# Patient Record
Sex: MALE | Race: WHITE | URBAN - METROPOLITAN AREA
[De-identification: names, ages, dates, MRNs, and addresses within clinical notes are randomized per-mention and may not be internally consistent; named-entity substitution may affect disease eponyms.]

---

## 2017-02-20 PROCEDURE — 36415 COLL VENOUS BLD VENIPUNCTURE: CPT | Performed by: INTERNAL MEDICINE

## 2017-02-20 PROCEDURE — 80061 LIPID PANEL: CPT | Performed by: INTERNAL MEDICINE

## 2017-02-20 PROCEDURE — 80076 HEPATIC FUNCTION PANEL: CPT | Performed by: INTERNAL MEDICINE

## 2018-04-22 ENCOUNTER — APPOINTMENT (OUTPATIENT)
Dept: GENERAL RADIOLOGY | Facility: HOSPITAL | Age: 68
End: 2018-04-22
Attending: EMERGENCY MEDICINE
Payer: MEDICARE

## 2018-04-22 ENCOUNTER — HOSPITAL ENCOUNTER (OUTPATIENT)
Age: 68
Discharge: ANOTHER HOSPITAL | End: 2018-04-22
Attending: EMERGENCY MEDICINE
Payer: MEDICARE

## 2018-04-22 ENCOUNTER — HOSPITAL ENCOUNTER (OUTPATIENT)
Facility: HOSPITAL | Age: 68
Setting detail: OBSERVATION
Discharge: HOME | End: 2018-04-24
Attending: EMERGENCY MEDICINE | Admitting: HOSPITALIST
Payer: MEDICARE

## 2018-04-22 ENCOUNTER — APPOINTMENT (OUTPATIENT)
Dept: CT IMAGING | Facility: HOSPITAL | Age: 68
End: 2018-04-22
Attending: EMERGENCY MEDICINE
Payer: MEDICARE

## 2018-04-22 VITALS
OXYGEN SATURATION: 98 % | DIASTOLIC BLOOD PRESSURE: 88 MMHG | HEART RATE: 96 BPM | SYSTOLIC BLOOD PRESSURE: 145 MMHG | RESPIRATION RATE: 20 BRPM

## 2018-04-22 DIAGNOSIS — R07.89 CHEST PAIN, ATYPICAL: Primary | ICD-10-CM

## 2018-04-22 DIAGNOSIS — R55 SYNCOPE, NEAR: Primary | ICD-10-CM

## 2018-04-22 DIAGNOSIS — I48.0 PAROXYSMAL ATRIAL FIBRILLATION (HCC): ICD-10-CM

## 2018-04-22 DIAGNOSIS — R07.9 ACUTE CHEST PAIN: ICD-10-CM

## 2018-04-22 PROCEDURE — 93010 ELECTROCARDIOGRAM REPORT: CPT | Performed by: EMERGENCY MEDICINE

## 2018-04-22 PROCEDURE — 99214 OFFICE O/P EST MOD 30 MIN: CPT

## 2018-04-22 PROCEDURE — 71260 CT THORAX DX C+: CPT | Performed by: EMERGENCY MEDICINE

## 2018-04-22 PROCEDURE — 71046 X-RAY EXAM CHEST 2 VIEWS: CPT | Performed by: EMERGENCY MEDICINE

## 2018-04-22 PROCEDURE — 99220 INITIAL OBSERVATION CARE,LEVL III: CPT | Performed by: HOSPITALIST

## 2018-04-22 PROCEDURE — 93005 ELECTROCARDIOGRAM TRACING: CPT

## 2018-04-22 RX ORDER — ASPIRIN 81 MG/1
324 TABLET, CHEWABLE ORAL ONCE
Status: COMPLETED | OUTPATIENT
Start: 2018-04-22 | End: 2018-04-22

## 2018-04-22 RX ORDER — WARFARIN SODIUM 5 MG/1
5 TABLET ORAL DAILY
Status: DISCONTINUED | OUTPATIENT
Start: 2018-04-23 | End: 2018-04-23

## 2018-04-22 RX ORDER — ACETAMINOPHEN 325 MG/1
650 TABLET ORAL EVERY 6 HOURS PRN
Status: DISCONTINUED | OUTPATIENT
Start: 2018-04-22 | End: 2018-04-24

## 2018-04-22 RX ORDER — ONDANSETRON 2 MG/ML
4 INJECTION INTRAMUSCULAR; INTRAVENOUS EVERY 6 HOURS PRN
Status: DISCONTINUED | OUTPATIENT
Start: 2018-04-22 | End: 2018-04-24

## 2018-04-22 RX ORDER — ATORVASTATIN CALCIUM 20 MG/1
20 TABLET, FILM COATED ORAL DAILY
Status: DISCONTINUED | OUTPATIENT
Start: 2018-04-23 | End: 2018-04-24

## 2018-04-22 RX ORDER — PANTOPRAZOLE SODIUM 40 MG/1
40 TABLET, DELAYED RELEASE ORAL
Status: DISCONTINUED | OUTPATIENT
Start: 2018-04-23 | End: 2018-04-24

## 2018-04-22 RX ORDER — ENOXAPARIN SODIUM 100 MG/ML
1 INJECTION SUBCUTANEOUS EVERY 12 HOURS SCHEDULED
Status: DISCONTINUED | OUTPATIENT
Start: 2018-04-23 | End: 2018-04-24

## 2018-04-22 RX ORDER — ASPIRIN 81 MG/1
81 TABLET ORAL DAILY
Status: DISCONTINUED | OUTPATIENT
Start: 2018-04-23 | End: 2018-04-24

## 2018-04-22 NOTE — ED NOTES
Patient states he has not been taking his medications for several months, but today he decided to take his BP medications.

## 2018-04-22 NOTE — ED INITIAL ASSESSMENT (HPI)
Patient presents to triage with complaints sudden onset of dizziness associated with mild nausea approx 5 hours pta. Patient denies visual changes/blurred vision of chest pain. States initially had a mild HA as well which has now resolved.

## 2018-04-22 NOTE — ED PROVIDER NOTES
Patient Seen in: [facility] CLIN[facility] Immediate Care In [place]    History   Patient presents with:  Dizziness (neurologic)    Stated Complaint: light headead    HPI    70 yo male was in his car earlier when he became lightheaded and felt like he might pas air)    Current:/88   Pulse 96   Resp 20   SpO2 98%         Physical Exam   Constitutional: He is oriented to person, place, and time. He appears well-developed and well-nourished. No distress. HENT:   Head: Normocephalic and atraumatic.    Mouth/Th

## 2018-04-22 NOTE — ED INITIAL ASSESSMENT (HPI)
Felt lightheaded earlier today. Not eating and drinking as usual r/t being too busy. Denies c/p.   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

## 2018-04-23 ENCOUNTER — APPOINTMENT (OUTPATIENT)
Dept: ULTRASOUND IMAGING | Facility: HOSPITAL | Age: 68
End: 2018-04-23
Attending: HOSPITALIST
Payer: MEDICARE

## 2018-04-23 ENCOUNTER — APPOINTMENT (OUTPATIENT)
Dept: CV DIAGNOSTICS | Facility: HOSPITAL | Age: 68
End: 2018-04-23
Attending: HOSPITALIST
Payer: MEDICARE

## 2018-04-23 PROCEDURE — 93880 EXTRACRANIAL BILAT STUDY: CPT | Performed by: HOSPITALIST

## 2018-04-23 PROCEDURE — 99226 SUBSEQUENT OBSERVATION CARE: CPT | Performed by: HOSPITALIST

## 2018-04-23 PROCEDURE — 93017 CV STRESS TEST TRACING ONLY: CPT | Performed by: HOSPITALIST

## 2018-04-23 PROCEDURE — 96372 THER/PROPH/DIAG INJ SC/IM: CPT

## 2018-04-23 PROCEDURE — 93350 STRESS TTE ONLY: CPT | Performed by: HOSPITALIST

## 2018-04-23 RX ORDER — ADENOSINE 3 MG/ML
INJECTION, SOLUTION INTRAVENOUS
Status: DISPENSED
Start: 2018-04-23 | End: 2018-04-24

## 2018-04-23 RX ORDER — WARFARIN SODIUM 5 MG/1
5 TABLET ORAL NIGHTLY
Status: DISCONTINUED | OUTPATIENT
Start: 2018-04-23 | End: 2018-04-24

## 2018-04-23 NOTE — ED PROVIDER NOTES
Patient Seen in: [facility] Emergency Department    History   Patient presents with:  Dizziness (neurologic): sudden onset associated mild nausea    Stated Complaint: lightheadedness left chest pain    HPI    68-year-old male with history of atria Years: 15.00        Types: Cigarettes     Quit date: 4/14/1980  Alcohol use: No               Comment: None since 1980      Review of Systems   Constitutional: Negative for appetite change, fatigue and fever.    HENT: Negative for congestion, ear pain and s Abdominal: Soft. He exhibits no distension. There is no tenderness. There is no guarding. Musculoskeletal: Normal range of motion. He exhibits no tenderness. Neurological: He is alert and oriented to person, place, and time.    5/5 strength in b/l UEs - 99 mg/dL   Sodium 133 (L) 136 - 144 mmol/L   Potassium 4.2 3.3 - 5.1 mmol/L   Chloride 103 95 - 110 mmol/L   CO2 21 (L) 22 - 32 mmol/L   BUN 14 8 - 20 mg/dL   Creatinine 0.80 0.50 - 1.50 mg/dL   Calcium, Total 9.1 8.5 - 10.5 mg/dL   BUN/CREA Ratio 17.5 1 (PULMONARY ANGIOGRAM)      IMPRESSION:    Adequate technical quality study. No evidence for pulmonary embolism. No thoracic aortic dissection. Status post mitral valve replacement. Left atrial enlargement. No pericardial effusion.     Trace right ple MEDICAL DECISION MAKING:  After obtaining the patient's history, performing the physical exam and reviewing the diagnostics, multiple initial diagnoses were considered based on the presenting problem including ACS, anxiety, chest pain, chest wall pain .

## 2018-04-23 NOTE — CONSULTS
[facility]    PATIENT'S NAME: Trey Dobbs   ATTENDING PHYSICIAN: Daja Gipson MD   CONSULTING PHYSICIAN: Nilo Lopez MD   PATIENT ACCOUNT#:   [id number]    LOCATION:  5W [address] [facility] #:   [id number]       DATE OF hematuria. There is no known history of stroke. There are no new allergies. No history of diabetes. There is no easy bruising. There is no limiting arthritis. PHYSICAL EXAMINATION:    GENERAL:  He is a male, currently appears comfortable.   VITAL aspirin 81 mg daily. 6. EKGs were reviewed. 7.   We will discuss with Dr. Rocío Osuna.    Dictated By [id number] [place] [address].  Corrie Weaver MD  d: 04/23/2018 13:49:56  t: 04/23/2018 14:12:20  Job [id number]  JLW/

## 2018-04-23 NOTE — IMAGING NOTE
16:10pm - Patient here for inpatient stress echo. Per echo tech, resting images demonstrate wall motion abnormality and decreased EF. Asymptomatic. I called Dr. Siomara Medrano called and notified of above. Orders received to proceed with test as ordered.   Laura Major

## 2018-04-23 NOTE — H&P
[facility] Patient Status:  Emergency    1950 MRN [id number]   Location [address] Attending Jericho Perez MD   [facility] Day # 0 PCP Jimy Villatoro MD     Date:   [Other] Paxton Peraza Mother 64      age 64   • Dementia Father    • Prostate Cancer Father    • Diabetes Father    • Breast Cancer Mother    • Hypertension Brother       reports that he quit smoking about 38 years ago. His smoking use included Cigarettes.  H problem:  None. Eye:  Pupils are equal, round and reactive to light, extraocular movements are intact, Normal conjunctiva. HENT:  Normocephalic, oral mucosa is moist.  Head:  Normocephalic, atraumatic.   Neck:  Supple, non-tender, no carotid bruit, no jug PM

## 2018-04-24 VITALS
HEIGHT: 71 IN | HEART RATE: 80 BPM | OXYGEN SATURATION: 97 % | RESPIRATION RATE: 20 BRPM | BODY MASS INDEX: 23.2 KG/M2 | SYSTOLIC BLOOD PRESSURE: 128 MMHG | DIASTOLIC BLOOD PRESSURE: 96 MMHG | TEMPERATURE: 98 F | WEIGHT: 165.69 LBS

## 2018-04-24 PROCEDURE — 96372 THER/PROPH/DIAG INJ SC/IM: CPT

## 2018-04-24 PROCEDURE — 99217 OBSERVATION CARE DISCHARGE: CPT | Performed by: HOSPITALIST

## 2018-04-24 RX ORDER — WARFARIN SODIUM 5 MG/1
5 TABLET ORAL NIGHTLY
Qty: 30 TABLET | Refills: 0 | Status: SHIPPED | OUTPATIENT
Start: 2018-04-24 | End: 2018-05-04

## 2018-04-24 NOTE — PROGRESS NOTES
Assessment and Plan:     1. CAD  - previous CABG  - normal stress Echo  2. SVT  - at end of stress Echo  3.  PAF  - warfarin      PLAN:  - home  - follow-up with Dr. Thompson Adjutant and will arrange for Event monitor      Subjective:     No c/o    Objective: No significant ECA stenosis. Antegrade vertebral flow bilaterally.  3. Measurements based on NASCET criteria     Dictated by (CST): Jerrica Cary MD on 4/23/2018 at 17:32     Approved by (CST): Jerrica Cary MD on 4/23/2018 at 17:37          Ct Chest

## 2018-04-25 ENCOUNTER — PATIENT OUTREACH (OUTPATIENT)
Dept: CASE MANAGEMENT | Age: 68
End: 2018-04-25

## 2018-04-25 ENCOUNTER — TELEPHONE (OUTPATIENT)
Dept: INTERNAL MEDICINE UNIT | Facility: HOSPITAL | Age: 68
End: 2018-04-25

## 2018-04-25 DIAGNOSIS — I48.20 CHRONIC ATRIAL FIBRILLATION (HCC): Primary | ICD-10-CM

## 2018-04-25 NOTE — TELEPHONE ENCOUNTER
HOSPITALIST NURSE      TELEPHONE NOTE    Pt called to review Coumadin instructions and assist w scheduling f/u apt w the [address] [place]. Per dc instructions pt to f/u on Friday 4/27/18.      No answer VM: requesting call back to RN or Coumadin c

## 2018-04-25 NOTE — PROGRESS NOTES
TCM OUTREACH    Call made to attempt to reach patient for TCM follow up. No answer, Voicemail left requesting call back at [phone].     Book by date: 5/9/18    (Triage Team if pt returns call please transfer to ext [phone])

## 2018-04-26 NOTE — PROGRESS NOTES
Initial Post Discharge Follow Up   Discharge Date: 4/24/18  Contact Date: 4/25/2018    Consent Verification:  Assessment Completed With: Patient  HIPAA Verified?   Yes    Discharge Dx:   Atypical chest pain    General:   • How have you been since your dis normal daily activities of living as you have prior to your hospital stay? yes  • Were you given a specific diet to follow at discharge?   no, but I am eating more fruits and vegetables.       Medications:     Current Outpatient Prescriptions:  Warfarin Sod see Dr Lurdes Gregory first then call to schedule    Have you made all of your follow up appointments? no - Transferred to cardiology office to schedule monitor placement   Is there any reason as to why you cannot make your appointments?    No  NCM Reviewed upco

## 2018-04-27 ENCOUNTER — ANTI-COAG VISIT (OUTPATIENT)
Dept: INTERNAL MEDICINE CLINIC | Facility: CLINIC | Age: 68
End: 2018-04-27

## 2018-04-27 DIAGNOSIS — I25.119 CORONARY ARTERY DISEASE INVOLVING NATIVE CORONARY ARTERY OF NATIVE HEART WITH ANGINA PECTORIS (HCC): ICD-10-CM

## 2018-04-27 DIAGNOSIS — I34.0 NON-RHEUMATIC MITRAL REGURGITATION: ICD-10-CM

## 2018-04-27 DIAGNOSIS — I48.91 ATRIAL FIBRILLATION, UNSPECIFIED TYPE (HCC): ICD-10-CM

## 2018-04-27 DIAGNOSIS — I48.20 CHRONIC ATRIAL FIBRILLATION (HCC): ICD-10-CM

## 2018-04-27 PROCEDURE — G0463 HOSPITAL OUTPT CLINIC VISIT: HCPCS

## 2018-04-27 PROCEDURE — 36416 COLLJ CAPILLARY BLOOD SPEC: CPT

## 2018-04-27 PROCEDURE — 85610 PROTHROMBIN TIME: CPT

## 2018-05-04 ENCOUNTER — ANTI-COAG VISIT (OUTPATIENT)
Dept: INTERNAL MEDICINE CLINIC | Facility: CLINIC | Age: 68
End: 2018-05-04

## 2018-05-04 DIAGNOSIS — I25.119 CORONARY ARTERY DISEASE INVOLVING NATIVE CORONARY ARTERY OF NATIVE HEART WITH ANGINA PECTORIS (HCC): ICD-10-CM

## 2018-05-04 DIAGNOSIS — I34.0 NON-RHEUMATIC MITRAL REGURGITATION: ICD-10-CM

## 2018-05-04 DIAGNOSIS — I48.20 CHRONIC ATRIAL FIBRILLATION (HCC): ICD-10-CM

## 2018-05-04 PROBLEM — Z79.01 LONG TERM (CURRENT) USE OF ANTICOAGULANTS: Status: ACTIVE | Noted: 2018-05-04

## 2018-05-04 PROBLEM — I47.10 SVT (SUPRAVENTRICULAR TACHYCARDIA): Status: ACTIVE | Noted: 2018-05-04

## 2018-05-04 PROBLEM — I47.1 SVT (SUPRAVENTRICULAR TACHYCARDIA) (HCC): Status: ACTIVE | Noted: 2018-05-04

## 2018-05-04 PROBLEM — I47.1 SVT (SUPRAVENTRICULAR TACHYCARDIA): Status: ACTIVE | Noted: 2018-05-04

## 2018-05-04 PROBLEM — I47.10 SVT (SUPRAVENTRICULAR TACHYCARDIA) (HCC): Status: ACTIVE | Noted: 2018-05-04

## 2018-05-04 PROCEDURE — 36416 COLLJ CAPILLARY BLOOD SPEC: CPT

## 2018-05-04 PROCEDURE — 85610 PROTHROMBIN TIME: CPT

## 2018-05-21 ENCOUNTER — ANTI-COAG VISIT (OUTPATIENT)
Dept: INTERNAL MEDICINE CLINIC | Facility: CLINIC | Age: 68
End: 2018-05-21

## 2018-05-21 DIAGNOSIS — I25.119 CORONARY ARTERY DISEASE INVOLVING NATIVE CORONARY ARTERY OF NATIVE HEART WITH ANGINA PECTORIS (HCC): ICD-10-CM

## 2018-05-21 DIAGNOSIS — I34.0 NON-RHEUMATIC MITRAL REGURGITATION: ICD-10-CM

## 2018-05-21 DIAGNOSIS — I48.20 CHRONIC ATRIAL FIBRILLATION (HCC): ICD-10-CM

## 2018-05-21 PROCEDURE — 85610 PROTHROMBIN TIME: CPT

## 2018-05-21 PROCEDURE — 36416 COLLJ CAPILLARY BLOOD SPEC: CPT

## 2018-05-24 ENCOUNTER — NURSE TRIAGE (OUTPATIENT)
Dept: INTERNAL MEDICINE CLINIC | Facility: CLINIC | Age: 68
End: 2018-05-24

## 2018-05-24 NOTE — TELEPHONE ENCOUNTER
Action Requested: Summary for Provider     []  Critical Lab, Recommendations Needed  [] Need Additional Advice  []   FYI    []   Need Orders  [] Need Medications Sent to Pharmacy  []  Other     SUMMARY: APPT CREATED, foot pain    Pt states for approx 2 wee

## 2018-05-25 ENCOUNTER — OFFICE VISIT (OUTPATIENT)
Dept: INTERNAL MEDICINE CLINIC | Facility: CLINIC | Age: 68
End: 2018-05-25

## 2018-05-25 VITALS
DIASTOLIC BLOOD PRESSURE: 64 MMHG | HEIGHT: 71 IN | SYSTOLIC BLOOD PRESSURE: 136 MMHG | BODY MASS INDEX: 23.1 KG/M2 | HEART RATE: 79 BPM | WEIGHT: 165 LBS

## 2018-05-25 DIAGNOSIS — M79.671 BILATERAL FOOT PAIN: Primary | ICD-10-CM

## 2018-05-25 DIAGNOSIS — M79.672 BILATERAL FOOT PAIN: Primary | ICD-10-CM

## 2018-05-25 PROCEDURE — G0463 HOSPITAL OUTPT CLINIC VISIT: HCPCS | Performed by: INTERNAL MEDICINE

## 2018-05-25 PROCEDURE — 99213 OFFICE O/P EST LOW 20 MIN: CPT | Performed by: INTERNAL MEDICINE

## 2018-05-25 NOTE — PATIENT INSTRUCTIONS
Please rest and apply ice to both feet 2-3 times daily, and take Tylenol when needed for pain relief. Avoid all anti-inflammatory medications because of warfarin. Schedule an appointment with Dr. Courtney Gay.

## 2018-05-25 NOTE — PROGRESS NOTES
Major Tameka is a 69year old male. Patient presents with: Foot Pain  Wrist Pain    HPI:   About 2 weeks ago, he developed pain first in his right shoulder and then his right elbow. Shoulder and elbow pain have since resolved.   More recently he has calcified nodules right                shoulder  2000: OTHER      Comment: Nasal fracture   Social History:  Smoking status: Former Smoker                                                              Packs/day: 1.50      Years: 15.00        Types: Cigarett

## 2018-05-31 ENCOUNTER — ANTI-COAG VISIT (OUTPATIENT)
Dept: INTERNAL MEDICINE CLINIC | Facility: CLINIC | Age: 68
End: 2018-05-31

## 2018-05-31 DIAGNOSIS — I48.20 CHRONIC ATRIAL FIBRILLATION (HCC): ICD-10-CM

## 2018-05-31 DIAGNOSIS — I25.119 CORONARY ARTERY DISEASE INVOLVING NATIVE CORONARY ARTERY OF NATIVE HEART WITH ANGINA PECTORIS (HCC): ICD-10-CM

## 2018-05-31 DIAGNOSIS — I34.0 NON-RHEUMATIC MITRAL REGURGITATION: ICD-10-CM

## 2018-05-31 PROCEDURE — 36416 COLLJ CAPILLARY BLOOD SPEC: CPT

## 2018-05-31 PROCEDURE — 85610 PROTHROMBIN TIME: CPT

## 2018-05-31 PROCEDURE — G0463 HOSPITAL OUTPT CLINIC VISIT: HCPCS

## 2018-06-12 NOTE — DISCHARGE SUMMARY
[facility] HOSPITALIST  DISCHARGE SUMMARY     Caitlyn Angel Patient Status:  Observation    1950 MRN [id number]   Location [facility] Attending No att. providers found   Hosp Day # 0 PCP Gregory Torres MD     Date of Admission: 2018 end of stress ECHO  -tele  -Subtherapeutic INR, might need to adjust Coumadin     Prophylaxis  Subcutaneous Lovenox     CODE STATUS  Full     Primary care physician  Yakov Cope MD     Disposition: home, follow-up with Dr. Kiara Romeo and will arrange for

## 2018-12-14 ENCOUNTER — LAB ENCOUNTER (OUTPATIENT)
Dept: LAB | Age: 68
End: 2018-12-14
Attending: INTERNAL MEDICINE
Payer: MEDICARE

## 2018-12-14 ENCOUNTER — TELEPHONE (OUTPATIENT)
Dept: INTERNAL MEDICINE CLINIC | Facility: CLINIC | Age: 68
End: 2018-12-14

## 2018-12-14 ENCOUNTER — NURSE TRIAGE (OUTPATIENT)
Dept: OTHER | Age: 68
End: 2018-12-14

## 2018-12-14 ENCOUNTER — OFFICE VISIT (OUTPATIENT)
Dept: INTERNAL MEDICINE CLINIC | Facility: CLINIC | Age: 68
End: 2018-12-14
Payer: MEDICARE

## 2018-12-14 VITALS
HEIGHT: 71 IN | TEMPERATURE: 98 F | BODY MASS INDEX: 23.8 KG/M2 | WEIGHT: 170 LBS | DIASTOLIC BLOOD PRESSURE: 75 MMHG | SYSTOLIC BLOOD PRESSURE: 136 MMHG

## 2018-12-14 DIAGNOSIS — R79.1 SUPRATHERAPEUTIC INR: ICD-10-CM

## 2018-12-14 DIAGNOSIS — I34.0 NON-RHEUMATIC MITRAL REGURGITATION: ICD-10-CM

## 2018-12-14 DIAGNOSIS — R31.9 HEMATURIA, UNDIAGNOSED CAUSE: ICD-10-CM

## 2018-12-14 DIAGNOSIS — I25.119 CORONARY ARTERY DISEASE INVOLVING NATIVE CORONARY ARTERY OF NATIVE HEART WITH ANGINA PECTORIS (HCC): ICD-10-CM

## 2018-12-14 DIAGNOSIS — I48.20 CHRONIC ATRIAL FIBRILLATION (HCC): ICD-10-CM

## 2018-12-14 DIAGNOSIS — R31.9 HEMATURIA, UNDIAGNOSED CAUSE: Primary | ICD-10-CM

## 2018-12-14 PROCEDURE — 85610 PROTHROMBIN TIME: CPT

## 2018-12-14 PROCEDURE — 36415 COLL VENOUS BLD VENIPUNCTURE: CPT

## 2018-12-14 PROCEDURE — 99212 OFFICE O/P EST SF 10 MIN: CPT | Performed by: INTERNAL MEDICINE

## 2018-12-14 PROCEDURE — G0463 HOSPITAL OUTPT CLINIC VISIT: HCPCS | Performed by: INTERNAL MEDICINE

## 2018-12-14 PROCEDURE — 81003 URINALYSIS AUTO W/O SCOPE: CPT | Performed by: INTERNAL MEDICINE

## 2018-12-14 NOTE — PATIENT INSTRUCTIONS
Blood in the Urine    Blood in the urine (hematuria) has many possible causes. If it occurs after an injury (such as a car accident or fall), it is most often a sign of bruising to the kidney or bladder.  Common causes of blood in the urine include urinar · Fever of 100.4ºF (38ºC) or higher, or as directed by your healthcare provider  · Repeated vomiting  · Bleeding from the nose or gums or easy bruising  Date Last Reviewed: 9/1/2016  © 4967-5814 [employer].  [address] [place], [place]

## 2018-12-14 NOTE — TELEPHONE ENCOUNTER
Action Requested: Summary for Provider     []  Critical Lab, Recommendations Needed  [] Need Additional Advice  []   FYI    []   Need Orders  [] Need Medications Sent to Pharmacy  []  Other     SUMMARY: Appointment made with Dr Rebecca Lundborg today 12/14/18 at 3:00

## 2018-12-14 NOTE — PROGRESS NOTES
HPI:    Patient ID: Víctor Ospina is a 77year old male.   Hematuria (Pt just recovered from flu, noticed some blood in urine few days ago, today noticed alot of blood in urine, little back pain right side, frequency)      HPI   70y old gentleman with A reports that he quit smoking about 38 years ago. His smoking use included cigarettes. He has a 22.50 pack-year smoking history. he has never used smokeless tobacco. He reports that he does not drink alcohol or use drugs.       Current Outpatient Medication Gastrointestinal: Positive for constipation and blood in stool (minor, 1x). Genitourinary: Positive for urgency, frequency and hematuria. Negative for dysuria and flank pain. Neurological: Negative for dizziness and light-headedness.    Hematological: D Return in about 2 weeks (around 12/28/2018), or if symptoms worsen or fail to improve, for Re-evaluation; see PCP if possible. After visit summary and education provided to patient. All questions answered.  Patient understands and agrees to follow dire · If your urine does not appear bloody (pink, brown or red) then you do not need to restrict your activity in any way.   · If you can see blood in your urine, rest and avoid heavy exertion until your next exam. Do not use aspirin, blood thinners, or anti-pl

## 2018-12-15 NOTE — PROGRESS NOTES
Lab noted. INR 6.0 with non-life threatening bleeding on clinical exam (and INR not >10), no need for oral vitamin K antagonist at this time. Spoke with patient directly via phone. Told him to not take coumadin Friday(today), Saturday, Sunday.  He currently

## 2018-12-15 NOTE — TELEPHONE ENCOUNTER
Please call pt Saturday morning. INR 6.0. Pt held Coumadin Friday. Will need to hold Coumadin again tomorrow and Sunday. Recheck INR Monday and contact Coumadin clinic for instructions.

## 2018-12-15 NOTE — PROGRESS NOTES
Lab noted. INR 6.0 with non-life threatening bleeding on clinical exam, no need for oral vitamin K antagonist as INR not >10.   Will directly call and ask him not to take coumadin Friday(today), Saturday, Sunday and will have him recheck on Monday AM. Pt ne

## 2018-12-17 ENCOUNTER — ANTI-COAG VISIT (OUTPATIENT)
Dept: INTERNAL MEDICINE CLINIC | Facility: CLINIC | Age: 68
End: 2018-12-17
Payer: MEDICARE

## 2018-12-17 DIAGNOSIS — Z51.81 ENCOUNTER FOR THERAPEUTIC DRUG MONITORING: ICD-10-CM

## 2018-12-17 DIAGNOSIS — I48.20 CHRONIC ATRIAL FIBRILLATION (HCC): ICD-10-CM

## 2018-12-17 DIAGNOSIS — I25.119 CORONARY ARTERY DISEASE INVOLVING NATIVE CORONARY ARTERY OF NATIVE HEART WITH ANGINA PECTORIS (HCC): ICD-10-CM

## 2018-12-17 DIAGNOSIS — I34.0 NON-RHEUMATIC MITRAL REGURGITATION: ICD-10-CM

## 2018-12-17 DIAGNOSIS — Z79.01 LONG TERM (CURRENT) USE OF ANTICOAGULANTS: Primary | ICD-10-CM

## 2018-12-17 PROCEDURE — 36416 COLLJ CAPILLARY BLOOD SPEC: CPT

## 2018-12-17 PROCEDURE — 85610 PROTHROMBIN TIME: CPT

## 2018-12-28 ENCOUNTER — ANTI-COAG VISIT (OUTPATIENT)
Dept: INTERNAL MEDICINE CLINIC | Facility: CLINIC | Age: 68
End: 2018-12-28
Payer: MEDICARE

## 2018-12-28 DIAGNOSIS — I25.119 CORONARY ARTERY DISEASE INVOLVING NATIVE CORONARY ARTERY OF NATIVE HEART WITH ANGINA PECTORIS (HCC): ICD-10-CM

## 2018-12-28 DIAGNOSIS — I34.0 NON-RHEUMATIC MITRAL REGURGITATION: ICD-10-CM

## 2018-12-28 DIAGNOSIS — Z51.81 ENCOUNTER FOR THERAPEUTIC DRUG MONITORING: ICD-10-CM

## 2018-12-28 DIAGNOSIS — Z79.01 LONG TERM (CURRENT) USE OF ANTICOAGULANTS: ICD-10-CM

## 2018-12-28 DIAGNOSIS — I48.20 CHRONIC ATRIAL FIBRILLATION (HCC): ICD-10-CM

## 2018-12-28 PROCEDURE — 36416 COLLJ CAPILLARY BLOOD SPEC: CPT

## 2018-12-28 PROCEDURE — 85610 PROTHROMBIN TIME: CPT

## 2019-05-22 ENCOUNTER — TELEPHONE (OUTPATIENT)
Dept: INTERNAL MEDICINE CLINIC | Facility: CLINIC | Age: 69
End: 2019-05-22

## 2019-05-24 ENCOUNTER — ANTI-COAG VISIT (OUTPATIENT)
Dept: INTERNAL MEDICINE CLINIC | Facility: CLINIC | Age: 69
End: 2019-05-24
Payer: MEDICARE

## 2019-05-24 DIAGNOSIS — Z51.81 ENCOUNTER FOR THERAPEUTIC DRUG MONITORING: ICD-10-CM

## 2019-05-24 DIAGNOSIS — I25.119 CORONARY ARTERY DISEASE INVOLVING NATIVE CORONARY ARTERY OF NATIVE HEART WITH ANGINA PECTORIS (HCC): ICD-10-CM

## 2019-05-24 DIAGNOSIS — I48.20 CHRONIC ATRIAL FIBRILLATION (HCC): ICD-10-CM

## 2019-05-24 DIAGNOSIS — Z79.01 LONG TERM (CURRENT) USE OF ANTICOAGULANTS: ICD-10-CM

## 2019-05-24 DIAGNOSIS — I34.0 NON-RHEUMATIC MITRAL REGURGITATION: ICD-10-CM

## 2019-05-24 PROBLEM — Z95.1 S/P CABG (CORONARY ARTERY BYPASS GRAFT): Status: ACTIVE | Noted: 2019-05-24

## 2019-05-24 PROCEDURE — 36416 COLLJ CAPILLARY BLOOD SPEC: CPT

## 2019-05-24 PROCEDURE — 85610 PROTHROMBIN TIME: CPT

## 2019-06-19 ENCOUNTER — HOSPITAL ENCOUNTER (OUTPATIENT)
Age: 69
Discharge: HOME | End: 2019-06-19
Attending: EMERGENCY MEDICINE
Payer: MEDICARE

## 2019-06-19 VITALS
DIASTOLIC BLOOD PRESSURE: 77 MMHG | OXYGEN SATURATION: 96 % | HEART RATE: 99 BPM | RESPIRATION RATE: 18 BRPM | SYSTOLIC BLOOD PRESSURE: 151 MMHG | TEMPERATURE: 98 F | WEIGHT: 175 LBS | BODY MASS INDEX: 24.5 KG/M2 | HEIGHT: 71 IN

## 2019-06-19 DIAGNOSIS — Z79.01 WARFARIN ANTICOAGULATION: ICD-10-CM

## 2019-06-19 DIAGNOSIS — R31.9 HEMATURIA, UNSPECIFIED TYPE: Primary | ICD-10-CM

## 2019-06-19 PROCEDURE — 36415 COLL VENOUS BLD VENIPUNCTURE: CPT

## 2019-06-19 PROCEDURE — 85610 PROTHROMBIN TIME: CPT | Performed by: EMERGENCY MEDICINE

## 2019-06-19 PROCEDURE — 81002 URINALYSIS NONAUTO W/O SCOPE: CPT

## 2019-06-19 PROCEDURE — 99213 OFFICE O/P EST LOW 20 MIN: CPT

## 2019-06-19 NOTE — ED PROVIDER NOTES
Patient Seen in: [facility] Immediate Care In [place] [facility]    History   Patient presents with:  Urinary Symptoms (urologic)    Stated Complaint: BLOOD IN URINE    HPI    78 yo male on coumadin after valve replacement.  Today he noticed blood in his uri Resp 18   Temp 98 °F (36.7 °C)   Temp src Oral   SpO2 96 %   O2 Device None (Room air)       Current:/77   Pulse 99   Temp 98 °F (36.7 °C) (Oral)   Resp 18   Ht 180.3 cm (5' 11\")   Wt 79.4 kg   SpO2 96%   BMI 24.41 kg/m²         Physical Exam   Co List as of 6/19/2019  4:46 PM

## 2019-10-29 ENCOUNTER — TELEPHONE (OUTPATIENT)
Dept: INTERNAL MEDICINE CLINIC | Facility: CLINIC | Age: 69
End: 2019-10-29

## 2019-10-29 DIAGNOSIS — Z79.01 LONG TERM (CURRENT) USE OF ANTICOAGULANTS: ICD-10-CM

## 2019-10-29 DIAGNOSIS — I48.21 PERMANENT ATRIAL FIBRILLATION (HCC): Primary | ICD-10-CM

## 2019-10-29 DIAGNOSIS — Z51.81 ENCOUNTER FOR THERAPEUTIC DRUG MONITORING: ICD-10-CM

## 2019-10-31 NOTE — TELEPHONE ENCOUNTER
Patient is returning the call. Pt would like to schedule an appointment for tomorrow around 1:00 pm or Monday at 1:00 pm. Pt can be reached at [phone].

## 2019-11-01 PROBLEM — I48.21 PERMANENT ATRIAL FIBRILLATION (HCC): Status: ACTIVE | Noted: 2019-11-01

## 2019-11-01 PROBLEM — Z51.81 ENCOUNTER FOR THERAPEUTIC DRUG MONITORING: Status: ACTIVE | Noted: 2019-11-01

## 2019-11-02 ENCOUNTER — TELEPHONE (OUTPATIENT)
Dept: INTERNAL MEDICINE CLINIC | Facility: CLINIC | Age: 69
End: 2019-11-02

## 2019-11-02 NOTE — TELEPHONE ENCOUNTER
PATIENT WOULD LIKE A NURSE TO GIVE HIM A CALL BACK ABOUT A APPOINTMENT HE HAD SCHEDULE ON Friday THAT HE DIDN'T APPROVE OF

## 2019-11-04 NOTE — TELEPHONE ENCOUNTER
LMTCB if he needs to reschedule today's appointment at 1pm. Noted in message earlier conversation from 10/31 about scheduling for either Friday or Monday. Confirmed today's 1pm appointment.

## 2019-11-21 ENCOUNTER — TELEPHONE (OUTPATIENT)
Dept: INTERNAL MEDICINE CLINIC | Facility: CLINIC | Age: 69
End: 2019-11-21

## 2019-11-21 NOTE — TELEPHONE ENCOUNTER
Per Carlyle Wen she is returning Tammy's call. There is no communication. Please advise. She states the answer is \" No\".

## 2019-11-26 ENCOUNTER — TELEPHONE (OUTPATIENT)
Dept: INTERNAL MEDICINE CLINIC | Facility: CLINIC | Age: 69
End: 2019-11-26

## 2019-11-26 NOTE — TELEPHONE ENCOUNTER
S/w  Karla Ramirez, states he had a hard time getting thru to us and is traveling for Thanksgiving. Scheduled to come in 12/4.

## 2019-12-04 ENCOUNTER — ANTI-COAG VISIT (OUTPATIENT)
Dept: INTERNAL MEDICINE CLINIC | Facility: CLINIC | Age: 69
End: 2019-12-04
Payer: MEDICARE

## 2019-12-04 DIAGNOSIS — I25.10 CAD IN NATIVE ARTERY: ICD-10-CM

## 2019-12-04 DIAGNOSIS — I48.21 PERMANENT ATRIAL FIBRILLATION (HCC): ICD-10-CM

## 2019-12-04 DIAGNOSIS — I48.20 CHRONIC ATRIAL FIBRILLATION (HCC): ICD-10-CM

## 2019-12-04 DIAGNOSIS — Z51.81 ENCOUNTER FOR THERAPEUTIC DRUG MONITORING: ICD-10-CM

## 2019-12-04 DIAGNOSIS — I34.0 NON-RHEUMATIC MITRAL REGURGITATION: ICD-10-CM

## 2019-12-04 DIAGNOSIS — Z79.01 LONG TERM (CURRENT) USE OF ANTICOAGULANTS: ICD-10-CM

## 2019-12-04 PROCEDURE — 85610 PROTHROMBIN TIME: CPT

## 2019-12-04 PROCEDURE — 36416 COLLJ CAPILLARY BLOOD SPEC: CPT

## 2019-12-10 ENCOUNTER — ANTI-COAG VISIT (OUTPATIENT)
Dept: INTERNAL MEDICINE CLINIC | Facility: CLINIC | Age: 69
End: 2019-12-10
Payer: MEDICARE

## 2019-12-10 DIAGNOSIS — I25.10 CAD IN NATIVE ARTERY: ICD-10-CM

## 2019-12-10 DIAGNOSIS — Z51.81 ENCOUNTER FOR THERAPEUTIC DRUG MONITORING: ICD-10-CM

## 2019-12-10 DIAGNOSIS — I48.20 CHRONIC ATRIAL FIBRILLATION (HCC): ICD-10-CM

## 2019-12-10 DIAGNOSIS — I34.0 NON-RHEUMATIC MITRAL REGURGITATION: ICD-10-CM

## 2019-12-10 DIAGNOSIS — Z79.01 LONG TERM (CURRENT) USE OF ANTICOAGULANTS: ICD-10-CM

## 2019-12-10 DIAGNOSIS — I48.21 PERMANENT ATRIAL FIBRILLATION (HCC): ICD-10-CM

## 2019-12-10 PROCEDURE — 85610 PROTHROMBIN TIME: CPT

## 2019-12-10 PROCEDURE — 36416 COLLJ CAPILLARY BLOOD SPEC: CPT

## 2019-12-24 ENCOUNTER — APPOINTMENT (OUTPATIENT)
Dept: INTERVENTIONAL RADIOLOGY/VASCULAR | Facility: HOSPITAL | Age: 69
DRG: 271 | End: 2019-12-24
Attending: RADIOLOGY
Payer: MEDICARE

## 2019-12-24 ENCOUNTER — APPOINTMENT (OUTPATIENT)
Dept: CT IMAGING | Facility: HOSPITAL | Age: 69
DRG: 271 | End: 2019-12-24
Attending: EMERGENCY MEDICINE
Payer: MEDICARE

## 2019-12-24 ENCOUNTER — HOSPITAL ENCOUNTER (INPATIENT)
Facility: HOSPITAL | Age: 69
LOS: 7 days | Discharge: NURSING, REHAB OR LONG-TERM CARE | DRG: 271 | End: 2019-12-31
Attending: EMERGENCY MEDICINE | Admitting: HOSPITALIST
Payer: MEDICARE

## 2019-12-24 DIAGNOSIS — D64.9 ANEMIA, UNSPECIFIED TYPE: ICD-10-CM

## 2019-12-24 DIAGNOSIS — S30.1XXA ABDOMINAL WALL HEMATOMA, INITIAL ENCOUNTER: Primary | ICD-10-CM

## 2019-12-24 LAB
ANION GAP SERPL CALC-SCNC: 9 MMOL/L (ref 0–18)
ANTIBODY SCREEN: NEGATIVE
BASOPHILS # BLD AUTO: 0.02 X10(3) UL (ref 0–0.2)
BASOPHILS NFR BLD AUTO: 0.2 %
BUN BLD-MCNC: 20 MG/DL (ref 7–18)
BUN/CREAT SERPL: 19.2 (ref 10–20)
CALCIUM BLD-MCNC: 8.5 MG/DL (ref 8.5–10.1)
CHLORIDE SERPL-SCNC: 96 MMOL/L (ref 98–112)
CO2 SERPL-SCNC: 23 MMOL/L (ref 21–32)
CREAT BLD-MCNC: 1.04 MG/DL (ref 0.7–1.3)
DEPRECATED RDW RBC AUTO: 39.8 FL (ref 35.1–46.3)
EOSINOPHIL # BLD AUTO: 0.01 X10(3) UL (ref 0–0.7)
EOSINOPHIL NFR BLD AUTO: 0.1 %
ERYTHROCYTE [DISTWIDTH] IN BLOOD BY AUTOMATED COUNT: 12.2 % (ref 11–15)
GLUCOSE BLD-MCNC: 179 MG/DL (ref 70–99)
HCT VFR BLD AUTO: 34.4 % (ref 39–53)
HGB BLD-MCNC: 11.7 G/DL (ref 13–17.5)
IMM GRANULOCYTES # BLD AUTO: 0.04 X10(3) UL (ref 0–1)
IMM GRANULOCYTES NFR BLD: 0.4 %
INR BLD: 2.33 (ref 0.9–1.2)
INR BLD: 3.72 (ref 0.9–1.2)
LYMPHOCYTES # BLD AUTO: 1.04 X10(3) UL (ref 1–4)
LYMPHOCYTES NFR BLD AUTO: 11.7 %
MCH RBC QN AUTO: 30.5 PG (ref 26–34)
MCHC RBC AUTO-ENTMCNC: 34 G/DL (ref 31–37)
MCV RBC AUTO: 89.6 FL (ref 80–100)
MONOCYTES # BLD AUTO: 0.67 X10(3) UL (ref 0.1–1)
MONOCYTES NFR BLD AUTO: 7.5 %
NEUTROPHILS # BLD AUTO: 7.11 X10 (3) UL (ref 1.5–7.7)
NEUTROPHILS # BLD AUTO: 7.11 X10(3) UL (ref 1.5–7.7)
NEUTROPHILS NFR BLD AUTO: 80.1 %
OSMOLALITY SERPL CALC.SUM OF ELEC: 273 MOSM/KG (ref 275–295)
PLATELET # BLD AUTO: 223 10(3)UL (ref 150–450)
POTASSIUM SERPL-SCNC: 4.9 MMOL/L (ref 3.5–5.1)
PROTHROMBIN TIME: 25.9 SECONDS (ref 11.8–14.5)
PROTHROMBIN TIME: 37.8 SECONDS (ref 11.8–14.5)
RBC # BLD AUTO: 3.84 X10(6)UL (ref 3.8–5.8)
RH BLOOD TYPE: POSITIVE
SODIUM SERPL-SCNC: 128 MMOL/L (ref 136–145)
WBC # BLD AUTO: 8.9 X10(3) UL (ref 4–11)

## 2019-12-24 PROCEDURE — B41C1ZZ FLUOROSCOPY OF PELVIC ARTERIES USING LOW OSMOLAR CONTRAST: ICD-10-PCS | Performed by: RADIOLOGY

## 2019-12-24 PROCEDURE — 74177 CT ABD & PELVIS W/CONTRAST: CPT | Performed by: EMERGENCY MEDICINE

## 2019-12-24 PROCEDURE — 04L23DZ OCCLUSION OF GASTRIC ARTERY WITH INTRALUMINAL DEVICE, PERCUTANEOUS APPROACH: ICD-10-PCS | Performed by: RADIOLOGY

## 2019-12-24 PROCEDURE — 99223 1ST HOSP IP/OBS HIGH 75: CPT | Performed by: HOSPITALIST

## 2019-12-24 PROCEDURE — 30233K1 TRANSFUSION OF NONAUTOLOGOUS FROZEN PLASMA INTO PERIPHERAL VEIN, PERCUTANEOUS APPROACH: ICD-10-PCS | Performed by: EMERGENCY MEDICINE

## 2019-12-24 RX ORDER — MORPHINE SULFATE 2 MG/ML
2 INJECTION, SOLUTION INTRAMUSCULAR; INTRAVENOUS EVERY 2 HOUR PRN
Status: DISCONTINUED | OUTPATIENT
Start: 2019-12-24 | End: 2019-12-31

## 2019-12-24 RX ORDER — DOCUSATE SODIUM 100 MG/1
100 CAPSULE, LIQUID FILLED ORAL 2 TIMES DAILY
Status: DISCONTINUED | OUTPATIENT
Start: 2019-12-24 | End: 2019-12-31

## 2019-12-24 RX ORDER — METOCLOPRAMIDE HYDROCHLORIDE 5 MG/ML
10 INJECTION INTRAMUSCULAR; INTRAVENOUS EVERY 8 HOURS PRN
Status: DISCONTINUED | OUTPATIENT
Start: 2019-12-24 | End: 2019-12-31

## 2019-12-24 RX ORDER — SODIUM PHOSPHATE, DIBASIC AND SODIUM PHOSPHATE, MONOBASIC 7; 19 G/133ML; G/133ML
1 ENEMA RECTAL ONCE AS NEEDED
Status: DISCONTINUED | OUTPATIENT
Start: 2019-12-24 | End: 2019-12-31

## 2019-12-24 RX ORDER — HYDROCODONE BITARTRATE AND ACETAMINOPHEN 5; 325 MG/1; MG/1
2 TABLET ORAL EVERY 4 HOURS PRN
Status: DISCONTINUED | OUTPATIENT
Start: 2019-12-24 | End: 2019-12-30

## 2019-12-24 RX ORDER — ACETAMINOPHEN 325 MG/1
650 TABLET ORAL EVERY 4 HOURS PRN
Status: DISCONTINUED | OUTPATIENT
Start: 2019-12-24 | End: 2019-12-31

## 2019-12-24 RX ORDER — MORPHINE SULFATE 4 MG/ML
4 INJECTION, SOLUTION INTRAMUSCULAR; INTRAVENOUS ONCE
Status: COMPLETED | OUTPATIENT
Start: 2019-12-24 | End: 2019-12-24

## 2019-12-24 RX ORDER — MORPHINE SULFATE 4 MG/ML
4 INJECTION, SOLUTION INTRAMUSCULAR; INTRAVENOUS EVERY 2 HOUR PRN
Status: DISCONTINUED | OUTPATIENT
Start: 2019-12-24 | End: 2019-12-31

## 2019-12-24 RX ORDER — ONDANSETRON 2 MG/ML
4 INJECTION INTRAMUSCULAR; INTRAVENOUS EVERY 6 HOURS PRN
Status: DISCONTINUED | OUTPATIENT
Start: 2019-12-24 | End: 2019-12-31

## 2019-12-24 RX ORDER — ACETAMINOPHEN 325 MG/1
650 TABLET ORAL EVERY 6 HOURS PRN
Status: DISCONTINUED | OUTPATIENT
Start: 2019-12-24 | End: 2019-12-31

## 2019-12-24 RX ORDER — SODIUM CHLORIDE 0.9 % (FLUSH) 0.9 %
3 SYRINGE (ML) INJECTION AS NEEDED
Status: DISCONTINUED | OUTPATIENT
Start: 2019-12-24 | End: 2019-12-31

## 2019-12-24 RX ORDER — MORPHINE SULFATE 4 MG/ML
INJECTION, SOLUTION INTRAMUSCULAR; INTRAVENOUS
Status: COMPLETED
Start: 2019-12-24 | End: 2019-12-24

## 2019-12-24 RX ORDER — BISACODYL 10 MG
10 SUPPOSITORY, RECTAL RECTAL
Status: DISCONTINUED | OUTPATIENT
Start: 2019-12-24 | End: 2019-12-31

## 2019-12-24 RX ORDER — MORPHINE SULFATE 4 MG/ML
6 INJECTION, SOLUTION INTRAMUSCULAR; INTRAVENOUS EVERY 2 HOUR PRN
Status: DISCONTINUED | OUTPATIENT
Start: 2019-12-24 | End: 2019-12-31

## 2019-12-24 RX ORDER — HYDROCODONE BITARTRATE AND ACETAMINOPHEN 5; 325 MG/1; MG/1
1 TABLET ORAL EVERY 4 HOURS PRN
Status: DISCONTINUED | OUTPATIENT
Start: 2019-12-24 | End: 2019-12-30

## 2019-12-24 RX ORDER — MIDAZOLAM HYDROCHLORIDE 1 MG/ML
INJECTION INTRAMUSCULAR; INTRAVENOUS
Status: COMPLETED
Start: 2019-12-24 | End: 2019-12-24

## 2019-12-24 RX ORDER — POLYETHYLENE GLYCOL 3350 17 G/17G
17 POWDER, FOR SOLUTION ORAL DAILY PRN
Status: DISCONTINUED | OUTPATIENT
Start: 2019-12-24 | End: 2019-12-31

## 2019-12-24 RX ORDER — ATORVASTATIN CALCIUM 20 MG/1
20 TABLET, FILM COATED ORAL NIGHTLY
Status: DISCONTINUED | OUTPATIENT
Start: 2019-12-24 | End: 2019-12-31

## 2019-12-24 NOTE — H&P
[facility] Patient Status:  Inpatient    1950 MRN [id number]   Location [facility] 2W/SW Attending Isaac Olivas MD   Hosp Day # 0 PCP Aguila Mishra MD     Date:  2019  Date graft to LAD and mitral valve repair with ligation left atrial appendage   • HEMORRHOIDECTOMY  1989   • OTHER  1979    Excision benign calcified nodules right shoulder   • OTHER  2000    Nasal fracture     Family History   Problem Relation Age of Onset   • Soft + tenderness,+ extensive bruising over ab. wall. Bowel sounds were present. MUSCULOSKELETAL:  There was no deformity. There was full range of motion in all the extremities.    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   He was taken to IR and had embolization and occlusion of this bleeding vessel  Plan monitoring in PCU  Hold Coumadin repeat INR in the morning  INR initially was 3.7 2 repeat INR 2.3  Discussed with

## 2019-12-24 NOTE — PROGRESS NOTES
ASSESSMENT/PLAN:     Impression: 1. Hematoma of his abdominal wall in a 78-year-old male on warfarin who is been coughing. 2.  History of mitral valve repair. 3.  History of car disease post bypass. 4.  History of atrial fibrillation.     Recommendat Atrial fibrillation [facility]) November 2015   • Dyslipidemia    • Long term (current) use of anticoagulants 5/4/2018   • Mitral regurgitation     Severe, s/p mitral valve repair 11-15; Echo 3-16 with normal LV function, EF 50%, mild LAE, mild AI, trace MR, mil oriented. Normal affect. CV: PALP:PMI not displaced; no lifts, thrills or rubs. AUSC: Irregular rhythm, normal S1, S2 without S3; soft systolic murmur. CAROTIDS:carotid pulses normal. ABD AORTA:not enlarged. FEM:femoral pulses intact.  PEDAL:pedal pulses

## 2019-12-24 NOTE — ED PROVIDER NOTES
Patient Seen in: [facility] AND [facility] Emergency Department      History   Patient presents with:  Testicular Swelling    Stated Complaint: testicular problem    HPI    78-year-old male on Coumadin with history of atrial fibrillation as well as valve repair above.    Physical Exam     ED Triage Vitals [12/24/19 0817]   BP (!) 159/94   Pulse 100   Resp 20   Temp 98 °F (36.7 °C)   Temp src Oral   SpO2 96 %   O2 Device None (Room air)       Current:BP (!) 140/97 (BP Location: Right arm)   Pulse 95   Temp 97.6 °F (*)     All other components within normal limits   CBC W/ DIFFERENTIAL - Abnormal; Notable for the following components:    HGB 11.7 (*)     HCT 34.4 (*)     All other components within normal limits   CBC WITH DIFFERENTIAL WITH PLATELET    Narrative: [employer]) NRDR ([address]), which includes the Dose Index Registry. No oral contrast was ingested. FINDINGS: LUNG BASES: The heart is not enlarged. A prosthetic mitral valve and sternotomy changes are partially imaged. (series 2, image 108) that are most consistent with areas of active hemorrhage.   A similar focus of contrast enhancement is noted in the smaller, more superior component of the right rectus muscle cranial to the level of the  umbilicus (series 2, image 58) who is comfortable with FFP for reversal of his Coumadin which she does for atrial fibrillation. I spoke to Dr. George Wolf as a consultant.   The patient will be admitted to the [facility] hospitalist.  I spoke to Dr. Love Castillo who agreed with the FFP anticoagulant r

## 2019-12-24 NOTE — ED NOTES
Pt to IR via cart per IR team. All belongings with patient  Pt stable upon leaving ed    Blood bank called and notified to send FFP to IR tube 220

## 2019-12-24 NOTE — PROGRESS NOTES
Hematoma noted in groin area bilateral.   Ecchymotic area of right lower abdomen extending to the scrotum area. There is also a hematoma noted in left groin. This was noted prior to arterial access.

## 2019-12-24 NOTE — PROGRESS NOTES
Procedure hand off report called to ICU R.N. Vitals signs stable procedure site Left groin remains intact without signs of hematoma or bleeding. Dr. Flores Just spoke with patient post procedure.    Transferred to room 205

## 2019-12-24 NOTE — ED INITIAL ASSESSMENT (HPI)
Pt c/o lower abd pain, dark purple/black skin discoloration to lower abd and testicles. Swelling and pain to testicles x 1 week, pt is on coumadin.

## 2019-12-24 NOTE — BRIEF PROCEDURE NOTE
[facility] HOSP - [facility]  Procedure Note    Katharina Ellison Patient Status:  Inpatient    1950 MRN [id number]   Location [facility] 2W/SW Attending Sae Alegria MD   Hosp Day # 0 PCP Jayden Chacon MD     Procedure: Pelvic arterio

## 2019-12-24 NOTE — ED NOTES
Received pt a/ox3, clear speech, nad, no resp distress, ambulatory with steady gait  Here with c/o lower abd/pelvic pain x 1 week. +black/blue ecchymosis to lower abdomen.    Black ecchymosis to testes and around penis  Pt reports he had flu last week with

## 2019-12-25 LAB
ANION GAP SERPL CALC-SCNC: 5 MMOL/L (ref 0–18)
BASOPHILS # BLD AUTO: 0.01 X10(3) UL (ref 0–0.2)
BASOPHILS NFR BLD AUTO: 0.1 %
BLOOD TYPE BARCODE: 5100
BUN BLD-MCNC: 19 MG/DL (ref 7–18)
BUN/CREAT SERPL: 26.8 (ref 10–20)
CALCIUM BLD-MCNC: 8 MG/DL (ref 8.5–10.1)
CHLORIDE SERPL-SCNC: 97 MMOL/L (ref 98–112)
CO2 SERPL-SCNC: 29 MMOL/L (ref 21–32)
CREAT BLD-MCNC: 0.71 MG/DL (ref 0.7–1.3)
DEPRECATED RDW RBC AUTO: 40.7 FL (ref 35.1–46.3)
EOSINOPHIL # BLD AUTO: 0.06 X10(3) UL (ref 0–0.7)
EOSINOPHIL NFR BLD AUTO: 0.6 %
ERYTHROCYTE [DISTWIDTH] IN BLOOD BY AUTOMATED COUNT: 12.2 % (ref 11–15)
GLUCOSE BLD-MCNC: 125 MG/DL (ref 70–99)
HAV IGM SER QL: 2.1 MG/DL (ref 1.6–2.6)
HCT VFR BLD AUTO: 27.6 % (ref 39–53)
HGB BLD-MCNC: 9.2 G/DL (ref 13–17.5)
IMM GRANULOCYTES # BLD AUTO: 0.04 X10(3) UL (ref 0–1)
IMM GRANULOCYTES NFR BLD: 0.4 %
INR BLD: 3.05 (ref 0.9–1.2)
LYMPHOCYTES # BLD AUTO: 1.33 X10(3) UL (ref 1–4)
LYMPHOCYTES NFR BLD AUTO: 14.1 %
MCH RBC QN AUTO: 30.5 PG (ref 26–34)
MCHC RBC AUTO-ENTMCNC: 33.3 G/DL (ref 31–37)
MCV RBC AUTO: 91.4 FL (ref 80–100)
MONOCYTES # BLD AUTO: 1.04 X10(3) UL (ref 0.1–1)
MONOCYTES NFR BLD AUTO: 11.1 %
NEUTROPHILS # BLD AUTO: 6.93 X10 (3) UL (ref 1.5–7.7)
NEUTROPHILS # BLD AUTO: 6.93 X10(3) UL (ref 1.5–7.7)
NEUTROPHILS NFR BLD AUTO: 73.7 %
OSMOLALITY SERPL CALC.SUM OF ELEC: 276 MOSM/KG (ref 275–295)
PLATELET # BLD AUTO: 221 10(3)UL (ref 150–450)
POTASSIUM SERPL-SCNC: 4.9 MMOL/L (ref 3.5–5.1)
PROTHROMBIN TIME: 32.2 SECONDS (ref 11.8–14.5)
RBC # BLD AUTO: 3.02 X10(6)UL (ref 3.8–5.8)
SODIUM SERPL-SCNC: 131 MMOL/L (ref 136–145)
WBC # BLD AUTO: 9.4 X10(3) UL (ref 4–11)

## 2019-12-25 PROCEDURE — 99233 SBSQ HOSP IP/OBS HIGH 50: CPT | Performed by: HOSPITALIST

## 2019-12-25 PROCEDURE — 99223 1ST HOSP IP/OBS HIGH 75: CPT | Performed by: SURGERY

## 2019-12-25 RX ORDER — HYDROCODONE POLISTIREX AND CHLORPHENIRAMINE POLISTIREX 10; 8 MG/5ML; MG/5ML
5 SUSPENSION, EXTENDED RELEASE ORAL 2 TIMES DAILY
Status: DISCONTINUED | OUTPATIENT
Start: 2019-12-25 | End: 2019-12-31

## 2019-12-25 RX ORDER — METOPROLOL SUCCINATE 25 MG/1
25 TABLET, EXTENDED RELEASE ORAL
Status: DISCONTINUED | OUTPATIENT
Start: 2019-12-26 | End: 2019-12-31

## 2019-12-25 NOTE — PROGRESS NOTES
[address] Cardiology  Progress Note    Katharina Ellison Patient Status:  Inpatient    1950 MRN [id number]   Location [facility] 2W/SW Attending Sae Alegria MD   Hosp Day # 1 PCP Jayden Chacon MD     Impression:   Impression: Or  HYDROcodone-acetaminophen (NORCO) 5-325 MG per tab 2 tablet, 2 tablet, Oral, Q4H PRN  morphINE sulfate (PF) 2 MG/ML injection 2 mg, 2 mg, Intravenous, Q2H PRN    Or  morphINE sulfate (PF) 4 MG/ML injection 4 mg, 4 mg, Intravenous, Q2H PRN    Or  morphI 12/25/2019     No results for input(s): DEBBIE VIDES in the last 168 hours.     Jose Faustin MD  12/25/2019  5:55 AM

## 2019-12-25 NOTE — CONSULTS
Stable s/p IR coil embolization of R inferior epigastric bleeding, coagulopathy and probably triggered by coughing.   Close obs, diet as able, may need bowel regimen for ileus, mobilize, expect gradual resorption of hematoma, resuming anticoagulation timing

## 2019-12-25 NOTE — PLAN OF CARE
Problem: Patient/Family Goals  Goal: Patient/Family Long Term Goal  Description  Patient's Long Term Goal: Go home    Interventions:  - Adjust cardiac medications as needed  - Up as tolerated, increasing activity  - See additional Care Plan goals for spe response  - Consider cultural and social influences on pain and pain management  - Manage/alleviate anxiety  - Utilize distraction and/or relaxation techniques  - Monitor for opioid side effects  - Notify MD/LIP if interventions unsuccessful or patient rep

## 2019-12-26 LAB
ANION GAP SERPL CALC-SCNC: 9 MMOL/L (ref 0–18)
BASOPHILS # BLD AUTO: 0.03 X10(3) UL (ref 0–0.2)
BASOPHILS NFR BLD AUTO: 0.3 %
BUN BLD-MCNC: 24 MG/DL (ref 7–18)
BUN/CREAT SERPL: 33.8 (ref 10–20)
CALCIUM BLD-MCNC: 7.7 MG/DL (ref 8.5–10.1)
CHLORIDE SERPL-SCNC: 95 MMOL/L (ref 98–112)
CO2 SERPL-SCNC: 26 MMOL/L (ref 21–32)
CREAT BLD-MCNC: 0.71 MG/DL (ref 0.7–1.3)
DEPRECATED RDW RBC AUTO: 41.3 FL (ref 35.1–46.3)
EOSINOPHIL # BLD AUTO: 0.05 X10(3) UL (ref 0–0.7)
EOSINOPHIL NFR BLD AUTO: 0.4 %
ERYTHROCYTE [DISTWIDTH] IN BLOOD BY AUTOMATED COUNT: 12.5 % (ref 11–15)
GLUCOSE BLD-MCNC: 117 MG/DL (ref 70–99)
HAV IGM SER QL: 2.2 MG/DL (ref 1.6–2.6)
HCT VFR BLD AUTO: 24.7 % (ref 39–53)
HGB BLD-MCNC: 8.4 G/DL (ref 13–17.5)
IMM GRANULOCYTES # BLD AUTO: 0.09 X10(3) UL (ref 0–1)
IMM GRANULOCYTES NFR BLD: 0.8 %
INR BLD: 3.37 (ref 0.9–1.2)
LYMPHOCYTES # BLD AUTO: 1.54 X10(3) UL (ref 1–4)
LYMPHOCYTES NFR BLD AUTO: 13.5 %
MCH RBC QN AUTO: 31.3 PG (ref 26–34)
MCHC RBC AUTO-ENTMCNC: 34 G/DL (ref 31–37)
MCV RBC AUTO: 92.2 FL (ref 80–100)
MONOCYTES # BLD AUTO: 1.74 X10(3) UL (ref 0.1–1)
MONOCYTES NFR BLD AUTO: 15.2 %
NEUTROPHILS # BLD AUTO: 7.96 X10 (3) UL (ref 1.5–7.7)
NEUTROPHILS # BLD AUTO: 7.96 X10(3) UL (ref 1.5–7.7)
NEUTROPHILS NFR BLD AUTO: 69.8 %
OSMOLALITY SERPL CALC.SUM OF ELEC: 275 MOSM/KG (ref 275–295)
PLATELET # BLD AUTO: 276 10(3)UL (ref 150–450)
POTASSIUM SERPL-SCNC: 4.4 MMOL/L (ref 3.5–5.1)
PROTHROMBIN TIME: 34.9 SECONDS (ref 11.8–14.5)
RBC # BLD AUTO: 2.68 X10(6)UL (ref 3.8–5.8)
SODIUM SERPL-SCNC: 130 MMOL/L (ref 136–145)
WBC # BLD AUTO: 11.4 X10(3) UL (ref 4–11)

## 2019-12-26 PROCEDURE — 99233 SBSQ HOSP IP/OBS HIGH 50: CPT | Performed by: HOSPITALIST

## 2019-12-26 PROCEDURE — 99231 SBSQ HOSP IP/OBS SF/LOW 25: CPT | Performed by: SURGERY

## 2019-12-26 NOTE — PROGRESS NOTES
[facility] H[facility]P - [facility]    Progress Note    Reji Ruby Patient Status:  Inpatient    1950 MRN [id number]   Location [facility] 2W/SW Attending Naina Lake MD   Hosp Day # 2 PCP Jane Cancino MD       Subjective:   Ether Sessions (H) 12/26/2019    TSH 1.54 04/24/2018    DDIMER 0.99 (H) 04/22/2018    MG 2.2 12/26/2019    TROP 0.00 04/22/2018    B12 338 04/24/2018       Ct Abdomen Pelvis Iv Contrast, No Oral (er)    Result Date: 12/24/2019  CONCLUSION:  1.  Large right rectus sheath h counseling re: treatment plan and work up.       Shasha Mireles MD

## 2019-12-26 NOTE — CONSULTS
[facility]    PATIENT'S NAME: Anali Arboleda   ATTENDING PHYSICIAN: Michael Fuller MD   CONSULTING PHYSICIAN: Kat Medel MD   PATIENT ACCOUNT#:   [id number]    LOCATION:  [address] [place] [address] Street #:   [id number]       DATE OF BIRTH around 1982. FAMILY HISTORY:  No known bleeding or clotting disorders. REVIEW OF SYSTEMS:  Otherwise negative. PHYSICAL EXAMINATION:    GENERAL:  Pleasant middle-aged male in mild distress.   He is normotensive or slightly hypertensive and mildly therapy. Would mobilize as soon as able. Timing for resuming anticoagulation therapy to be determined.      Dictated By Carmen Lazo MD  d: 12/26/2019 16:35:16  t: 12/26/2019 16:49:29  [facility] [id number]  [facility]/

## 2019-12-26 NOTE — PLAN OF CARE
Problem: Patient/Family Goals  Goal: Patient/Family Long Term Goal  Description  Patient's Long Term Goal: to go home     Interventions:  - increase activity   - improve appetite  - See additional Care Plan goals for specific interventions  Outcome: Prog pain and pain management  - Manage/alleviate anxiety  - Utilize distraction and/or relaxation techniques  - Monitor for opioid side effects  - Notify MD/LIP if interventions unsuccessful or patient reports new pain  - Anticipate increased pain with activit

## 2019-12-26 NOTE — PLAN OF CARE
Pt alert x4 Abdomen remains distended with bowel sounds, eccymotic lower abdomen being monitored. Pt taking Norco for pain in abdomen and surgical site as needed. Encouraged pt  to walk in hallway to aid in bowel function.  AM care done in BR by bernice , HR el integrity  - Monitor for areas of redness and/or skin breakdown  - Initiate interventions, skin care algorithm/standards of care as needed  Outcome: Progressing     Problem: PAIN - ADULT  Goal: Verbalizes/displays adequate comfort level or patient's stated

## 2019-12-26 NOTE — PROGRESS NOTES
[facility]    Progress Note    Eddy Adjutant Patient Status:  Inpatient    1950 MRN [id number]   Location [facility] 2W/SW Attending Elise Ramos MD   Hosp Day # 2 PCP Zonia Chowdhury MD     Assessment and Plan:     Leila Chan Net I/O     730 360 --          Exam: Abd distention from hematoma mostly RLQ, mildly tender, maybe sl enlarged    Results:     Lab Results   Component Value Date    WBC 11.4 (H) 12/26/2019    HGB 8.4 (L) 12/26/2019    HCT 24.7 (L) 12/26/2019    .0

## 2019-12-26 NOTE — PROGRESS NOTES
[facility] Cardiology  Progress Note    Caitlyn Angel Patient Status:  Inpatient    1950 MRN [id number]   Location [facility] 2W/SW Attending Elizabeth Villanueva MD   Hosp Day # 2 PCP Gregory Torres MD     Impression:   Impression: (TYLENOL) tab 650 mg, 650 mg, Oral, Q4H PRN    Or  HYDROcodone-acetaminophen (NORCO) 5-325 MG per tab 1 tablet, 1 tablet, Oral, Q4H PRN    Or  HYDROcodone-acetaminophen (NORCO) 5-325 MG per tab 2 tablet, 2 tablet, Oral, Q4H PRN  morphINE sulfate (PF) 2 MG/ 12/26/2019    CO2 26.0 12/26/2019    BUN 24 12/26/2019    CREATSERUM 0.71 12/26/2019     12/26/2019    CA 7.7 12/26/2019    MG 2.2 12/26/2019     No results for input(s): TROP, CK in the last 168 hours.     Contreras Fields MD  12/25/2019  5:55 AM

## 2019-12-26 NOTE — PLAN OF CARE
Problem: Patient/Family Goals  Goal: Patient/Family Long Term Goal  Description  Patient's Long Term Goal: to go home    Interventions:  - to be compliant with all his medication  -assist in ambulation and increase distances of walking as tolerated  - Se Administer analgesics based on type and severity of pain and evaluate response  - Implement non-pharmacological measures as appropriate and evaluate response  - Consider cultural and social influences on pain and pain management  - Manage/alleviate anxiety

## 2019-12-27 LAB
ANION GAP SERPL CALC-SCNC: 7 MMOL/L (ref 0–18)
BASOPHILS # BLD: 0 X10(3) UL (ref 0–0.2)
BASOPHILS NFR BLD: 0 %
BUN BLD-MCNC: 32 MG/DL (ref 7–18)
BUN/CREAT SERPL: 34.4 (ref 10–20)
CALCIUM BLD-MCNC: 7.6 MG/DL (ref 8.5–10.1)
CHLORIDE SERPL-SCNC: 94 MMOL/L (ref 98–112)
CO2 SERPL-SCNC: 26 MMOL/L (ref 21–32)
CREAT BLD-MCNC: 0.93 MG/DL (ref 0.7–1.3)
DEPRECATED RDW RBC AUTO: 41.9 FL (ref 35.1–46.3)
EOSINOPHIL # BLD: 0 X10(3) UL (ref 0–0.7)
EOSINOPHIL NFR BLD: 0 %
ERYTHROCYTE [DISTWIDTH] IN BLOOD BY AUTOMATED COUNT: 12.8 % (ref 11–15)
GLUCOSE BLD-MCNC: 124 MG/DL (ref 70–99)
HAV IGM SER QL: 2.2 MG/DL (ref 1.6–2.6)
HCT VFR BLD AUTO: 23.7 % (ref 39–53)
HGB BLD-MCNC: 8 G/DL (ref 13–17.5)
HGB BLD-MCNC: 8 G/DL (ref 13–17.5)
INR BLD: 3.72 (ref 0.9–1.2)
LYMPHOCYTES NFR BLD: 0.14 X10(3) UL (ref 1–4)
LYMPHOCYTES NFR BLD: 1 %
MCH RBC QN AUTO: 31.3 PG (ref 26–34)
MCHC RBC AUTO-ENTMCNC: 33.8 G/DL (ref 31–37)
MCV RBC AUTO: 92.6 FL (ref 80–100)
MONOCYTES # BLD: 0.99 X10(3) UL (ref 0.1–1)
MONOCYTES NFR BLD: 7 %
MORPHOLOGY: NORMAL
NEUTROPHILS # BLD AUTO: 10.56 X10 (3) UL (ref 1.5–7.7)
NEUTROPHILS NFR BLD: 89 %
NEUTS BAND NFR BLD: 3 %
NEUTS HYPERSEG # BLD: 12.97 X10(3) UL (ref 1.5–7.7)
NRBC BLD MANUAL-RTO: 2 %
OSMOLALITY SERPL CALC.SUM OF ELEC: 272 MOSM/KG (ref 275–295)
PLATELET # BLD AUTO: 348 10(3)UL (ref 150–450)
PLATELET MORPHOLOGY: NORMAL
POTASSIUM SERPL-SCNC: 4.2 MMOL/L (ref 3.5–5.1)
PROTHROMBIN TIME: 37.8 SECONDS (ref 11.8–14.5)
RBC # BLD AUTO: 2.56 X10(6)UL (ref 3.8–5.8)
SODIUM SERPL-SCNC: 127 MMOL/L (ref 136–145)
TOTAL CELLS COUNTED: 100
WBC # BLD AUTO: 14.1 X10(3) UL (ref 4–11)

## 2019-12-27 PROCEDURE — 99231 SBSQ HOSP IP/OBS SF/LOW 25: CPT | Performed by: SURGERY

## 2019-12-27 PROCEDURE — 99233 SBSQ HOSP IP/OBS HIGH 50: CPT | Performed by: HOSPITALIST

## 2019-12-27 NOTE — PROGRESS NOTES
[facility]    Progress Note    Sirena Setter Patient Status:  Inpatient    1950 MRN [id number]   Location [facility] 2W/SW Attending Piero Hinds MD   [facility] Day # 3 PCP Lewis Lund MD       Subjective:   Mansi Swartz 12/27/2019    CO2 26.0 12/27/2019     (H) 12/27/2019    CA 7.6 (L) 12/27/2019    ALB 3.5 11/20/2019    ALKPHO 112 11/20/2019    BILT 0.74 11/20/2019    TP 7.5 11/20/2019    AST 19 11/20/2019    ALT 23 11/20/2019    INR 3.72 (H) 12/27/2019    TSH 1.5

## 2019-12-27 NOTE — PLAN OF CARE
Problem: Patient/Family Goals  Goal: Patient/Family Long Term Goal  Description  Patient's Long Term Goal: to go home    Interventions:  - monitor INR   - monitor for bleeding  - bowel movement  - See additional Care Plan goals for specific interventions pain management  - Manage/alleviate anxiety  - Utilize distraction and/or relaxation techniques  - Monitor for opioid side effects  - Notify MD/LIP if interventions unsuccessful or patient reports new pain  - Anticipate increased pain with activity and pre

## 2019-12-27 NOTE — PROGRESS NOTES
[facility]  Progress Note    Abhi Franz Patient Status:  Inpatient    1950 MRN [id number]   Location [facility] 2W/ Attending Isis Campos MD   Hosp Day # 3 PCP Ellery Aschoff, MD     Impression:   Impression: mL, Intravenous, PRN  acetaminophen (TYLENOL) tab 650 mg, 650 mg, Oral, Q6H PRN  acetaminophen (TYLENOL) tab 650 mg, 650 mg, Oral, Q4H PRN    Or  HYDROcodone-acetaminophen (NORCO) 5-325 MG per tab 1 tablet, 1 tablet, Oral, Q4H PRN    Or  HYDROcodone-acetam 12/25/2019     Lab Results   Component Value Date     12/27/2019    K 4.2 12/27/2019    CL 94 12/27/2019    CO2 26.0 12/27/2019    BUN 32 12/27/2019    CREATSERUM 0.93 12/27/2019     12/27/2019    CA 7.6 12/27/2019    MG 2.2 12/27/2019     No

## 2019-12-27 NOTE — PHYSICAL THERAPY NOTE
PHYSICAL THERAPY EVALUATION - INPATIENT     Room Number: 315/315-A  Evaluation Date: 12/27/2019  Type of Evaluation: Initial   Physician Order: PT Eval and Treat    Presenting Problem: abdominal wall hematoma  Reason for Therapy: Mobility Dysfunction and Problem List  Principal Problem:    Abdominal wall hematoma, initial encounter  Active Problems:    Abdominal wall hematoma    Anemia, unspecified type      Past Medical History  Past Medical History:   Diagnosis Date   • ASHD (arteriosclerotic heart dis extremity strength is within functional limits BLE WNL    BALANCE  Static Sitting: Good  Dynamic Sitting: Good  Static Standing: Fair +  Dynamic Standing: Fair    AM-PAC '6-Clicks' INPATIENT SHORT FORM - BASIC MOBILITY  How much difficulty does the patient Patient to demonstrate independence with home activity/exercise instructions provided to patient in preparation for discharge.    Goal #5   Current Status    Goal #6    Goal #6  Current Status

## 2019-12-27 NOTE — PROGRESS NOTES
[facility]    Progress Note    Desma Fraction Patient Status:  Inpatient    1950 MRN [id number]   Location [facility] 3W/SW Attending Isaac Olivas MD   Hosp Day # 3 PCP Aguila Mishra MD     Assessment and Plan:     Grey Shah TP 7.5 11/20/2019    AST 19 11/20/2019    ALT 23 11/20/2019    INR 3.72 (H) 12/27/2019    TSH 1.54 04/24/2018    DDIMER 0.99 (H) 04/22/2018    MG 2.2 12/27/2019    TROP 0.00 04/22/2018    B12 338 04/24/2018                     Dean Norris MD  12/27/20

## 2019-12-28 ENCOUNTER — TELEPHONE (OUTPATIENT)
Dept: OTHER | Age: 69
End: 2019-12-28

## 2019-12-28 LAB
ALBUMIN SERPL-MCNC: 2.4 G/DL (ref 3.4–5)
ANION GAP SERPL CALC-SCNC: 4 MMOL/L (ref 0–18)
ANTIBODY SCREEN: NEGATIVE
BASOPHILS # BLD AUTO: 0.03 X10(3) UL (ref 0–0.2)
BASOPHILS NFR BLD AUTO: 0.3 %
BUN BLD-MCNC: 26 MG/DL (ref 7–18)
BUN/CREAT SERPL: 33.8 (ref 10–20)
CALCIUM BLD-MCNC: 7.9 MG/DL (ref 8.5–10.1)
CHLORIDE SERPL-SCNC: 94 MMOL/L (ref 98–112)
CO2 SERPL-SCNC: 30 MMOL/L (ref 21–32)
CREAT BLD-MCNC: 0.77 MG/DL (ref 0.7–1.3)
DEPRECATED RDW RBC AUTO: 43.3 FL (ref 35.1–46.3)
EOSINOPHIL # BLD AUTO: 0.17 X10(3) UL (ref 0–0.7)
EOSINOPHIL NFR BLD AUTO: 1.4 %
ERYTHROCYTE [DISTWIDTH] IN BLOOD BY AUTOMATED COUNT: 13.2 % (ref 11–15)
GLUCOSE BLD-MCNC: 106 MG/DL (ref 70–99)
HCT VFR BLD AUTO: 22.8 % (ref 39–53)
HGB BLD-MCNC: 7.5 G/DL (ref 13–17.5)
IMM GRANULOCYTES # BLD AUTO: 0.24 X10(3) UL (ref 0–1)
IMM GRANULOCYTES NFR BLD: 2 %
INR BLD: 3.69 (ref 0.9–1.2)
LYMPHOCYTES # BLD AUTO: 1.51 X10(3) UL (ref 1–4)
LYMPHOCYTES NFR BLD AUTO: 12.9 %
MCH RBC QN AUTO: 31.1 PG (ref 26–34)
MCHC RBC AUTO-ENTMCNC: 32.9 G/DL (ref 31–37)
MCV RBC AUTO: 94.6 FL (ref 80–100)
MONOCYTES # BLD AUTO: 1.77 X10(3) UL (ref 0.1–1)
MONOCYTES NFR BLD AUTO: 15.1 %
NEUTROPHILS # BLD AUTO: 8.03 X10 (3) UL (ref 1.5–7.7)
NEUTROPHILS # BLD AUTO: 8.03 X10(3) UL (ref 1.5–7.7)
NEUTROPHILS NFR BLD AUTO: 68.3 %
OSMOLALITY SERPL CALC.SUM OF ELEC: 271 MOSM/KG (ref 275–295)
PHOSPHATE SERPL-MCNC: 2.6 MG/DL (ref 2.5–4.9)
PLATELET # BLD AUTO: 379 10(3)UL (ref 150–450)
POTASSIUM SERPL-SCNC: 4.3 MMOL/L (ref 3.5–5.1)
PROTHROMBIN TIME: 37.6 SECONDS (ref 11.8–14.5)
RBC # BLD AUTO: 2.41 X10(6)UL (ref 3.8–5.8)
RH BLOOD TYPE: POSITIVE
SODIUM SERPL-SCNC: 128 MMOL/L (ref 136–145)
WBC # BLD AUTO: 11.8 X10(3) UL (ref 4–11)

## 2019-12-28 PROCEDURE — 99233 SBSQ HOSP IP/OBS HIGH 50: CPT | Performed by: HOSPITALIST

## 2019-12-28 RX ORDER — SODIUM CHLORIDE 9 MG/ML
INJECTION, SOLUTION INTRAVENOUS ONCE
Status: DISCONTINUED | OUTPATIENT
Start: 2019-12-28 | End: 2019-12-31

## 2019-12-28 NOTE — TELEPHONE ENCOUNTER
I am unable to order labs while patient is admitted to the hospital.  I will be happy to see him in the office after he is discharged and we can order a PSA then

## 2019-12-28 NOTE — TELEPHONE ENCOUNTER
Advised patient's sister (on BRIG[facility]) of Dr. Venice Heard note. She verbalized understanding and had no further questions.

## 2019-12-28 NOTE — PROGRESS NOTES
[facility]  Progress Note    Ane Coop Patient Status:  Inpatient    1950 MRN [id number]   Location [facility] 2W/SW Attending Karolina Garcia MD   Hosp Day # 4 PCP Priscila Montoya MD     Impression:   Impression: ER (TUSSIONEX) 10-8 MG/5ML liquid 5 mL, 5 mL, Oral, BID  Metoprolol Succinate ER (Toprol XL) 24 hr tab 25 mg, 25 mg, Oral, Daily Beta Blocker  atorvastatin (LIPITOR) tab 20 mg, 20 mg, Oral, Nightly  Normal Saline Flush 0.9 % injection 3 mL, 3 mL, Intraveno above.                   Lab Results   Component Value Date    WBC 11.8 12/28/2019    HGB 7.5 12/28/2019    HCT 22.8 12/28/2019    .0 12/28/2019     Lab Results   Component Value Date    INR 3.69 (H) 12/28/2019    INR 3.72 (H) 12/27/2019    INR 3.37

## 2019-12-28 NOTE — TELEPHONE ENCOUNTER
Patient's sister on [facility], called to ask if Dr. Teodora Pabon will order a PSA test as patient is currently in [facility]. They were going to ask before he was in the hospital but didn't have time.  They asked the hospitalist's but due to it having no relation as t

## 2019-12-28 NOTE — PLAN OF CARE
Problem: Patient/Family Goals  Goal: Patient/Family Long Term Goal  Descriptio  Patient's Long Term Goal:     Interventions:    - See additional Care Plan goals for specific interventions  Outcome: Progressing  Goal: Patient/Family Short Term Goal  Descr side effects  - Notify MD/LIP if interventions unsuccessful or patient reports new pain  - Anticipate increased pain with activity and pre-medicate as appropriate  Outcome: Progressing     Problem: Patient Centered Care  Goal: Patient preferences are ident

## 2019-12-28 NOTE — PLAN OF CARE
Juliann Lawrence resting in bed with no complaints of pain or trouble breathing at this time. Vital signs stable, medications given and reviewed. Call light in hand.     Problem: Patient/Family Goals  Goal: Patient/Family Long Term Goal  Description  Patient's Long Term development  - Assess and document skin integrity  - Monitor for areas of redness and/or skin breakdown  - Initiate interventions, skin care algorithm/standards of care as needed  12/28/2019 0919 by Maribell Paredes RN  Outcome: Progressing  12/28/201 Progressing  12/28/2019 0918 by Cristin Moran RN  Outcome: Progressing  12/28/2019 0916 by Cristin Moran, RN  Outcome: Progressing

## 2019-12-29 LAB
DEPRECATED RDW RBC AUTO: 44.2 FL (ref 35.1–46.3)
ERYTHROCYTE [DISTWIDTH] IN BLOOD BY AUTOMATED COUNT: 13.7 % (ref 11–15)
HCT VFR BLD AUTO: 23.2 % (ref 39–53)
HGB BLD-MCNC: 7.6 G/DL (ref 13–17.5)
INR BLD: 2.39 (ref 0.9–1.2)
MCH RBC QN AUTO: 31.4 PG (ref 26–34)
MCHC RBC AUTO-ENTMCNC: 32.8 G/DL (ref 31–37)
MCV RBC AUTO: 95.9 FL (ref 80–100)
PLATELET # BLD AUTO: 447 10(3)UL (ref 150–450)
PROTHROMBIN TIME: 26.4 SECONDS (ref 11.8–14.5)
RBC # BLD AUTO: 2.42 X10(6)UL (ref 3.8–5.8)
WBC # BLD AUTO: 12.4 X10(3) UL (ref 4–11)

## 2019-12-29 PROCEDURE — 99233 SBSQ HOSP IP/OBS HIGH 50: CPT | Performed by: HOSPITALIST

## 2019-12-29 RX ORDER — WARFARIN SODIUM 2.5 MG/1
2.5 TABLET ORAL NIGHTLY
Status: DISCONTINUED | OUTPATIENT
Start: 2019-12-29 | End: 2019-12-30

## 2019-12-29 NOTE — PROGRESS NOTES
[facility]    Progress Note    Juliann Ribera Patient Status:  Inpatient    1950 MRN [id number]   Location [facility] 2W/SW Attending Kamala Joaquin MD   [facility] Day # 4 PCP Radha Abbott MD       Subjective:   Fredy Ripple 12/28/2019    HCT 22.8 (L) 12/28/2019    .0 12/28/2019    CREATSERUM 0.77 12/28/2019    BUN 26 (H) 12/28/2019     (L) 12/28/2019    K 4.3 12/28/2019    CL 94 (L) 12/28/2019    CO2 30.0 12/28/2019     (H) 12/28/2019    CA 7.9 (L) 12/28/2

## 2019-12-29 NOTE — PLAN OF CARE
Problem: CARDIOVASCULAR - ADULT  Goal: Maintains optimal cardiac output and hemodynamic stability  Description  INTERVENTIONS:  - Monitor vital signs, rhythm, and trends  - Monitor for bleeding, hypotension and signs of decreased cardiac output  - Evalua usually independent   - Provide timely, complete, and accurate information to patient/family  - Incorporate patient and family knowledge, values, beliefs, and cultural backgrounds into the planning and delivery of care  - Encourage patient/family to partic

## 2019-12-29 NOTE — PROGRESS NOTES
[facility] Cardiology  Progress Note    Jeff Malin Patient Status:  Inpatient    1950 MRN [id number]   Location [facility] 2W/SW Attending Leopold Mcalpine, MD   Hosp Day # 5 PCP Kenyon Virgen MD     Impression:   Impression: Polst ER (TUSSIONEX) 10-8 MG/5ML liquid 5 mL, 5 mL, Oral, BID  Metoprolol Succinate ER (Toprol XL) 24 hr tab 25 mg, 25 mg, Oral, Daily Beta Blocker  atorvastatin (LIPITOR) tab 20 mg, 20 mg, Oral, Nightly  Normal Saline Flush 0.9 % injection 3 mL, 3 mL, Int above.            Recent Labs   Lab 12/28/19  0554 12/29/19  0557   RBC 2.41* 2.42*   HGB 7.5* 7.6*   HCT 22.8* 23.2*   MCV 94.6 95.9   MCH 31.1 31.4   MCHC 32.9 32.8   RDW 13.2 13.7   NEPRELIM 8.03*  --    WBC 11.8* 12.4*   .0 447.0         Lab Res

## 2019-12-30 LAB
DEPRECATED RDW RBC AUTO: 44.6 FL (ref 35.1–46.3)
ERYTHROCYTE [DISTWIDTH] IN BLOOD BY AUTOMATED COUNT: 14.9 % (ref 11–15)
HCT VFR BLD AUTO: 25.4 % (ref 39–53)
HGB BLD-MCNC: 8.2 G/DL (ref 13–17.5)
INR BLD: 1.94 (ref 0.9–1.2)
MCH RBC QN AUTO: 30.6 PG (ref 26–34)
MCHC RBC AUTO-ENTMCNC: 32.3 G/DL (ref 31–37)
MCV RBC AUTO: 94.8 FL (ref 80–100)
PLATELET # BLD AUTO: 485 10(3)UL (ref 150–450)
PROTHROMBIN TIME: 22.3 SECONDS (ref 11.8–14.5)
RBC # BLD AUTO: 2.68 X10(6)UL (ref 3.8–5.8)
WBC # BLD AUTO: 13.8 X10(3) UL (ref 4–11)

## 2019-12-30 PROCEDURE — 99233 SBSQ HOSP IP/OBS HIGH 50: CPT | Performed by: HOSPITALIST

## 2019-12-30 RX ORDER — HYDROCODONE POLISTIREX AND CHLORPHENIRAMINE POLISTIREX 10; 8 MG/5ML; MG/5ML
5 SUSPENSION, EXTENDED RELEASE ORAL 2 TIMES DAILY PRN
Qty: 115 ML | Refills: 0 | Status: SHIPPED | OUTPATIENT
Start: 2019-12-30 | End: 2020-01-21 | Stop reason: ALTCHOICE

## 2019-12-30 RX ORDER — TRAMADOL HYDROCHLORIDE 50 MG/1
50 TABLET ORAL EVERY 6 HOURS PRN
Qty: 20 TABLET | Refills: 0 | Status: SHIPPED | OUTPATIENT
Start: 2019-12-30 | End: 2020-01-21

## 2019-12-30 RX ORDER — TRAMADOL HYDROCHLORIDE 50 MG/1
50 TABLET ORAL EVERY 6 HOURS PRN
Status: DISCONTINUED | OUTPATIENT
Start: 2019-12-30 | End: 2019-12-31

## 2019-12-30 RX ORDER — WARFARIN SODIUM 5 MG/1
5 TABLET ORAL NIGHTLY
Status: DISCONTINUED | OUTPATIENT
Start: 2019-12-30 | End: 2019-12-31

## 2019-12-30 NOTE — CM/SW NOTE
09: 15AM  Received MDO for d/c planning - \"rehab\" specific. SW spoke w/ pt in his room. Pt has hx w/ [facility] - he requested a new referral be made for placement. ROMERO contacted [facility] and requested referral be sent via [address]. DON screen requested.

## 2019-12-30 NOTE — PLAN OF CARE
[facility] is resting in bed complaining of strange thoughts that aren't making any sense to him. Patient states he is feeling very weak and tired and thinks he may need a blood transfusion. Educated patient at this time.  Will make MD aware of patients complaints and request assistance  - Assess pain using appropriate pain scale  - Administer analgesics based on type and severity of pain and evaluate response  - Implement non-pharmacological measures as appropriate and evaluate response  - Consider cultural and soc

## 2019-12-30 NOTE — PLAN OF CARE
INCREASE ACTIVITY BUT DO NOT EXERT SELF. RESTART COUMADIN TONIGHT. CONTINUE TO MONITOR INR AND HGB.   Problem: Patient/Family Goals  Goal: Patient/Family Long Term Goal  Description  Patient's Long Term Goal: to stay healthy     Interventions:  - follow up Consider cultural and social influences on pain and pain management  - Manage/alleviate anxiety  - Utilize distraction and/or relaxation techniques  - Monitor for opioid side effects  - Notify MD/LIP if interventions unsuccessful or patient reports new jeni

## 2019-12-30 NOTE — PROGRESS NOTES
[facility]    Progress Note    Desma Fraction Patient Status:  Inpatient    1950 MRN [id number]   Location [facility] 2W/SW Attending Isaac Olivas MD   [address] Day # 6 PCP Aguila Mishra MD       Subjective:   Christiano Nunez BUN 26 (H) 12/28/2019     (L) 12/28/2019    K 4.3 12/28/2019    CL 94 (L) 12/28/2019    CO2 30.0 12/28/2019     (H) 12/28/2019    CA 7.9 (L) 12/28/2019    ALB 2.4 (L) 12/28/2019    ALKPHO 112 11/20/2019    BILT 0.74 11/20/2019    TP 7.5 11/20/

## 2019-12-30 NOTE — PROGRESS NOTES
[facility]    Progress Note    Rah Richey Patient Status:  Inpatient    1950 MRN [id number]   Location [facility] 2W/SW Attending Kierra Brunson MD   [facility] Day [facility] PCP Esther Wynn MD       Subjective:   Prudence Bo Value Date    WBC 12.4 (H) 12/29/2019    HGB 7.6 (L) 12/29/2019    HCT 23.2 (L) 12/29/2019    .0 12/29/2019    CREATSERUM 0.77 12/28/2019    BUN 26 (H) 12/28/2019     (L) 12/28/2019    K 4.3 12/28/2019    CL 94 (L) 12/28/2019    CO2 30.0 12/28 rehab      Patient was seen with his friend at bedside    Greater than 35 minutes spent, >50% spent counseling re: treatment plan and work up.

## 2019-12-30 NOTE — CM/SW NOTE
Case Management/ Progression of [facility] has accepted when medically cleared for discharge. SW/CM to remain available for support and/or discharge planning.          Kat Jimenez RN Case Manager   Ext. [phone]

## 2019-12-30 NOTE — PROGRESS NOTES
[facility] Cardiology  Progress Note    Ruddy Watson Patient Status:  Inpatient    1950 MRN [id number]   Location [facility] 2W/SW Attending Leida Murphy MD   [facility] Day # 6 PCP Pasty Cooks, MD     Impression:   Impression: Daily  Hydrocod Polst-CPM Polst ER (TUSSIONEX) 10-8 MG/5ML liquid 5 mL, 5 mL, Oral, BID  Metoprolol Succinate ER (Toprol XL) 24 hr tab 25 mg, 25 mg, Oral, Daily Beta Blocker  atorvastatin (LIPITOR) tab 20 mg, 20 mg, Oral, Nightly  Normal Saline Flush 0.9 % findings as above.             Recent Labs   Lab 12/28/19  0554  12/30/19  0709   RBC 2.41*   < > 2.68*   HGB 7.5*   < > 8.2*   HCT 22.8*   < > 25.4*   MCV 94.6   < > 94.8   MCH 31.1   < > 30.6   MCHC 32.9   < > 32.3   RDW 13.2   < > 14.9   NEPRELIM 8.03*

## 2019-12-31 VITALS
HEIGHT: 70 IN | HEART RATE: 109 BPM | DIASTOLIC BLOOD PRESSURE: 76 MMHG | OXYGEN SATURATION: 96 % | RESPIRATION RATE: 20 BRPM | SYSTOLIC BLOOD PRESSURE: 132 MMHG | BODY MASS INDEX: 25.98 KG/M2 | TEMPERATURE: 98 F | WEIGHT: 181.5 LBS

## 2019-12-31 LAB
ALBUMIN SERPL-MCNC: 2.3 G/DL (ref 3.4–5)
ANION GAP SERPL CALC-SCNC: 7 MMOL/L (ref 0–18)
BASOPHILS # BLD AUTO: 0.06 X10(3) UL (ref 0–0.2)
BASOPHILS NFR BLD AUTO: 0.5 %
BUN BLD-MCNC: 22 MG/DL (ref 7–18)
BUN/CREAT SERPL: 28.9 (ref 10–20)
CALCIUM BLD-MCNC: 8.2 MG/DL (ref 8.5–10.1)
CHLORIDE SERPL-SCNC: 95 MMOL/L (ref 98–112)
CO2 SERPL-SCNC: 28 MMOL/L (ref 21–32)
CREAT BLD-MCNC: 0.76 MG/DL (ref 0.7–1.3)
DEPRECATED RDW RBC AUTO: 48.9 FL (ref 35.1–46.3)
EOSINOPHIL # BLD AUTO: 0.22 X10(3) UL (ref 0–0.7)
EOSINOPHIL NFR BLD AUTO: 1.8 %
ERYTHROCYTE [DISTWIDTH] IN BLOOD BY AUTOMATED COUNT: 15.9 % (ref 11–15)
GLUCOSE BLD-MCNC: 103 MG/DL (ref 70–99)
HAV IGM SER QL: 2.3 MG/DL (ref 1.6–2.6)
HCT VFR BLD AUTO: 25.3 % (ref 39–53)
HGB BLD-MCNC: 8.1 G/DL (ref 13–17.5)
IMM GRANULOCYTES # BLD AUTO: 0.21 X10(3) UL (ref 0–1)
IMM GRANULOCYTES NFR BLD: 1.7 %
INR BLD: 2.56 (ref 0.9–1.2)
LYMPHOCYTES # BLD AUTO: 1.52 X10(3) UL (ref 1–4)
LYMPHOCYTES NFR BLD AUTO: 12.6 %
MCH RBC QN AUTO: 31 PG (ref 26–34)
MCHC RBC AUTO-ENTMCNC: 32 G/DL (ref 31–37)
MCV RBC AUTO: 96.9 FL (ref 80–100)
MONOCYTES # BLD AUTO: 1.95 X10(3) UL (ref 0.1–1)
MONOCYTES NFR BLD AUTO: 16.2 %
NEUTROPHILS # BLD AUTO: 8.06 X10 (3) UL (ref 1.5–7.7)
NEUTROPHILS # BLD AUTO: 8.06 X10(3) UL (ref 1.5–7.7)
NEUTROPHILS NFR BLD AUTO: 67.2 %
OSMOLALITY SERPL CALC.SUM OF ELEC: 274 MOSM/KG (ref 275–295)
PHOSPHATE SERPL-MCNC: 3.4 MG/DL (ref 2.5–4.9)
PLATELET # BLD AUTO: 484 10(3)UL (ref 150–450)
POTASSIUM SERPL-SCNC: 3.9 MMOL/L (ref 3.5–5.1)
PROTHROMBIN TIME: 27.9 SECONDS (ref 11.8–14.5)
RBC # BLD AUTO: 2.61 X10(6)UL (ref 3.8–5.8)
SODIUM SERPL-SCNC: 130 MMOL/L (ref 136–145)
WBC # BLD AUTO: 12 X10(3) UL (ref 4–11)

## 2019-12-31 PROCEDURE — 99239 HOSP IP/OBS DSCHRG MGMT >30: CPT | Performed by: HOSPITALIST

## 2019-12-31 RX ORDER — ATORVASTATIN CALCIUM 20 MG/1
20 TABLET, FILM COATED ORAL NIGHTLY
Qty: 30 TABLET | Refills: 0 | Status: SHIPPED
Start: 2019-12-31 | End: 2020-07-24

## 2019-12-31 RX ORDER — WARFARIN SODIUM 2 MG/1
2 TABLET ORAL NIGHTLY
Qty: 30 TABLET | Refills: 0 | Status: SHIPPED
Start: 2019-12-31

## 2019-12-31 NOTE — PLAN OF CARE
Problem: Patient/Family Goals  Goal: Patient/Family Long Term Goal  Description  Patient's Long Term Goal: to stay healthy     Interventions:  - follow up with physicians   - See additional Care Plan goals for specific interventions   Outcome: Progressin Utilize distraction and/or relaxation techniques  - Monitor for opioid side effects  - Notify MD/LIP if interventions unsuccessful or patient reports new pain  - Anticipate increased pain with activity and pre-medicate as appropriate  Outcome: Progressing

## 2019-12-31 NOTE — PLAN OF CARE
Problem: Patient/Family Goals  Goal: Patient/Family Long Term Goal  Description  Patient's Long Term Goal: to stay healthy     Interventions:  - follow up with physicians   - See additional Care Plan goals for specific interventions   Outcome: Progressin

## 2019-12-31 NOTE — CM/SW NOTE
08: 30AM  Received message from [facility] w/ Xiao - pt clinically accepted but will not have private room until (poss) late Tuesday or maybe Wednesday. SW also received message from [place] w/ KARLA - pt is clinically accepted and private room is available.

## 2019-12-31 NOTE — PROGRESS NOTES
[facility] [address] [place] [address] Cardiology  Progress Note    Gisel Alanakaya Patient Status:  Inpatient    1950 MRN [id number]   Location [facility] 2W/ Attending Monse Fay MD   Hosp Day # 7 PCP Lian Diaz MD     Impression:   Impression: 150-0.025-1 MG cap CAPS 1 capsule, 1 capsule, Oral, Daily  Hydrocod Polst-CPM Polst ER (TUSSIONEX) 10-8 MG/5ML liquid 5 mL, 5 mL, Oral, BID  Metoprolol Succinate ER (Toprol XL) 24 hr tab 25 mg, 25 mg, Oral, Daily Beta Blocker  atorvastatin (LIPITOR) tab 20 RDW 15.9*   NEPRELIM 8.06*   WBC 12.0*   .0*         Lab Results   Component Value Date    WBC 12.0 12/31/2019    HGB 8.1 12/31/2019    HCT 25.3 12/31/2019    .0 12/31/2019     Lab Results   Component Value Date    INR 2.56 (H) 12/31/2019

## 2019-12-31 NOTE — PLAN OF CARE
Report given to [address] [place] [address] at [phone], all questions answered. Saline lock and tele removed. All belongings sent with patient. Transported via [address]

## 2020-01-01 LAB — BLOOD TYPE BARCODE: 5100

## 2020-01-20 ENCOUNTER — ANTI-COAG VISIT (OUTPATIENT)
Dept: INTERNAL MEDICINE CLINIC | Facility: CLINIC | Age: 70
End: 2020-01-20
Payer: MEDICARE

## 2020-01-20 DIAGNOSIS — I25.10 CAD IN NATIVE ARTERY: ICD-10-CM

## 2020-01-20 DIAGNOSIS — Z79.01 LONG TERM (CURRENT) USE OF ANTICOAGULANTS: ICD-10-CM

## 2020-01-20 DIAGNOSIS — I48.20 CHRONIC ATRIAL FIBRILLATION (HCC): ICD-10-CM

## 2020-01-20 DIAGNOSIS — I48.21 PERMANENT ATRIAL FIBRILLATION (HCC): ICD-10-CM

## 2020-01-20 DIAGNOSIS — I34.0 NON-RHEUMATIC MITRAL REGURGITATION: ICD-10-CM

## 2020-01-20 DIAGNOSIS — Z51.81 ENCOUNTER FOR THERAPEUTIC DRUG MONITORING: ICD-10-CM

## 2020-01-20 LAB
INR: 1.9 (ref 0.8–1.2)
TEST STRIP EXPIRATION DATE: ABNORMAL DATE

## 2020-01-20 PROCEDURE — 85610 PROTHROMBIN TIME: CPT

## 2020-01-20 PROCEDURE — 36416 COLLJ CAPILLARY BLOOD SPEC: CPT

## 2020-01-21 ENCOUNTER — OFFICE VISIT (OUTPATIENT)
Dept: INTERNAL MEDICINE CLINIC | Facility: CLINIC | Age: 70
End: 2020-01-21
Payer: MEDICARE

## 2020-01-21 VITALS
WEIGHT: 174.5 LBS | HEIGHT: 70 IN | DIASTOLIC BLOOD PRESSURE: 64 MMHG | BODY MASS INDEX: 24.98 KG/M2 | HEART RATE: 87 BPM | TEMPERATURE: 98 F | SYSTOLIC BLOOD PRESSURE: 128 MMHG

## 2020-01-21 DIAGNOSIS — D62 ANEMIA DUE TO ACUTE BLOOD LOSS: ICD-10-CM

## 2020-01-21 DIAGNOSIS — S30.1XXD ABDOMINAL WALL HEMATOMA, SUBSEQUENT ENCOUNTER: Primary | ICD-10-CM

## 2020-01-21 PROCEDURE — 1111F DSCHRG MED/CURRENT MED MERGE: CPT | Performed by: INTERNAL MEDICINE

## 2020-01-21 PROCEDURE — 99214 OFFICE O/P EST MOD 30 MIN: CPT | Performed by: INTERNAL MEDICINE

## 2020-01-21 PROCEDURE — G0463 HOSPITAL OUTPT CLINIC VISIT: HCPCS | Performed by: INTERNAL MEDICINE

## 2020-01-21 NOTE — PROGRESS NOTES
Mallory Armendariz is a 68year old male. Patient presents with:  Hospital F/U: patient went to [address] [place] [address] on 12/24/19    HPI:   Mr. Nik Gibbons presents this afternoon for hospital follow-up.     He was hospitalized at [facility] on December 24 with an abdomina total) by mouth nightly. TAKE ONE TABLET BY MOUTH ONCE DAILY 30 tablet 0   • Warfarin Sodium 2 MG Oral Tab Take 1 tablet (2 mg total) by mouth nightly. No coumadin tonight 12/31. Start taking on Wednesday 1/1.  INR on Friday 1/3, call MD for instructions on Used    Alcohol use: No      Alcohol/week: 0.0 standard drinks      Comment: None since 1980    Drug use: No       EXAM:   GENERAL: Pleasant male appearing well in no distress  /64   Pulse 87   Temp 97.9 °F (36.6 °C) (Oral)   Ht 5' 10\" (1.778 m)   W

## 2020-01-21 NOTE — PATIENT INSTRUCTIONS
Continue current medications. Follow-up with Cardiology as planned in 2 days on Thursday. Check CBC in 2 weeks. Schedule a Medicare physical soon.

## 2020-01-24 NOTE — TELEPHONE ENCOUNTER
MTN please see message below from JASMIN re: script    Review pended refill request as it does not fall under a protocol.     Last Rx: 1/1/20  LOV: 3 days ago

## 2020-01-24 NOTE — TELEPHONE ENCOUNTER
Patient calling and states he was in a rehab for internal bleeding and another Dr. Burnis Meckel a medication for him and he is trying to see if Dr. Marcy Hidalgo can prescribe it for him it is called he just send Dr. Marcy Hidalgo not too long ago Tuesday and he thought he c

## 2020-02-05 ENCOUNTER — APPOINTMENT (OUTPATIENT)
Dept: LAB | Facility: HOSPITAL | Age: 70
End: 2020-02-05
Attending: INTERNAL MEDICINE
Payer: MEDICARE

## 2020-02-05 DIAGNOSIS — D62 ANEMIA DUE TO ACUTE BLOOD LOSS: ICD-10-CM

## 2020-02-05 LAB
DEPRECATED RDW RBC AUTO: 50.2 FL (ref 35.1–46.3)
ERYTHROCYTE [DISTWIDTH] IN BLOOD BY AUTOMATED COUNT: 14.4 % (ref 11–15)
HCT VFR BLD AUTO: 41.4 % (ref 39–53)
HGB BLD-MCNC: 13 G/DL (ref 13–17.5)
MCH RBC QN AUTO: 29.6 PG (ref 26–34)
MCHC RBC AUTO-ENTMCNC: 31.4 G/DL (ref 31–37)
MCV RBC AUTO: 94.3 FL (ref 80–100)
PLATELET # BLD AUTO: 326 10(3)UL (ref 150–450)
RBC # BLD AUTO: 4.39 X10(6)UL (ref 3.8–5.8)
WBC # BLD AUTO: 6.7 X10(3) UL (ref 4–11)

## 2020-02-05 PROCEDURE — 85027 COMPLETE CBC AUTOMATED: CPT

## 2020-02-05 PROCEDURE — 36415 COLL VENOUS BLD VENIPUNCTURE: CPT

## 2020-03-02 NOTE — DISCHARGE SUMMARY
[facility] HOSPITALIST  DISCHARGE SUMMARY     Addie Herrera Patient Status:  Inpatient    1950 MRN [id number]   Location [facility] 3W/SW Attending No att. providers found   Hosp Day # 7 PCP Vannessa Piedra MD     Date of Admission: 20 this bleeding vessel  initially monitoring in PCU, now on tele  Coumadin held  INR initially was 3.7   S/p FFP  Allowed inr to drift down  per cardiology likely switch to [id number] [address] Lozada some point in the future  Pain control with IV morphine/norco-->change to D OR      Take 1,000 Units by mouth daily.    Refills:  0        STOP taking these medications    aspirin 81 MG Tabs              Where to Get Your Medications      Information about where to get these medications is not yet available    Ask your nurse or d

## 2020-03-11 ENCOUNTER — ANTI-COAG VISIT (OUTPATIENT)
Dept: INTERNAL MEDICINE CLINIC | Facility: CLINIC | Age: 70
End: 2020-03-11
Payer: MEDICARE

## 2020-03-11 DIAGNOSIS — I25.10 CAD IN NATIVE ARTERY: ICD-10-CM

## 2020-03-11 DIAGNOSIS — I48.20 CHRONIC ATRIAL FIBRILLATION (HCC): ICD-10-CM

## 2020-03-11 DIAGNOSIS — I48.21 PERMANENT ATRIAL FIBRILLATION (HCC): ICD-10-CM

## 2020-03-11 DIAGNOSIS — I34.0 NON-RHEUMATIC MITRAL REGURGITATION: ICD-10-CM

## 2020-03-11 DIAGNOSIS — Z79.01 LONG TERM (CURRENT) USE OF ANTICOAGULANTS: ICD-10-CM

## 2020-03-11 DIAGNOSIS — Z51.81 ENCOUNTER FOR THERAPEUTIC DRUG MONITORING: ICD-10-CM

## 2020-03-11 LAB — INR: 1.5 (ref 2–3)

## 2020-03-11 PROCEDURE — 36416 COLLJ CAPILLARY BLOOD SPEC: CPT

## 2020-03-11 PROCEDURE — 85610 PROTHROMBIN TIME: CPT

## 2020-03-25 ENCOUNTER — TELEPHONE (OUTPATIENT)
Dept: INTERNAL MEDICINE CLINIC | Facility: CLINIC | Age: 70
End: 2020-03-25

## 2020-03-25 ENCOUNTER — ANTI-COAG VISIT (OUTPATIENT)
Dept: INTERNAL MEDICINE CLINIC | Facility: CLINIC | Age: 70
End: 2020-03-25
Payer: MEDICARE

## 2020-03-25 DIAGNOSIS — I25.10 CAD IN NATIVE ARTERY: ICD-10-CM

## 2020-03-25 DIAGNOSIS — Z79.01 LONG TERM (CURRENT) USE OF ANTICOAGULANTS: ICD-10-CM

## 2020-03-25 DIAGNOSIS — Z51.81 ENCOUNTER FOR THERAPEUTIC DRUG MONITORING: ICD-10-CM

## 2020-03-25 DIAGNOSIS — I48.20 CHRONIC ATRIAL FIBRILLATION (HCC): ICD-10-CM

## 2020-03-25 DIAGNOSIS — I48.21 PERMANENT ATRIAL FIBRILLATION (HCC): ICD-10-CM

## 2020-03-25 DIAGNOSIS — I34.0 NON-RHEUMATIC MITRAL REGURGITATION: ICD-10-CM

## 2020-03-25 LAB
INR: 3.5 (ref 0.8–1.2)
TEST STRIP EXPIRATION DATE: ABNORMAL DATE

## 2020-03-25 PROCEDURE — 85610 PROTHROMBIN TIME: CPT

## 2020-03-25 PROCEDURE — 36416 COLLJ CAPILLARY BLOOD SPEC: CPT

## 2020-03-25 NOTE — TELEPHONE ENCOUNTER
Today's INR 3.5  Goal 2-3  Protocol decrease by 10% recheck INR 1-2 weeks  Actual decrease 8.3% rechecking INR in 2 weeks

## 2020-03-26 ENCOUNTER — TELEPHONE (OUTPATIENT)
Dept: INTERNAL MEDICINE CLINIC | Facility: CLINIC | Age: 70
End: 2020-03-26

## 2020-03-26 DIAGNOSIS — Z12.5 PROSTATE CANCER SCREENING: Primary | ICD-10-CM

## 2020-03-26 NOTE — TELEPHONE ENCOUNTER
Patient's sister, Meaghan Martinez, is requesting a PSA blood work for her brother. They are concern with patient's prostate levels. Please call patient once order is in the system. Thank you.

## 2020-03-26 NOTE — TELEPHONE ENCOUNTER
Screening PSA ordered at his request. He should avoid coming to the lab for routine testing during the current Covid 19 pandemic

## 2020-04-08 ENCOUNTER — ANTI-COAG VISIT (OUTPATIENT)
Dept: INTERNAL MEDICINE CLINIC | Facility: CLINIC | Age: 70
End: 2020-04-08
Payer: MEDICARE

## 2020-04-08 DIAGNOSIS — I48.20 CHRONIC ATRIAL FIBRILLATION (HCC): ICD-10-CM

## 2020-04-08 DIAGNOSIS — I34.0 NON-RHEUMATIC MITRAL REGURGITATION: ICD-10-CM

## 2020-04-08 DIAGNOSIS — I25.10 CAD IN NATIVE ARTERY: ICD-10-CM

## 2020-04-08 DIAGNOSIS — Z51.81 ENCOUNTER FOR THERAPEUTIC DRUG MONITORING: ICD-10-CM

## 2020-04-08 DIAGNOSIS — I48.21 PERMANENT ATRIAL FIBRILLATION (HCC): ICD-10-CM

## 2020-04-08 DIAGNOSIS — Z79.01 LONG TERM (CURRENT) USE OF ANTICOAGULANTS: ICD-10-CM

## 2020-04-08 PROCEDURE — 85610 PROTHROMBIN TIME: CPT

## 2020-04-08 PROCEDURE — 36416 COLLJ CAPILLARY BLOOD SPEC: CPT

## 2020-05-06 ENCOUNTER — TELEPHONE (OUTPATIENT)
Dept: INTERNAL MEDICINE CLINIC | Facility: CLINIC | Age: 70
End: 2020-05-06

## 2020-05-06 ENCOUNTER — ANTI-COAG VISIT (OUTPATIENT)
Dept: INTERNAL MEDICINE CLINIC | Facility: CLINIC | Age: 70
End: 2020-05-06
Payer: MEDICARE

## 2020-05-06 DIAGNOSIS — I48.20 CHRONIC ATRIAL FIBRILLATION (HCC): ICD-10-CM

## 2020-05-06 DIAGNOSIS — Z79.01 LONG TERM (CURRENT) USE OF ANTICOAGULANTS: ICD-10-CM

## 2020-05-06 DIAGNOSIS — I25.10 CAD IN NATIVE ARTERY: ICD-10-CM

## 2020-05-06 DIAGNOSIS — Z51.81 ENCOUNTER FOR THERAPEUTIC DRUG MONITORING: ICD-10-CM

## 2020-05-06 DIAGNOSIS — I34.0 NON-RHEUMATIC MITRAL REGURGITATION: ICD-10-CM

## 2020-05-06 DIAGNOSIS — I48.21 PERMANENT ATRIAL FIBRILLATION (HCC): ICD-10-CM

## 2020-05-06 PROCEDURE — 36416 COLLJ CAPILLARY BLOOD SPEC: CPT

## 2020-05-06 PROCEDURE — 85610 PROTHROMBIN TIME: CPT

## 2020-05-06 NOTE — TELEPHONE ENCOUNTER
INR 3.5  Goal 2.0-3.0  5/6 patient understands, per protocol to increase weekly dosage by 10%, actual 9.1%. Lab in 1-2 weeks.  LM

## 2020-05-19 ENCOUNTER — ANTI-COAG VISIT (OUTPATIENT)
Dept: INTERNAL MEDICINE CLINIC | Facility: CLINIC | Age: 70
End: 2020-05-19
Payer: MEDICARE

## 2020-05-19 DIAGNOSIS — I34.0 NON-RHEUMATIC MITRAL REGURGITATION: ICD-10-CM

## 2020-05-19 DIAGNOSIS — Z51.81 ENCOUNTER FOR THERAPEUTIC DRUG MONITORING: ICD-10-CM

## 2020-05-19 DIAGNOSIS — I48.20 CHRONIC ATRIAL FIBRILLATION (HCC): ICD-10-CM

## 2020-05-19 DIAGNOSIS — I48.21 PERMANENT ATRIAL FIBRILLATION (HCC): ICD-10-CM

## 2020-05-19 DIAGNOSIS — Z79.01 LONG TERM (CURRENT) USE OF ANTICOAGULANTS: ICD-10-CM

## 2020-05-19 DIAGNOSIS — I25.10 CAD IN NATIVE ARTERY: ICD-10-CM

## 2020-05-19 PROCEDURE — 36416 COLLJ CAPILLARY BLOOD SPEC: CPT

## 2020-05-19 PROCEDURE — 85610 PROTHROMBIN TIME: CPT

## 2020-06-24 ENCOUNTER — TELEPHONE (OUTPATIENT)
Dept: INTERNAL MEDICINE CLINIC | Facility: CLINIC | Age: 70
End: 2020-06-24

## 2020-06-24 NOTE — TELEPHONE ENCOUNTER
Patient requesting appointment. Was a little upset he could not schedule now. States he will like a call in the next 45 mins or he would stop at the clinic to make appointment.  Please advice

## 2020-06-24 NOTE — TELEPHONE ENCOUNTER
Called and spoke with Xavier Monet. [facility] appointment made for tomorrow 6/25.

## 2020-06-25 ENCOUNTER — ANTI-COAG VISIT (OUTPATIENT)
Dept: INTERNAL MEDICINE CLINIC | Facility: CLINIC | Age: 70
End: 2020-06-25
Payer: MEDICARE

## 2020-06-25 ENCOUNTER — TELEPHONE (OUTPATIENT)
Dept: INTERNAL MEDICINE CLINIC | Facility: CLINIC | Age: 70
End: 2020-06-25

## 2020-06-25 DIAGNOSIS — I48.21 PERMANENT ATRIAL FIBRILLATION (HCC): ICD-10-CM

## 2020-06-25 DIAGNOSIS — I48.20 CHRONIC ATRIAL FIBRILLATION (HCC): ICD-10-CM

## 2020-06-25 DIAGNOSIS — Z79.01 LONG TERM (CURRENT) USE OF ANTICOAGULANTS: ICD-10-CM

## 2020-06-25 DIAGNOSIS — I34.0 NON-RHEUMATIC MITRAL REGURGITATION: ICD-10-CM

## 2020-06-25 DIAGNOSIS — Z51.81 ENCOUNTER FOR THERAPEUTIC DRUG MONITORING: ICD-10-CM

## 2020-06-25 DIAGNOSIS — I25.10 CAD IN NATIVE ARTERY: ICD-10-CM

## 2020-06-25 PROCEDURE — 85610 PROTHROMBIN TIME: CPT

## 2020-06-25 PROCEDURE — 36416 COLLJ CAPILLARY BLOOD SPEC: CPT

## 2020-06-25 NOTE — TELEPHONE ENCOUNTER
Today's INR 3.7  Goal 2-3  Protocol: hold 2 doses and decrease by 20% recheck in 1-2 weeks    Actual: instructed to hold 2 doses and follow what he was previously instructed to do (5 mg 3 days a week and 2.5 mg 4 days a week).  He was taking 5 mg 4 days a w

## 2020-07-02 ENCOUNTER — ANTI-COAG VISIT (OUTPATIENT)
Dept: INTERNAL MEDICINE CLINIC | Facility: CLINIC | Age: 70
End: 2020-07-02
Payer: MEDICARE

## 2020-07-02 DIAGNOSIS — I48.21 PERMANENT ATRIAL FIBRILLATION (HCC): ICD-10-CM

## 2020-07-02 DIAGNOSIS — Z51.81 ENCOUNTER FOR THERAPEUTIC DRUG MONITORING: ICD-10-CM

## 2020-07-02 DIAGNOSIS — I34.0 NON-RHEUMATIC MITRAL REGURGITATION: ICD-10-CM

## 2020-07-02 DIAGNOSIS — I48.20 CHRONIC ATRIAL FIBRILLATION (HCC): ICD-10-CM

## 2020-07-02 DIAGNOSIS — Z79.01 LONG TERM (CURRENT) USE OF ANTICOAGULANTS: ICD-10-CM

## 2020-07-02 DIAGNOSIS — I25.10 CAD IN NATIVE ARTERY: ICD-10-CM

## 2020-07-02 LAB — INR: 1.7 (ref 0.8–1.2)

## 2020-07-02 PROCEDURE — 36416 COLLJ CAPILLARY BLOOD SPEC: CPT

## 2020-07-02 PROCEDURE — 85610 PROTHROMBIN TIME: CPT

## 2020-07-16 ENCOUNTER — ANTI-COAG VISIT (OUTPATIENT)
Dept: INTERNAL MEDICINE CLINIC | Facility: CLINIC | Age: 70
End: 2020-07-16
Payer: MEDICARE

## 2020-07-16 DIAGNOSIS — Z51.81 ENCOUNTER FOR THERAPEUTIC DRUG MONITORING: ICD-10-CM

## 2020-07-16 DIAGNOSIS — I34.0 NON-RHEUMATIC MITRAL REGURGITATION: ICD-10-CM

## 2020-07-16 DIAGNOSIS — I48.21 PERMANENT ATRIAL FIBRILLATION (HCC): ICD-10-CM

## 2020-07-16 DIAGNOSIS — I25.10 CAD IN NATIVE ARTERY: ICD-10-CM

## 2020-07-16 DIAGNOSIS — I48.20 CHRONIC ATRIAL FIBRILLATION (HCC): ICD-10-CM

## 2020-07-16 DIAGNOSIS — Z79.01 LONG TERM (CURRENT) USE OF ANTICOAGULANTS: ICD-10-CM

## 2020-07-16 LAB
INR: 3 (ref 0.8–1.2)
TEST STRIP EXPIRATION DATE: ABNORMAL DATE

## 2020-07-16 PROCEDURE — 85610 PROTHROMBIN TIME: CPT

## 2020-07-16 PROCEDURE — 36416 COLLJ CAPILLARY BLOOD SPEC: CPT

## 2020-07-30 ENCOUNTER — ANTI-COAG VISIT (OUTPATIENT)
Dept: INTERNAL MEDICINE CLINIC | Facility: CLINIC | Age: 70
End: 2020-07-30
Payer: MEDICARE

## 2020-07-30 DIAGNOSIS — Z51.81 ENCOUNTER FOR THERAPEUTIC DRUG MONITORING: ICD-10-CM

## 2020-07-30 DIAGNOSIS — I34.0 NON-RHEUMATIC MITRAL REGURGITATION: ICD-10-CM

## 2020-07-30 DIAGNOSIS — I48.21 PERMANENT ATRIAL FIBRILLATION (HCC): ICD-10-CM

## 2020-07-30 DIAGNOSIS — Z79.01 LONG TERM (CURRENT) USE OF ANTICOAGULANTS: ICD-10-CM

## 2020-07-30 DIAGNOSIS — I25.10 CAD IN NATIVE ARTERY: ICD-10-CM

## 2020-07-30 DIAGNOSIS — I48.20 CHRONIC ATRIAL FIBRILLATION (HCC): ICD-10-CM

## 2020-07-30 LAB
INR: 3 (ref 0.8–1.2)
TEST STRIP EXPIRATION DATE: ABNORMAL DATE

## 2020-07-30 PROCEDURE — 36416 COLLJ CAPILLARY BLOOD SPEC: CPT

## 2020-07-30 PROCEDURE — 85610 PROTHROMBIN TIME: CPT

## 2020-10-16 ENCOUNTER — TELEPHONE (OUTPATIENT)
Dept: INTERNAL MEDICINE CLINIC | Facility: CLINIC | Age: 70
End: 2020-10-16

## 2020-10-16 NOTE — TELEPHONE ENCOUNTER
Informed patient of the need to schedule a physical with Dr. Rebel Ku. Patient states he needs to look at his schedule and call back.

## 2020-12-21 ENCOUNTER — TELEPHONE (OUTPATIENT)
Dept: INTERNAL MEDICINE CLINIC | Facility: CLINIC | Age: 70
End: 2020-12-21

## 2020-12-21 NOTE — TELEPHONE ENCOUNTER
Pt would like to schedule an appointment with the coumadin clinic. Pt is leaving to [place] and states that he needs to be seen today or tomorrow by 12:00. Natalie Jameson

## 2020-12-21 NOTE — TELEPHONE ENCOUNTER
Called and left message for Sandra Ozuna. Let him know I will put him in the slot for 1:15pm tomorrow but he may come at 11am for [facility] appointment. If that does not work, asked him to return call to [facility].

## 2020-12-22 ENCOUNTER — ANTI-COAG VISIT (OUTPATIENT)
Dept: INTERNAL MEDICINE CLINIC | Facility: CLINIC | Age: 70
End: 2020-12-22
Payer: MEDICARE

## 2020-12-22 ENCOUNTER — TELEPHONE (OUTPATIENT)
Dept: INTERNAL MEDICINE CLINIC | Facility: CLINIC | Age: 70
End: 2020-12-22

## 2020-12-22 DIAGNOSIS — Z51.81 ENCOUNTER FOR THERAPEUTIC DRUG MONITORING: ICD-10-CM

## 2020-12-22 DIAGNOSIS — Z79.01 LONG TERM (CURRENT) USE OF ANTICOAGULANTS: ICD-10-CM

## 2020-12-22 DIAGNOSIS — I34.0 NON-RHEUMATIC MITRAL REGURGITATION: ICD-10-CM

## 2020-12-22 DIAGNOSIS — I25.10 CAD IN NATIVE ARTERY: ICD-10-CM

## 2020-12-22 DIAGNOSIS — I48.20 CHRONIC ATRIAL FIBRILLATION (HCC): ICD-10-CM

## 2020-12-22 DIAGNOSIS — I48.21 PERMANENT ATRIAL FIBRILLATION (HCC): Primary | ICD-10-CM

## 2020-12-22 DIAGNOSIS — I48.21 PERMANENT ATRIAL FIBRILLATION (HCC): ICD-10-CM

## 2020-12-22 PROCEDURE — 85610 PROTHROMBIN TIME: CPT

## 2020-12-22 PROCEDURE — 36416 COLLJ CAPILLARY BLOOD SPEC: CPT

## 2020-12-22 NOTE — TELEPHONE ENCOUNTER
Anticoag referral expires soon. Order pended. Please sign, thank you.       Dx: Permanent atrial fibrillation (HCC) (I48.21)  Encounter for therapeutic drug monitoring (Z51.81)  Long term (current) use of anticoagulants (Z79.01)

## 2020-12-29 ENCOUNTER — ANTI-COAG VISIT (OUTPATIENT)
Dept: INTERNAL MEDICINE CLINIC | Facility: CLINIC | Age: 70
End: 2020-12-29
Payer: MEDICARE

## 2020-12-29 DIAGNOSIS — I34.0 NON-RHEUMATIC MITRAL REGURGITATION: ICD-10-CM

## 2020-12-29 DIAGNOSIS — I25.10 CAD IN NATIVE ARTERY: ICD-10-CM

## 2020-12-29 DIAGNOSIS — Z79.01 LONG TERM (CURRENT) USE OF ANTICOAGULANTS: ICD-10-CM

## 2020-12-29 DIAGNOSIS — Z51.81 ENCOUNTER FOR THERAPEUTIC DRUG MONITORING: ICD-10-CM

## 2020-12-29 DIAGNOSIS — I48.21 PERMANENT ATRIAL FIBRILLATION (HCC): ICD-10-CM

## 2020-12-29 DIAGNOSIS — I48.20 CHRONIC ATRIAL FIBRILLATION (HCC): ICD-10-CM

## 2020-12-29 PROCEDURE — 36416 COLLJ CAPILLARY BLOOD SPEC: CPT

## 2020-12-29 PROCEDURE — 85610 PROTHROMBIN TIME: CPT

## 2021-01-19 ENCOUNTER — ANTI-COAG VISIT (OUTPATIENT)
Dept: INTERNAL MEDICINE CLINIC | Facility: CLINIC | Age: 71
End: 2021-01-19
Payer: MEDICARE

## 2021-01-19 DIAGNOSIS — I25.10 CAD IN NATIVE ARTERY: ICD-10-CM

## 2021-01-19 DIAGNOSIS — I48.21 PERMANENT ATRIAL FIBRILLATION (HCC): ICD-10-CM

## 2021-01-19 DIAGNOSIS — Z79.01 LONG TERM (CURRENT) USE OF ANTICOAGULANTS: ICD-10-CM

## 2021-01-19 DIAGNOSIS — I34.0 NON-RHEUMATIC MITRAL REGURGITATION: ICD-10-CM

## 2021-01-19 DIAGNOSIS — Z51.81 ENCOUNTER FOR THERAPEUTIC DRUG MONITORING: ICD-10-CM

## 2021-01-19 DIAGNOSIS — I48.20 CHRONIC ATRIAL FIBRILLATION (HCC): ICD-10-CM

## 2021-01-19 LAB
INR: 2.6 (ref 0.8–1.2)
TEST STRIP EXPIRATION DATE: ABNORMAL DATE

## 2021-01-19 PROCEDURE — 36416 COLLJ CAPILLARY BLOOD SPEC: CPT

## 2021-01-19 PROCEDURE — 93793 ANTICOAG MGMT PT WARFARIN: CPT

## 2021-01-19 PROCEDURE — 85610 PROTHROMBIN TIME: CPT

## 2021-02-16 ENCOUNTER — ANTI-COAG VISIT (OUTPATIENT)
Dept: INTERNAL MEDICINE CLINIC | Facility: CLINIC | Age: 71
End: 2021-02-16
Payer: MEDICARE

## 2021-02-16 DIAGNOSIS — I48.20 CHRONIC ATRIAL FIBRILLATION (HCC): ICD-10-CM

## 2021-02-16 DIAGNOSIS — I25.10 CAD IN NATIVE ARTERY: ICD-10-CM

## 2021-02-16 DIAGNOSIS — I34.0 NON-RHEUMATIC MITRAL REGURGITATION: ICD-10-CM

## 2021-02-16 DIAGNOSIS — Z51.81 ENCOUNTER FOR THERAPEUTIC DRUG MONITORING: ICD-10-CM

## 2021-02-16 DIAGNOSIS — Z79.01 LONG TERM (CURRENT) USE OF ANTICOAGULANTS: ICD-10-CM

## 2021-02-16 DIAGNOSIS — I48.21 PERMANENT ATRIAL FIBRILLATION (HCC): ICD-10-CM

## 2021-02-16 LAB
INR: 1.7 (ref 0.8–1.2)
TEST STRIP EXPIRATION DATE: ABNORMAL DATE

## 2021-02-16 PROCEDURE — 85610 PROTHROMBIN TIME: CPT

## 2021-02-16 PROCEDURE — 93793 ANTICOAG MGMT PT WARFARIN: CPT

## 2021-02-16 PROCEDURE — 36416 COLLJ CAPILLARY BLOOD SPEC: CPT

## 2021-03-13 DIAGNOSIS — Z23 NEED FOR VACCINATION: ICD-10-CM

## 2021-03-16 ENCOUNTER — ANTI-COAG VISIT (OUTPATIENT)
Dept: INTERNAL MEDICINE CLINIC | Facility: CLINIC | Age: 71
End: 2021-03-16
Payer: MEDICARE

## 2021-03-16 DIAGNOSIS — Z79.01 LONG TERM (CURRENT) USE OF ANTICOAGULANTS: ICD-10-CM

## 2021-03-16 DIAGNOSIS — I48.21 PERMANENT ATRIAL FIBRILLATION (HCC): ICD-10-CM

## 2021-03-16 DIAGNOSIS — I48.20 CHRONIC ATRIAL FIBRILLATION (HCC): ICD-10-CM

## 2021-03-16 DIAGNOSIS — I25.10 CAD IN NATIVE ARTERY: ICD-10-CM

## 2021-03-16 DIAGNOSIS — I34.0 NON-RHEUMATIC MITRAL REGURGITATION: ICD-10-CM

## 2021-03-16 DIAGNOSIS — Z51.81 ENCOUNTER FOR THERAPEUTIC DRUG MONITORING: ICD-10-CM

## 2021-03-16 LAB
INR: 1.8 (ref 0.8–1.2)
TEST STRIP EXPIRATION DATE: ABNORMAL DATE

## 2021-03-16 PROCEDURE — 36416 COLLJ CAPILLARY BLOOD SPEC: CPT

## 2021-03-16 PROCEDURE — 85610 PROTHROMBIN TIME: CPT

## 2021-03-16 PROCEDURE — 93793 ANTICOAG MGMT PT WARFARIN: CPT

## 2021-04-13 ENCOUNTER — ANTI-COAG VISIT (OUTPATIENT)
Dept: INTERNAL MEDICINE CLINIC | Facility: CLINIC | Age: 71
End: 2021-04-13
Payer: MEDICARE

## 2021-04-13 DIAGNOSIS — Z51.81 ENCOUNTER FOR THERAPEUTIC DRUG MONITORING: ICD-10-CM

## 2021-04-13 DIAGNOSIS — I48.21 PERMANENT ATRIAL FIBRILLATION (HCC): ICD-10-CM

## 2021-04-13 DIAGNOSIS — Z79.01 LONG TERM (CURRENT) USE OF ANTICOAGULANTS: ICD-10-CM

## 2021-04-13 DIAGNOSIS — I25.10 CAD IN NATIVE ARTERY: ICD-10-CM

## 2021-04-13 DIAGNOSIS — I48.20 CHRONIC ATRIAL FIBRILLATION (HCC): ICD-10-CM

## 2021-04-13 DIAGNOSIS — I34.0 NON-RHEUMATIC MITRAL REGURGITATION: ICD-10-CM

## 2021-04-13 PROCEDURE — 93793 ANTICOAG MGMT PT WARFARIN: CPT

## 2021-04-13 PROCEDURE — 36416 COLLJ CAPILLARY BLOOD SPEC: CPT

## 2021-04-13 PROCEDURE — 85610 PROTHROMBIN TIME: CPT

## 2021-05-11 ENCOUNTER — ANTI-COAG VISIT (OUTPATIENT)
Dept: INTERNAL MEDICINE CLINIC | Facility: CLINIC | Age: 71
End: 2021-05-11
Payer: MEDICARE

## 2021-05-11 DIAGNOSIS — I34.0 NON-RHEUMATIC MITRAL REGURGITATION: ICD-10-CM

## 2021-05-11 DIAGNOSIS — I25.10 CAD IN NATIVE ARTERY: ICD-10-CM

## 2021-05-11 DIAGNOSIS — Z79.01 LONG TERM (CURRENT) USE OF ANTICOAGULANTS: ICD-10-CM

## 2021-05-11 DIAGNOSIS — I48.21 PERMANENT ATRIAL FIBRILLATION (HCC): ICD-10-CM

## 2021-05-11 DIAGNOSIS — Z51.81 ENCOUNTER FOR THERAPEUTIC DRUG MONITORING: ICD-10-CM

## 2021-05-11 DIAGNOSIS — I48.20 CHRONIC ATRIAL FIBRILLATION (HCC): ICD-10-CM

## 2021-05-11 PROCEDURE — 93793 ANTICOAG MGMT PT WARFARIN: CPT

## 2021-05-11 PROCEDURE — 85610 PROTHROMBIN TIME: CPT

## 2021-06-24 ENCOUNTER — TELEPHONE (OUTPATIENT)
Dept: INTERNAL MEDICINE CLINIC | Facility: CLINIC | Age: 71
End: 2021-06-24

## 2021-06-25 ENCOUNTER — ANTI-COAG VISIT (OUTPATIENT)
Dept: INTERNAL MEDICINE CLINIC | Facility: CLINIC | Age: 71
End: 2021-06-25
Payer: MEDICARE

## 2021-06-25 DIAGNOSIS — I25.10 CAD IN NATIVE ARTERY: ICD-10-CM

## 2021-06-25 DIAGNOSIS — I34.0 NON-RHEUMATIC MITRAL REGURGITATION: ICD-10-CM

## 2021-06-25 DIAGNOSIS — I48.20 CHRONIC ATRIAL FIBRILLATION (HCC): ICD-10-CM

## 2021-06-25 DIAGNOSIS — Z79.01 LONG TERM (CURRENT) USE OF ANTICOAGULANTS: ICD-10-CM

## 2021-06-25 DIAGNOSIS — Z51.81 ENCOUNTER FOR THERAPEUTIC DRUG MONITORING: ICD-10-CM

## 2021-06-25 DIAGNOSIS — I48.21 PERMANENT ATRIAL FIBRILLATION (HCC): ICD-10-CM

## 2021-06-25 PROCEDURE — 85610 PROTHROMBIN TIME: CPT

## 2021-06-25 PROCEDURE — 93793 ANTICOAG MGMT PT WARFARIN: CPT

## 2021-07-23 ENCOUNTER — TELEPHONE (OUTPATIENT)
Dept: INTERNAL MEDICINE CLINIC | Facility: CLINIC | Age: 71
End: 2021-07-23

## 2021-07-23 NOTE — TELEPHONE ENCOUNTER
Patient called back, stated he had canceled appointment yesterday when he received confirmation call. He will call back next week to reschedule.

## 2021-07-23 NOTE — TELEPHONE ENCOUNTER
Jack Santos as he missed [facility] appointment scheduled for today at 1:15pm.     Number left to return call to [facility] to reschedule the appointment.

## 2021-09-30 ENCOUNTER — TELEPHONE (OUTPATIENT)
Dept: INTERNAL MEDICINE CLINIC | Facility: CLINIC | Age: 71
End: 2021-09-30

## 2021-10-05 ENCOUNTER — ANTI-COAG VISIT (OUTPATIENT)
Dept: INTERNAL MEDICINE CLINIC | Facility: CLINIC | Age: 71
End: 2021-10-05
Payer: MEDICARE

## 2021-10-05 ENCOUNTER — TELEPHONE (OUTPATIENT)
Dept: INTERNAL MEDICINE CLINIC | Facility: CLINIC | Age: 71
End: 2021-10-05

## 2021-10-05 DIAGNOSIS — Z51.81 ENCOUNTER FOR THERAPEUTIC DRUG MONITORING: ICD-10-CM

## 2021-10-05 DIAGNOSIS — I25.10 CAD IN NATIVE ARTERY: ICD-10-CM

## 2021-10-05 DIAGNOSIS — I48.21 PERMANENT ATRIAL FIBRILLATION (HCC): ICD-10-CM

## 2021-10-05 DIAGNOSIS — I34.0 NON-RHEUMATIC MITRAL REGURGITATION: ICD-10-CM

## 2021-10-05 DIAGNOSIS — Z79.01 LONG TERM (CURRENT) USE OF ANTICOAGULANTS: ICD-10-CM

## 2021-10-05 DIAGNOSIS — I48.20 CHRONIC ATRIAL FIBRILLATION (HCC): ICD-10-CM

## 2021-10-05 PROCEDURE — 85610 PROTHROMBIN TIME: CPT

## 2021-10-05 PROCEDURE — 93793 ANTICOAG MGMT PT WARFARIN: CPT

## 2021-10-05 NOTE — TELEPHONE ENCOUNTER
Today's INR= 2.8  Goal= 2.0-3.0    Did not change dose as instructed a few months ago- to decrease weekly dose- continued to take 5mg- four days a week and 2.5mg- three days a week. Per protocol, weekly dose decrease 5-10%.     Outside of protocol- actu

## 2021-10-19 ENCOUNTER — ANTI-COAG VISIT (OUTPATIENT)
Dept: INTERNAL MEDICINE CLINIC | Facility: CLINIC | Age: 71
End: 2021-10-19
Payer: MEDICARE

## 2021-10-19 DIAGNOSIS — I48.20 CHRONIC ATRIAL FIBRILLATION (HCC): ICD-10-CM

## 2021-10-19 DIAGNOSIS — I48.21 PERMANENT ATRIAL FIBRILLATION (HCC): ICD-10-CM

## 2021-10-19 DIAGNOSIS — Z51.81 ENCOUNTER FOR THERAPEUTIC DRUG MONITORING: ICD-10-CM

## 2021-10-19 DIAGNOSIS — I25.10 CAD IN NATIVE ARTERY: ICD-10-CM

## 2021-10-19 DIAGNOSIS — I34.0 NON-RHEUMATIC MITRAL REGURGITATION: ICD-10-CM

## 2021-10-19 DIAGNOSIS — Z79.01 LONG TERM (CURRENT) USE OF ANTICOAGULANTS: ICD-10-CM

## 2021-10-19 PROCEDURE — 85610 PROTHROMBIN TIME: CPT

## 2021-10-19 PROCEDURE — 93793 ANTICOAG MGMT PT WARFARIN: CPT

## 2021-11-09 ENCOUNTER — ANTI-COAG VISIT (OUTPATIENT)
Dept: INTERNAL MEDICINE CLINIC | Facility: CLINIC | Age: 71
End: 2021-11-09
Payer: MEDICARE

## 2021-11-09 DIAGNOSIS — I48.20 CHRONIC ATRIAL FIBRILLATION (HCC): ICD-10-CM

## 2021-11-09 DIAGNOSIS — I34.0 NON-RHEUMATIC MITRAL REGURGITATION: ICD-10-CM

## 2021-11-09 DIAGNOSIS — Z79.01 LONG TERM (CURRENT) USE OF ANTICOAGULANTS: ICD-10-CM

## 2021-11-09 DIAGNOSIS — I48.21 PERMANENT ATRIAL FIBRILLATION (HCC): ICD-10-CM

## 2021-11-09 DIAGNOSIS — I25.10 CAD IN NATIVE ARTERY: ICD-10-CM

## 2021-11-09 DIAGNOSIS — Z51.81 ENCOUNTER FOR THERAPEUTIC DRUG MONITORING: ICD-10-CM

## 2021-11-09 PROCEDURE — 85610 PROTHROMBIN TIME: CPT

## 2021-11-09 PROCEDURE — 93793 ANTICOAG MGMT PT WARFARIN: CPT

## 2021-12-03 ENCOUNTER — NURSE TRIAGE (OUTPATIENT)
Dept: INTERNAL MEDICINE CLINIC | Facility: CLINIC | Age: 71
End: 2021-12-03

## 2021-12-03 NOTE — TELEPHONE ENCOUNTER
Please reply to pool: EM RN TRIAGE    Action Requested: Summary for Provider     []  Critical Lab, Recommendations Needed  [] Need Additional Advice  []   FYI    []   Need Orders  [] Need Medications Sent to Pharmacy  []  Other     SUMMARY: Reque symptoms    Protocols used: CORONAVIRUS (COVID-19) DIAGNOSED OR [id number]

## 2021-12-04 ENCOUNTER — TELEMEDICINE (OUTPATIENT)
Dept: INTERNAL MEDICINE CLINIC | Facility: CLINIC | Age: 71
End: 2021-12-04
Payer: MEDICARE

## 2021-12-04 DIAGNOSIS — U07.1 COVID-19 VIRUS INFECTION: Primary | ICD-10-CM

## 2021-12-04 PROCEDURE — 99214 OFFICE O/P EST MOD 30 MIN: CPT | Performed by: INTERNAL MEDICINE

## 2021-12-04 NOTE — PROGRESS NOTES
Subjective:   Patient ID: Audra Melgar is a 66year old male. HPI  Patient seen today through live 2 way video visit.   Patient started having symptoms beginning of the week Monday Tuesday with fatigue body ache fever cough went and got tested for C COVID-19 virus infection  (primary encounter diagnosis) due to patient age and medical problems he is a good candidate for monoclonal antibody treatment will place order in the meantime monitor symptoms any worsening symptoms like chest pain shortness of

## 2021-12-06 ENCOUNTER — NURSE ONLY (OUTPATIENT)
Dept: INFUSION CENTER | Age: 71
End: 2021-12-06
Attending: EMERGENCY MEDICINE
Payer: MEDICARE

## 2021-12-06 VITALS
HEIGHT: 70 IN | BODY MASS INDEX: 26.48 KG/M2 | SYSTOLIC BLOOD PRESSURE: 116 MMHG | OXYGEN SATURATION: 98 % | WEIGHT: 185 LBS | DIASTOLIC BLOOD PRESSURE: 74 MMHG | HEART RATE: 80 BPM | TEMPERATURE: 99 F | RESPIRATION RATE: 18 BRPM

## 2021-12-06 RX ORDER — ACETAMINOPHEN 500 MG
1000 TABLET ORAL ONCE AS NEEDED
Status: ACTIVE | OUTPATIENT
Start: 2021-12-06 | End: 2021-12-06

## 2021-12-06 NOTE — PROGRESS NOTES
1105  Pt arrived for infusion. Education given on IV start and medication infusion. 1127  Infusion started. Pt given water. Pt resting on cart without distress. Will continue to monitor. 1147  Infusion complete. Line flushed with Normal Saline.  Sirena

## 2021-12-07 ENCOUNTER — TELEPHONE (OUTPATIENT)
Dept: INTERNAL MEDICINE CLINIC | Facility: CLINIC | Age: 71
End: 2021-12-07

## 2021-12-07 NOTE — TELEPHONE ENCOUNTER
Pt received PAB infusion at [facility] on 12/6 for COVID-19. Please follow-up with pt for post-infusion assessment and home monitoring if needed. Thank you.

## 2021-12-08 NOTE — TELEPHONE ENCOUNTER
Home Monitoring Day 5 of 7. Patient tested positive for Covid on 12/3 and had antibody infusion on 12/6. No Home monitoring order.      What  was your temp today? - 98.0    How did you take your temp?     with an oral thermometer    What was your pulse ox t

## 2021-12-13 ENCOUNTER — TELEPHONE (OUTPATIENT)
Dept: INTERNAL MEDICINE CLINIC | Facility: CLINIC | Age: 71
End: 2021-12-13

## 2021-12-13 DIAGNOSIS — Z79.01 LONG TERM (CURRENT) USE OF ANTICOAGULANTS: ICD-10-CM

## 2021-12-13 DIAGNOSIS — Z51.81 ENCOUNTER FOR THERAPEUTIC DRUG MONITORING: ICD-10-CM

## 2021-12-13 DIAGNOSIS — I48.21 PERMANENT ATRIAL FIBRILLATION (HCC): Primary | ICD-10-CM

## 2021-12-14 ENCOUNTER — ANTI-COAG VISIT (OUTPATIENT)
Dept: INTERNAL MEDICINE CLINIC | Facility: CLINIC | Age: 71
End: 2021-12-14
Payer: MEDICARE

## 2021-12-14 ENCOUNTER — TELEPHONE (OUTPATIENT)
Dept: INTERNAL MEDICINE CLINIC | Facility: CLINIC | Age: 71
End: 2021-12-14

## 2021-12-14 DIAGNOSIS — I34.0 NON-RHEUMATIC MITRAL REGURGITATION: ICD-10-CM

## 2021-12-14 DIAGNOSIS — Z51.81 ENCOUNTER FOR THERAPEUTIC DRUG MONITORING: ICD-10-CM

## 2021-12-14 DIAGNOSIS — I48.20 CHRONIC ATRIAL FIBRILLATION (HCC): ICD-10-CM

## 2021-12-14 DIAGNOSIS — I48.21 PERMANENT ATRIAL FIBRILLATION (HCC): ICD-10-CM

## 2021-12-14 DIAGNOSIS — Z79.01 LONG TERM (CURRENT) USE OF ANTICOAGULANTS: ICD-10-CM

## 2021-12-14 DIAGNOSIS — I25.10 CAD IN NATIVE ARTERY: ICD-10-CM

## 2021-12-14 PROCEDURE — 85610 PROTHROMBIN TIME: CPT

## 2021-12-14 PROCEDURE — 93793 ANTICOAG MGMT PT WARFARIN: CPT

## 2021-12-14 NOTE — TELEPHONE ENCOUNTER
Today's INR= 4.4  Goal= 2.0-3.0    Recently COVID positive. Reports having less of an appetite while he was sick. Small bruise noted to left arm- he received the covid antibody infusion on 12/6. Denies blood in the urine/stool.      Bleeding precautions rev

## 2021-12-21 ENCOUNTER — ANTI-COAG VISIT (OUTPATIENT)
Dept: INTERNAL MEDICINE CLINIC | Facility: CLINIC | Age: 71
End: 2021-12-21
Payer: MEDICARE

## 2021-12-21 DIAGNOSIS — Z79.01 LONG TERM (CURRENT) USE OF ANTICOAGULANTS: ICD-10-CM

## 2021-12-21 DIAGNOSIS — I48.20 CHRONIC ATRIAL FIBRILLATION (HCC): ICD-10-CM

## 2021-12-21 DIAGNOSIS — I34.0 NON-RHEUMATIC MITRAL REGURGITATION: ICD-10-CM

## 2021-12-21 DIAGNOSIS — Z51.81 ENCOUNTER FOR THERAPEUTIC DRUG MONITORING: ICD-10-CM

## 2021-12-21 DIAGNOSIS — I25.10 CAD IN NATIVE ARTERY: ICD-10-CM

## 2021-12-21 DIAGNOSIS — I48.21 PERMANENT ATRIAL FIBRILLATION (HCC): ICD-10-CM

## 2021-12-21 PROCEDURE — 93793 ANTICOAG MGMT PT WARFARIN: CPT

## 2021-12-21 PROCEDURE — 85610 PROTHROMBIN TIME: CPT

## 2022-01-11 ENCOUNTER — ANTI-COAG VISIT (OUTPATIENT)
Dept: INTERNAL MEDICINE CLINIC | Facility: CLINIC | Age: 72
End: 2022-01-11
Payer: MEDICARE

## 2022-01-11 DIAGNOSIS — I48.21 PERMANENT ATRIAL FIBRILLATION (HCC): ICD-10-CM

## 2022-01-11 DIAGNOSIS — Z51.81 ENCOUNTER FOR THERAPEUTIC DRUG MONITORING: ICD-10-CM

## 2022-01-11 DIAGNOSIS — I48.20 CHRONIC ATRIAL FIBRILLATION (HCC): ICD-10-CM

## 2022-01-11 DIAGNOSIS — I25.10 CAD IN NATIVE ARTERY: ICD-10-CM

## 2022-01-11 DIAGNOSIS — I34.0 NON-RHEUMATIC MITRAL REGURGITATION: ICD-10-CM

## 2022-01-11 DIAGNOSIS — Z79.01 LONG TERM (CURRENT) USE OF ANTICOAGULANTS: ICD-10-CM

## 2022-01-11 LAB — INR: 2.3 (ref 0.8–1.2)

## 2022-01-11 PROCEDURE — 93793 ANTICOAG MGMT PT WARFARIN: CPT

## 2022-01-11 PROCEDURE — 85610 PROTHROMBIN TIME: CPT

## 2022-02-08 ENCOUNTER — ANTI-COAG VISIT (OUTPATIENT)
Dept: INTERNAL MEDICINE CLINIC | Facility: CLINIC | Age: 72
End: 2022-02-08
Payer: MEDICARE

## 2022-02-08 DIAGNOSIS — I34.0 NON-RHEUMATIC MITRAL REGURGITATION: ICD-10-CM

## 2022-02-08 DIAGNOSIS — I25.10 CAD IN NATIVE ARTERY: ICD-10-CM

## 2022-02-08 DIAGNOSIS — Z79.01 LONG TERM (CURRENT) USE OF ANTICOAGULANTS: ICD-10-CM

## 2022-02-08 DIAGNOSIS — Z51.81 ENCOUNTER FOR THERAPEUTIC DRUG MONITORING: ICD-10-CM

## 2022-02-08 DIAGNOSIS — I48.20 CHRONIC ATRIAL FIBRILLATION (HCC): ICD-10-CM

## 2022-02-08 DIAGNOSIS — I48.21 PERMANENT ATRIAL FIBRILLATION (HCC): ICD-10-CM

## 2022-02-08 LAB — INR: 3.5 (ref 0.8–1.2)

## 2022-02-08 PROCEDURE — 93793 ANTICOAG MGMT PT WARFARIN: CPT

## 2022-02-08 PROCEDURE — 85610 PROTHROMBIN TIME: CPT

## 2022-03-08 ENCOUNTER — ANTI-COAG VISIT (OUTPATIENT)
Dept: INTERNAL MEDICINE CLINIC | Facility: CLINIC | Age: 72
End: 2022-03-08
Payer: MEDICARE

## 2022-03-08 DIAGNOSIS — I34.0 NON-RHEUMATIC MITRAL REGURGITATION: ICD-10-CM

## 2022-03-08 DIAGNOSIS — Z79.01 LONG TERM (CURRENT) USE OF ANTICOAGULANTS: ICD-10-CM

## 2022-03-08 DIAGNOSIS — I48.20 CHRONIC ATRIAL FIBRILLATION (HCC): ICD-10-CM

## 2022-03-08 DIAGNOSIS — I48.21 PERMANENT ATRIAL FIBRILLATION (HCC): ICD-10-CM

## 2022-03-08 DIAGNOSIS — Z51.81 ENCOUNTER FOR THERAPEUTIC DRUG MONITORING: ICD-10-CM

## 2022-03-08 DIAGNOSIS — I25.10 CAD IN NATIVE ARTERY: ICD-10-CM

## 2022-03-08 LAB
INR: 1.9 (ref 0.8–1.2)
TEST STRIP EXPIRATION DATE: ABNORMAL DATE

## 2022-03-08 PROCEDURE — 93793 ANTICOAG MGMT PT WARFARIN: CPT

## 2022-03-08 PROCEDURE — 85610 PROTHROMBIN TIME: CPT

## 2022-03-09 ENCOUNTER — OFFICE VISIT (OUTPATIENT)
Dept: INTERNAL MEDICINE CLINIC | Facility: CLINIC | Age: 72
End: 2022-03-09
Payer: MEDICARE

## 2022-03-09 VITALS
HEART RATE: 97 BPM | HEIGHT: 70 IN | WEIGHT: 191.38 LBS | BODY MASS INDEX: 27.4 KG/M2 | DIASTOLIC BLOOD PRESSURE: 68 MMHG | SYSTOLIC BLOOD PRESSURE: 134 MMHG

## 2022-03-09 DIAGNOSIS — I47.1 SVT (SUPRAVENTRICULAR TACHYCARDIA) (HCC): ICD-10-CM

## 2022-03-09 DIAGNOSIS — I48.20 CHRONIC ATRIAL FIBRILLATION (HCC): ICD-10-CM

## 2022-03-09 DIAGNOSIS — E78.5 DYSLIPIDEMIA: ICD-10-CM

## 2022-03-09 DIAGNOSIS — Z00.00 ENCOUNTER FOR ANNUAL HEALTH EXAMINATION: ICD-10-CM

## 2022-03-09 DIAGNOSIS — I70.0 AORTIC ATHEROSCLEROSIS (HCC): ICD-10-CM

## 2022-03-09 DIAGNOSIS — Z12.5 PROSTATE CANCER SCREENING: ICD-10-CM

## 2022-03-09 DIAGNOSIS — I34.0 MITRAL VALVE INSUFFICIENCY, UNSPECIFIED ETIOLOGY: ICD-10-CM

## 2022-03-09 DIAGNOSIS — Z00.00 ANNUAL PHYSICAL EXAM: Primary | ICD-10-CM

## 2022-03-09 DIAGNOSIS — I25.10 ASHD (ARTERIOSCLEROTIC HEART DISEASE): ICD-10-CM

## 2022-03-09 PROCEDURE — 99213 OFFICE O/P EST LOW 20 MIN: CPT | Performed by: INTERNAL MEDICINE

## 2022-03-09 PROCEDURE — G0439 PPPS, SUBSEQ VISIT: HCPCS | Performed by: INTERNAL MEDICINE

## 2022-04-04 ENCOUNTER — LAB ENCOUNTER (OUTPATIENT)
Dept: LAB | Facility: HOSPITAL | Age: 72
End: 2022-04-04
Attending: INTERNAL MEDICINE
Payer: MEDICARE

## 2022-04-04 DIAGNOSIS — Z00.00 ANNUAL PHYSICAL EXAM: ICD-10-CM

## 2022-04-04 DIAGNOSIS — E78.5 DYSLIPIDEMIA: ICD-10-CM

## 2022-04-04 DIAGNOSIS — Z12.5 PROSTATE CANCER SCREENING: ICD-10-CM

## 2022-04-04 DIAGNOSIS — I25.10 ASHD (ARTERIOSCLEROTIC HEART DISEASE): ICD-10-CM

## 2022-04-04 LAB
ALBUMIN SERPL-MCNC: 3.7 G/DL (ref 3.4–5)
ALBUMIN/GLOB SERPL: 0.9 {RATIO} (ref 1–2)
ALP LIVER SERPL-CCNC: 123 U/L
ALT SERPL-CCNC: 28 U/L
ANION GAP SERPL CALC-SCNC: 4 MMOL/L (ref 0–18)
AST SERPL-CCNC: 18 U/L (ref 15–37)
BILIRUB SERPL-MCNC: 0.7 MG/DL (ref 0.1–2)
BUN BLD-MCNC: 15 MG/DL (ref 7–18)
BUN/CREAT SERPL: 15.6 (ref 10–20)
CALCIUM BLD-MCNC: 9.4 MG/DL (ref 8.5–10.1)
CHLORIDE SERPL-SCNC: 103 MMOL/L (ref 98–112)
CHOLEST SERPL-MCNC: 149 MG/DL (ref ?–200)
CO2 SERPL-SCNC: 31 MMOL/L (ref 21–32)
COMPLEXED PSA SERPL-MCNC: 6.52 NG/ML (ref ?–4)
CREAT BLD-MCNC: 0.96 MG/DL
DEPRECATED RDW RBC AUTO: 44.2 FL (ref 35.1–46.3)
ERYTHROCYTE [DISTWIDTH] IN BLOOD BY AUTOMATED COUNT: 12.4 % (ref 11–15)
FASTING PATIENT LIPID ANSWER: YES
FASTING STATUS PATIENT QL REPORTED: YES
GLOBULIN PLAS-MCNC: 3.9 G/DL (ref 2.8–4.4)
GLUCOSE BLD-MCNC: 96 MG/DL (ref 70–99)
HCT VFR BLD AUTO: 50.3 %
HDLC SERPL-MCNC: 60 MG/DL (ref 40–59)
HGB BLD-MCNC: 16.4 G/DL
LDLC SERPL CALC-MCNC: 77 MG/DL (ref ?–100)
MCH RBC QN AUTO: 31.2 PG (ref 26–34)
MCHC RBC AUTO-ENTMCNC: 32.6 G/DL (ref 31–37)
MCV RBC AUTO: 95.6 FL
NONHDLC SERPL-MCNC: 89 MG/DL (ref ?–130)
OSMOLALITY SERPL CALC.SUM OF ELEC: 287 MOSM/KG (ref 275–295)
PLATELET # BLD AUTO: 237 10(3)UL (ref 150–450)
POTASSIUM SERPL-SCNC: 5 MMOL/L (ref 3.5–5.1)
PROT SERPL-MCNC: 7.6 G/DL (ref 6.4–8.2)
RBC # BLD AUTO: 5.26 X10(6)UL
SODIUM SERPL-SCNC: 138 MMOL/L (ref 136–145)
TRIGL SERPL-MCNC: 58 MG/DL (ref 30–149)
VLDLC SERPL CALC-MCNC: 9 MG/DL (ref 0–30)
WBC # BLD AUTO: 6.7 X10(3) UL (ref 4–11)

## 2022-04-04 PROCEDURE — 36415 COLL VENOUS BLD VENIPUNCTURE: CPT

## 2022-04-04 PROCEDURE — 80053 COMPREHEN METABOLIC PANEL: CPT

## 2022-04-04 PROCEDURE — 85027 COMPLETE CBC AUTOMATED: CPT

## 2022-04-04 PROCEDURE — 80061 LIPID PANEL: CPT

## 2022-04-05 ENCOUNTER — ANTI-COAG VISIT (OUTPATIENT)
Dept: INTERNAL MEDICINE CLINIC | Facility: CLINIC | Age: 72
End: 2022-04-05
Payer: MEDICARE

## 2022-04-05 DIAGNOSIS — I25.10 CAD IN NATIVE ARTERY: ICD-10-CM

## 2022-04-05 DIAGNOSIS — Z51.81 ENCOUNTER FOR THERAPEUTIC DRUG MONITORING: ICD-10-CM

## 2022-04-05 DIAGNOSIS — I34.0 NON-RHEUMATIC MITRAL REGURGITATION: ICD-10-CM

## 2022-04-05 DIAGNOSIS — Z79.01 LONG TERM (CURRENT) USE OF ANTICOAGULANTS: ICD-10-CM

## 2022-04-05 DIAGNOSIS — I48.21 PERMANENT ATRIAL FIBRILLATION (HCC): ICD-10-CM

## 2022-04-05 DIAGNOSIS — I48.20 CHRONIC ATRIAL FIBRILLATION (HCC): ICD-10-CM

## 2022-04-05 LAB
INR: 1.8 (ref 0.8–1.2)
TEST STRIP EXPIRATION DATE: ABNORMAL DATE

## 2022-04-05 PROCEDURE — 93793 ANTICOAG MGMT PT WARFARIN: CPT

## 2022-04-05 PROCEDURE — 85610 PROTHROMBIN TIME: CPT

## 2022-05-03 ENCOUNTER — ANTI-COAG VISIT (OUTPATIENT)
Dept: INTERNAL MEDICINE CLINIC | Facility: CLINIC | Age: 72
End: 2022-05-03
Payer: MEDICARE

## 2022-05-03 DIAGNOSIS — I48.21 PERMANENT ATRIAL FIBRILLATION (HCC): ICD-10-CM

## 2022-05-03 DIAGNOSIS — Z51.81 ENCOUNTER FOR THERAPEUTIC DRUG MONITORING: ICD-10-CM

## 2022-05-03 DIAGNOSIS — I34.0 NON-RHEUMATIC MITRAL REGURGITATION: ICD-10-CM

## 2022-05-03 DIAGNOSIS — I48.20 CHRONIC ATRIAL FIBRILLATION (HCC): ICD-10-CM

## 2022-05-03 DIAGNOSIS — I25.10 CAD IN NATIVE ARTERY: ICD-10-CM

## 2022-05-03 DIAGNOSIS — Z79.01 LONG TERM (CURRENT) USE OF ANTICOAGULANTS: ICD-10-CM

## 2022-05-03 LAB
INR: 1.7 (ref 0.8–1.2)
TEST STRIP EXPIRATION DATE: ABNORMAL DATE

## 2022-05-03 PROCEDURE — 93793 ANTICOAG MGMT PT WARFARIN: CPT

## 2022-05-03 PROCEDURE — 85610 PROTHROMBIN TIME: CPT

## 2022-05-31 ENCOUNTER — ANTI-COAG VISIT (OUTPATIENT)
Dept: ANTICOAGULATION | Facility: CLINIC | Age: 72
End: 2022-05-31
Payer: MEDICARE

## 2022-05-31 DIAGNOSIS — Z51.81 ENCOUNTER FOR THERAPEUTIC DRUG MONITORING: ICD-10-CM

## 2022-05-31 DIAGNOSIS — I25.10 CAD IN NATIVE ARTERY: ICD-10-CM

## 2022-05-31 DIAGNOSIS — I48.20 CHRONIC ATRIAL FIBRILLATION (HCC): ICD-10-CM

## 2022-05-31 DIAGNOSIS — Z79.01 LONG TERM (CURRENT) USE OF ANTICOAGULANTS: ICD-10-CM

## 2022-05-31 DIAGNOSIS — I34.0 NON-RHEUMATIC MITRAL REGURGITATION: ICD-10-CM

## 2022-05-31 DIAGNOSIS — I48.21 PERMANENT ATRIAL FIBRILLATION (HCC): ICD-10-CM

## 2022-05-31 LAB
INR: 2.1 (ref 0.8–1.2)
TEST STRIP EXPIRATION DATE: ABNORMAL DATE

## 2022-05-31 PROCEDURE — 93793 ANTICOAG MGMT PT WARFARIN: CPT | Performed by: INTERNAL MEDICINE

## 2022-05-31 PROCEDURE — 85610 PROTHROMBIN TIME: CPT | Performed by: INTERNAL MEDICINE

## 2022-06-17 RX ORDER — WARFARIN SODIUM 5 MG/1
5 TABLET ORAL NIGHTLY
Qty: 90 TABLET | Refills: 3 | Status: SHIPPED | OUTPATIENT
Start: 2022-06-17

## 2022-06-28 ENCOUNTER — ANTI-COAG VISIT (OUTPATIENT)
Dept: ANTICOAGULATION | Facility: CLINIC | Age: 72
End: 2022-06-28
Payer: MEDICARE

## 2022-06-28 DIAGNOSIS — I48.20 CHRONIC ATRIAL FIBRILLATION (HCC): ICD-10-CM

## 2022-06-28 DIAGNOSIS — Z51.81 ENCOUNTER FOR THERAPEUTIC DRUG MONITORING: ICD-10-CM

## 2022-06-28 DIAGNOSIS — I25.10 CAD IN NATIVE ARTERY: ICD-10-CM

## 2022-06-28 DIAGNOSIS — I48.21 PERMANENT ATRIAL FIBRILLATION (HCC): ICD-10-CM

## 2022-06-28 DIAGNOSIS — Z79.01 LONG TERM (CURRENT) USE OF ANTICOAGULANTS: ICD-10-CM

## 2022-06-28 DIAGNOSIS — I34.0 NON-RHEUMATIC MITRAL REGURGITATION: ICD-10-CM

## 2022-06-28 LAB
INR: 4.2 (ref 0.8–1.2)
TEST STRIP EXPIRATION DATE: ABNORMAL DATE

## 2022-06-28 PROCEDURE — 93793 ANTICOAG MGMT PT WARFARIN: CPT | Performed by: INTERNAL MEDICINE

## 2022-06-28 PROCEDURE — 85610 PROTHROMBIN TIME: CPT | Performed by: INTERNAL MEDICINE

## 2022-07-05 ENCOUNTER — ANTI-COAG VISIT (OUTPATIENT)
Dept: ANTICOAGULATION | Facility: CLINIC | Age: 72
End: 2022-07-05
Payer: MEDICARE

## 2022-07-05 DIAGNOSIS — I34.0 NON-RHEUMATIC MITRAL REGURGITATION: ICD-10-CM

## 2022-07-05 DIAGNOSIS — Z51.81 ENCOUNTER FOR THERAPEUTIC DRUG MONITORING: ICD-10-CM

## 2022-07-05 DIAGNOSIS — I25.10 CAD IN NATIVE ARTERY: ICD-10-CM

## 2022-07-05 DIAGNOSIS — I48.20 CHRONIC ATRIAL FIBRILLATION (HCC): ICD-10-CM

## 2022-07-05 DIAGNOSIS — Z79.01 LONG TERM (CURRENT) USE OF ANTICOAGULANTS: ICD-10-CM

## 2022-07-05 DIAGNOSIS — I48.21 PERMANENT ATRIAL FIBRILLATION (HCC): ICD-10-CM

## 2022-07-05 LAB
INR: 2.6 (ref 0.8–1.2)
TEST STRIP EXPIRATION DATE: ABNORMAL DATE

## 2022-07-05 PROCEDURE — 93793 ANTICOAG MGMT PT WARFARIN: CPT | Performed by: INTERNAL MEDICINE

## 2022-07-05 PROCEDURE — 85610 PROTHROMBIN TIME: CPT | Performed by: INTERNAL MEDICINE

## 2022-07-26 ENCOUNTER — ANTI-COAG VISIT (OUTPATIENT)
Dept: ANTICOAGULATION | Facility: CLINIC | Age: 72
End: 2022-07-26
Payer: MEDICARE

## 2022-07-26 DIAGNOSIS — Z51.81 ENCOUNTER FOR THERAPEUTIC DRUG MONITORING: ICD-10-CM

## 2022-07-26 DIAGNOSIS — I48.20 CHRONIC ATRIAL FIBRILLATION (HCC): ICD-10-CM

## 2022-07-26 DIAGNOSIS — Z79.01 LONG TERM CURRENT USE OF ANTICOAGULANT THERAPY: ICD-10-CM

## 2022-07-26 DIAGNOSIS — I34.0 NON-RHEUMATIC MITRAL REGURGITATION: ICD-10-CM

## 2022-07-26 DIAGNOSIS — I25.10 CAD IN NATIVE ARTERY: ICD-10-CM

## 2022-07-26 DIAGNOSIS — I48.21 PERMANENT ATRIAL FIBRILLATION (HCC): ICD-10-CM

## 2022-07-26 LAB
INR: 2.4 (ref 0.8–1.2)
TEST STRIP EXPIRATION DATE: ABNORMAL DATE

## 2022-07-26 PROCEDURE — 93793 ANTICOAG MGMT PT WARFARIN: CPT | Performed by: INTERNAL MEDICINE

## 2022-07-26 PROCEDURE — 85610 PROTHROMBIN TIME: CPT | Performed by: INTERNAL MEDICINE

## 2022-08-23 ENCOUNTER — ANTI-COAG VISIT (OUTPATIENT)
Dept: ANTICOAGULATION | Facility: CLINIC | Age: 72
End: 2022-08-23
Payer: MEDICARE

## 2022-08-23 ENCOUNTER — TELEPHONE (OUTPATIENT)
Dept: ANTICOAGULATION | Facility: CLINIC | Age: 72
End: 2022-08-23

## 2022-08-23 DIAGNOSIS — Z79.01 LONG TERM CURRENT USE OF ANTICOAGULANT THERAPY: ICD-10-CM

## 2022-08-23 DIAGNOSIS — Z51.81 ENCOUNTER FOR THERAPEUTIC DRUG MONITORING: ICD-10-CM

## 2022-08-23 DIAGNOSIS — I48.20 CHRONIC ATRIAL FIBRILLATION (HCC): ICD-10-CM

## 2022-08-23 DIAGNOSIS — I48.21 PERMANENT ATRIAL FIBRILLATION (HCC): ICD-10-CM

## 2022-08-23 DIAGNOSIS — Z79.01 LONG TERM (CURRENT) USE OF ANTICOAGULANTS: ICD-10-CM

## 2022-08-23 DIAGNOSIS — I34.0 NON-RHEUMATIC MITRAL REGURGITATION: ICD-10-CM

## 2022-08-23 DIAGNOSIS — I25.10 CAD IN NATIVE ARTERY: ICD-10-CM

## 2022-08-23 LAB
INR: 5.4 (ref 0.8–1.2)
TEST STRIP EXPIRATION DATE: ABNORMAL DATE

## 2022-08-23 PROCEDURE — 93793 ANTICOAG MGMT PT WARFARIN: CPT | Performed by: INTERNAL MEDICINE

## 2022-08-23 PROCEDURE — 85610 PROTHROMBIN TIME: CPT | Performed by: INTERNAL MEDICINE

## 2022-08-23 NOTE — TELEPHONE ENCOUNTER
Today's INR= 5.4  Goal= 2.0-3.0    Reports that for back pain, he started a medrol dose pack on Sat. 8/20, last dose will be Wed. 8/24. Advised that when taken with warfarin, may increase INR/thin the blood (per drugs. com). Encouraged to increase greens/veggies/vit K foods this week. Denies any unusual bleeding/bruising. Advised to contact PCP for any changes and ED evaluation if he was to fall/or bump his head. Bleeding precautions reviewed. Per protocol, hold 2-3 doses of warfarin and decrease weekly dose 10-20%. Outside of protocol- as there is a known cause for elevated INR- no change made to weekly dose. Instructed to hold 2 doses of warfarin Tues/Wed, take a partial dose on Thurs, then resume current dose and recheck INR in 3-5 days- will return next Tuesday.

## 2022-08-30 ENCOUNTER — ANTI-COAG VISIT (OUTPATIENT)
Dept: ANTICOAGULATION | Facility: CLINIC | Age: 72
End: 2022-08-30
Payer: MEDICARE

## 2022-08-30 DIAGNOSIS — Z79.01 LONG TERM CURRENT USE OF ANTICOAGULANT THERAPY: ICD-10-CM

## 2022-08-30 DIAGNOSIS — I48.20 CHRONIC ATRIAL FIBRILLATION (HCC): ICD-10-CM

## 2022-08-30 DIAGNOSIS — I48.21 PERMANENT ATRIAL FIBRILLATION (HCC): ICD-10-CM

## 2022-08-30 DIAGNOSIS — Z79.01 LONG TERM (CURRENT) USE OF ANTICOAGULANTS: ICD-10-CM

## 2022-08-30 DIAGNOSIS — I34.0 NON-RHEUMATIC MITRAL REGURGITATION: ICD-10-CM

## 2022-08-30 DIAGNOSIS — Z51.81 ENCOUNTER FOR THERAPEUTIC DRUG MONITORING: ICD-10-CM

## 2022-08-30 DIAGNOSIS — I25.10 CAD IN NATIVE ARTERY: ICD-10-CM

## 2022-08-30 LAB
INR: 1.5 (ref 0.8–1.2)
TEST STRIP EXPIRATION DATE: ABNORMAL DATE

## 2022-08-30 PROCEDURE — 93793 ANTICOAG MGMT PT WARFARIN: CPT | Performed by: INTERNAL MEDICINE

## 2022-08-30 PROCEDURE — 85610 PROTHROMBIN TIME: CPT | Performed by: INTERNAL MEDICINE

## 2022-09-01 ENCOUNTER — LAB ENCOUNTER (OUTPATIENT)
Dept: LAB | Facility: HOSPITAL | Age: 72
End: 2022-09-01
Attending: INTERNAL MEDICINE
Payer: MEDICARE

## 2022-09-01 DIAGNOSIS — R97.20 ABNORMAL PSA: ICD-10-CM

## 2022-09-01 LAB — PSA SERPL-MCNC: 8.55 NG/ML (ref ?–4)

## 2022-09-01 PROCEDURE — 84153 ASSAY OF PSA TOTAL: CPT

## 2022-09-01 PROCEDURE — 36415 COLL VENOUS BLD VENIPUNCTURE: CPT

## 2022-09-09 ENCOUNTER — TELEPHONE (OUTPATIENT)
Dept: INTERNAL MEDICINE CLINIC | Facility: CLINIC | Age: 72
End: 2022-09-09

## 2022-09-09 DIAGNOSIS — R97.20 ELEVATED PSA: Primary | ICD-10-CM

## 2022-09-09 NOTE — TELEPHONE ENCOUNTER
Patient is calling to advise PCP he needs a new referral to see a Urologist.   Patient notes the one provided by PCP before, Dr. Nina Mcfarlane, is unable to schedule him in until October 20, 2022 and the following provider is able to see him this month. Dr. Deanna Rogers  Urologist  [address] [place] [address]  Phone     Reason: PSA elevated    Appt:   9/13/22    Patient is requesting referral.       hospitals informed patient the turnaround time for referral is 5-7 business days. Patient was informed to check their [employer] account for referral status.

## 2022-09-13 ENCOUNTER — MED REC SCAN ONLY (OUTPATIENT)
Dept: INTERNAL MEDICINE CLINIC | Facility: CLINIC | Age: 72
End: 2022-09-13

## 2022-09-13 NOTE — TELEPHONE ENCOUNTER
Urology referral signed, though not sure why one is needed as he has Medicare primary and PPO secondary as insurance

## 2022-09-14 ENCOUNTER — ANTI-COAG VISIT (OUTPATIENT)
Dept: ANTICOAGULATION | Facility: CLINIC | Age: 72
End: 2022-09-14
Payer: MEDICARE

## 2022-09-14 DIAGNOSIS — I48.21 PERMANENT ATRIAL FIBRILLATION (HCC): ICD-10-CM

## 2022-09-14 DIAGNOSIS — I34.0 NON-RHEUMATIC MITRAL REGURGITATION: ICD-10-CM

## 2022-09-14 DIAGNOSIS — Z51.81 ENCOUNTER FOR THERAPEUTIC DRUG MONITORING: ICD-10-CM

## 2022-09-14 DIAGNOSIS — Z79.01 LONG TERM CURRENT USE OF ANTICOAGULANT THERAPY: ICD-10-CM

## 2022-09-14 DIAGNOSIS — I48.20 CHRONIC ATRIAL FIBRILLATION (HCC): ICD-10-CM

## 2022-09-14 DIAGNOSIS — Z79.01 LONG TERM (CURRENT) USE OF ANTICOAGULANTS: ICD-10-CM

## 2022-09-14 DIAGNOSIS — I25.10 CAD IN NATIVE ARTERY: ICD-10-CM

## 2022-09-14 LAB
INR: 2.6 (ref 0.8–1.2)
TEST STRIP EXPIRATION DATE: ABNORMAL DATE

## 2022-09-14 PROCEDURE — 85610 PROTHROMBIN TIME: CPT | Performed by: INTERNAL MEDICINE

## 2022-09-14 PROCEDURE — 93793 ANTICOAG MGMT PT WARFARIN: CPT | Performed by: INTERNAL MEDICINE

## 2022-09-14 NOTE — PROGRESS NOTES
Anesthesia Pre Eval Note    Anesthesia ROS/Med Hx        Anesthetic Complication History:  Patient does not have a history of anesthetic complications      Pulmonary Review:    Positive for sleep apnea     Neuro/Psych Review:    Positive for psychiatric history    Cardiovascular Review:    Positive for hypertension    GI/HEPATIC/RENAL Review:    Positive for renal disease    End/Other Review:    Positive for hypothyroidism  Additional Results:     ALLERGIES:   -- Gadolinium Derivatives -- Palpitations   -- Dust -- Other (See Comments)    --  sneezing, chest congestion   -- No Name Available -- Other (See Comments)    --   IV dye - passed out ( doubt this to be allergy or             side effect)   -- Pollen -- Other (See Comments)    --  sneezing, watery eyes       Last Labs        Component                Value               Date/Time                  WBC                      5.6                 05/12/2022 0921            RBC                      5.27                05/12/2022 0921            HGB                      15.5                05/12/2022 0921            HCT                      49.5                05/12/2022 0921            MCV                      93.9                05/12/2022 0921            MCH                      29.4                05/12/2022 0921            MCHC                     31.3 (L)            05/12/2022 0921            RDW-CV                   12.6                05/12/2022 0921            Sodium                   138                 05/12/2022 0921            Potassium                4.8                 05/12/2022 0921            Chloride                 103                 05/12/2022 0921            Carbon Dioxide           27                  05/12/2022 0921            Glucose                  117 (H)             05/12/2022 0921            BUN                      23 (H)              05/12/2022 0921            Creatinine               1.20 (H)            05/12/2022 0921             [facility]    Progress Note    Katharina Ellison Patient Status:  Inpatient    1950 MRN [id number]   Location [facility] 2W/SW Attending Sae Alegria MD   Hosp Day # 1 PCP Jayden Chacon MD       Subjective:   Anita Narayan Glomerular Filtrati*     61                  05/12/2022 0921            Calcium                  9.0                 05/12/2022 0921            PLT                      296                 05/12/2022 0921        Past Medical History:  No date: Chronic renal insufficiency      Comment:  STAGE 3  05/05/2022: COVID-19      Comment:  sneezing headache, exhaustion  No date: Depression  No date: Gastroesophageal reflux disease  No date: Hypothyroid  No date: Parkinsons (CMS/[facility])  No date: Prediabetes  2017: Prostate cancer (CMS/[facility])      Comment:   PROSTATECTOMY NO CHEMO/RADIATION  No date: Sleep apnea      Comment:  USES cpap    Past Surgical History:  No date: Joint replacement; Left      Comment:  HIP  No date: Prostatectomy  No date: Tonsillectomy       Prior to Admission medications :  Medication linaclotide (Linzess) 145 MCG capsule, Sig Take by mouth daily (before breakfast)., Start Date , End Date , Taking? Yes, Authorizing Provider Outside Provider    Medication fluticasone (FLONASE) 50 MCG/ACT nasal spray, Sig Spray 1 spray in each nostril daily for 7 days.  Patient taking differently: Spray 1 spray in each nostril daily as needed., Start Date 5/6/22, End Date 9/6/22, Taking? Yes, Authorizing Provider Deepika Kong MD    Medication albuterol 108 (90 Base) MCG/ACT inhaler, Sig Inhale 2 puffs into the lungs every 4 hours as needed for Shortness of Breath or Wheezing., Start Date 5/6/22, End Date 9/6/22, Taking? Yes, Authorizing Provider Deepika Kong MD    Medication omeprazole (PrilOSEC) 20 MG capsule, Sig Take 20 mg by mouth as needed., Start Date , End Date , Taking? Yes, Authorizing Provider Outside Provider    Medication mirtazapine (REMERON) 15 MG tablet, Sig Take 15 mg by mouth nightly., Start Date , End Date , Taking? Yes, Authorizing Provider Outside Provider    Medication DOCUSATE SODIUM PO, Sig Take 300 mg by mouth every morning., Start Date , End Date , Taking? Yes, Authorizing  TSH 1.54 04/24/2018    DDIMER 0.99 (H) 04/22/2018    MG 2.1 12/25/2019    TROP 0.00 04/22/2018    B12 338 04/24/2018       Ct Abdomen Pelvis Iv Contrast, No Oral (er)    Result Date: 12/24/2019  CONCLUSION:  1.  Large right rectus sheath hematoma with areas Provider Outside Provider    Medication venlafaxine XR (EFFEXOR XR) 150 MG 24 hr capsule, Sig Take 150 mg by mouth every morning. Take 150 mg and 37.5 mg for a total of 187.5 mg daily , Start Date , End Date , Taking? Yes, Authorizing Provider Outside Provider    Medication venlafaxine (EFFEXOR) 37.5 MG tablet, Sig Take 37.5 mg by mouth every morning. Takes a total of 187.5mg , Start Date , End Date , Taking? Yes, Authorizing Provider Outside Provider    Medication melatonin 3 MG, Sig Take 10 mg by mouth nightly. STOP NOW, Start Date , End Date , Taking? Yes, Authorizing Provider Outside Provider    Medication levothyroxine (SYNTHROID, LEVOTHROID) 75 MCG tablet, Sig Take 75 mcg by mouth every morning. on an empty stomach, Start Date , End Date , Taking? Yes, Authorizing Provider Outside Provider    Medication carbidopa-levodopa (SINEMET)  MG per tablet, Sig Take 2 tablets by mouth 4 times daily. , Start Date , End Date , Taking? Yes, Authorizing Provider Outside Provider    Medication cholecalciferol (VITAMIN D3) 1000 UNITS tablet, Sig Take 1,000 Units by mouth daily. 2 TABS, Start Date , End Date , Taking? Yes, Authorizing Provider Outside Provider    Medication Multiple Vitamins-Minerals (PRESERVISION AREDS PO), Sig Stop now, Start Date , End Date , Taking? , Authorizing Provider Outside Provider    Medication ASPIRIN PO, Sig Take by mouth daily. Will get stop instructions, Start Date , End Date , Taking? , Authorizing Provider Outside Provider         Patient Vitals in the past 24 hrs:      Relevant Problems   No relevant active problems       Anes Physical Exam    Anes Plan

## 2022-10-10 ENCOUNTER — HOSPITAL ENCOUNTER (OUTPATIENT)
Dept: CT IMAGING | Facility: HOSPITAL | Age: 72
Discharge: HOME | End: 2022-10-10
Attending: UROLOGY
Payer: MEDICARE

## 2022-10-10 DIAGNOSIS — R31.29 HEMATURIA, MICROSCOPIC: ICD-10-CM

## 2022-10-10 LAB
CREAT BLD-MCNC: 0.9 MG/DL
GFR SERPLBLD BASED ON 1.73 SQ M-ARVRAT: 90 ML/MIN/1.73M2 (ref 60–?)

## 2022-10-10 PROCEDURE — 74178 CT ABD&PLV WO CNTR FLWD CNTR: CPT | Performed by: UROLOGY

## 2022-10-10 PROCEDURE — 76377 3D RENDER W/INTRP POSTPROCES: CPT | Performed by: UROLOGY

## 2022-10-10 PROCEDURE — 82565 ASSAY OF CREATININE: CPT

## 2022-10-11 ENCOUNTER — ANTI-COAG VISIT (OUTPATIENT)
Dept: ANTICOAGULATION | Facility: CLINIC | Age: 72
End: 2022-10-11
Payer: MEDICARE

## 2022-10-11 ENCOUNTER — TELEPHONE (OUTPATIENT)
Dept: ANTICOAGULATION | Facility: CLINIC | Age: 72
End: 2022-10-11

## 2022-10-11 DIAGNOSIS — Z79.01 LONG TERM (CURRENT) USE OF ANTICOAGULANTS: ICD-10-CM

## 2022-10-11 DIAGNOSIS — Z51.81 ENCOUNTER FOR THERAPEUTIC DRUG MONITORING: ICD-10-CM

## 2022-10-11 DIAGNOSIS — I25.10 CAD IN NATIVE ARTERY: ICD-10-CM

## 2022-10-11 DIAGNOSIS — I34.0 NON-RHEUMATIC MITRAL REGURGITATION: ICD-10-CM

## 2022-10-11 DIAGNOSIS — I48.21 PERMANENT ATRIAL FIBRILLATION (HCC): ICD-10-CM

## 2022-10-11 DIAGNOSIS — Z79.01 LONG TERM CURRENT USE OF ANTICOAGULANT THERAPY: ICD-10-CM

## 2022-10-11 DIAGNOSIS — I48.20 CHRONIC ATRIAL FIBRILLATION (HCC): ICD-10-CM

## 2022-10-11 LAB — INR: 3 (ref 0.8–1.2)

## 2022-10-11 PROCEDURE — 93793 ANTICOAG MGMT PT WARFARIN: CPT | Performed by: INTERNAL MEDICINE

## 2022-10-11 PROCEDURE — 85610 PROTHROMBIN TIME: CPT | Performed by: INTERNAL MEDICINE

## 2022-10-11 NOTE — TELEPHONE ENCOUNTER
Today's INR= 3.0  Goal= 2.0-3.0    Continues to take Tylenol as needed for pain. Advised to contact coumadin clinic if he has any steroid injections or medications added so that we can check INR sooner and for any interactions. Plan for spine biopsy soon and cardiology advised to hold warfarin for 5 days prior to the procedure. Reports that he missed 2-3 doses of warfarin over the past 2 weeks and has some bruises on left arm. INR is at the high end of goal range today. Per protocol, patient to continue current dose. Outside of protocol as INR is at the high end of goal range and he recently missed doses- weekly dose decrease 10% and recheck INR in 3 weeks.

## 2022-11-07 ENCOUNTER — TELEPHONE (OUTPATIENT)
Dept: ANTICOAGULATION | Facility: CLINIC | Age: 72
End: 2022-11-07

## 2022-11-07 NOTE — TELEPHONE ENCOUNTER
Call received from Junie. Reports that he had a  procedure done about 1 1/2 weeks ago and he held warfarin for 5 days prior to the procedure. States he is still having some blood tinge in his urine and has not resumed warfarin. Advised for him to contact the physician who performed the surgery for recommendation for resuming warfarin. We can recheck INR this week or after he has resumed taking warfarin for about 5-7 days. Junie verbalized understanding.

## 2022-11-18 PROBLEM — C61 PROSTATE CANCER (HCC): Status: ACTIVE | Noted: 2022-11-01

## 2022-11-22 ENCOUNTER — ANTI-COAG VISIT (OUTPATIENT)
Dept: ANTICOAGULATION | Facility: CLINIC | Age: 72
End: 2022-11-22
Payer: MEDICARE

## 2022-11-22 DIAGNOSIS — I34.0 NON-RHEUMATIC MITRAL REGURGITATION: ICD-10-CM

## 2022-11-22 DIAGNOSIS — I48.20 CHRONIC ATRIAL FIBRILLATION (HCC): ICD-10-CM

## 2022-11-22 DIAGNOSIS — I48.21 PERMANENT ATRIAL FIBRILLATION (HCC): ICD-10-CM

## 2022-11-22 DIAGNOSIS — Z79.01 LONG TERM (CURRENT) USE OF ANTICOAGULANTS: ICD-10-CM

## 2022-11-22 DIAGNOSIS — I25.10 CAD IN NATIVE ARTERY: ICD-10-CM

## 2022-11-22 DIAGNOSIS — Z51.81 ENCOUNTER FOR THERAPEUTIC DRUG MONITORING: ICD-10-CM

## 2022-11-22 DIAGNOSIS — Z79.01 LONG TERM CURRENT USE OF ANTICOAGULANT THERAPY: ICD-10-CM

## 2022-11-22 LAB — INR: 2.4 (ref 0.8–1.2)

## 2022-11-22 PROCEDURE — 93793 ANTICOAG MGMT PT WARFARIN: CPT | Performed by: INTERNAL MEDICINE

## 2022-11-22 PROCEDURE — 85610 PROTHROMBIN TIME: CPT | Performed by: INTERNAL MEDICINE

## 2022-11-30 ENCOUNTER — TELEPHONE (OUTPATIENT)
Dept: INTERNAL MEDICINE CLINIC | Facility: CLINIC | Age: 72
End: 2022-11-30

## 2022-11-30 NOTE — TELEPHONE ENCOUNTER
Per patient he would like to talk to nurses, per patient he has the information Gregoria Castaneda wants.  No more infos given

## 2022-11-30 NOTE — TELEPHONE ENCOUNTER
Spoke with Edelmira Paez. Reviewed warfarin instructions from visit on 11/22. He verbalized understanding.

## 2022-12-08 ENCOUNTER — TELEPHONE (OUTPATIENT)
Dept: ANTICOAGULATION | Facility: CLINIC | Age: 72
End: 2022-12-08

## 2022-12-08 DIAGNOSIS — I48.21 PERMANENT ATRIAL FIBRILLATION (HCC): Primary | ICD-10-CM

## 2022-12-08 DIAGNOSIS — Z79.01 MONITORING FOR LONG-TERM ANTICOAGULANT USE: ICD-10-CM

## 2022-12-08 DIAGNOSIS — Z51.81 MONITORING FOR LONG-TERM ANTICOAGULANT USE: ICD-10-CM

## 2022-12-14 ENCOUNTER — ANTI-COAG VISIT (OUTPATIENT)
Dept: ANTICOAGULATION | Facility: CLINIC | Age: 72
End: 2022-12-14
Payer: MEDICARE

## 2022-12-14 DIAGNOSIS — I25.10 CAD IN NATIVE ARTERY: ICD-10-CM

## 2022-12-14 DIAGNOSIS — Z79.01 LONG TERM (CURRENT) USE OF ANTICOAGULANTS: ICD-10-CM

## 2022-12-14 DIAGNOSIS — I48.21 PERMANENT ATRIAL FIBRILLATION (HCC): ICD-10-CM

## 2022-12-14 DIAGNOSIS — Z79.01 MONITORING FOR LONG-TERM ANTICOAGULANT USE: ICD-10-CM

## 2022-12-14 DIAGNOSIS — I34.0 NON-RHEUMATIC MITRAL REGURGITATION: ICD-10-CM

## 2022-12-14 DIAGNOSIS — Z51.81 ENCOUNTER FOR THERAPEUTIC DRUG MONITORING: ICD-10-CM

## 2022-12-14 DIAGNOSIS — Z51.81 MONITORING FOR LONG-TERM ANTICOAGULANT USE: ICD-10-CM

## 2022-12-14 DIAGNOSIS — I48.20 CHRONIC ATRIAL FIBRILLATION (HCC): ICD-10-CM

## 2022-12-14 LAB
INR: 2.9 (ref 0.8–1.2)
TEST STRIP EXPIRATION DATE: ABNORMAL DATE

## 2022-12-14 PROCEDURE — 85610 PROTHROMBIN TIME: CPT | Performed by: FAMILY MEDICINE

## 2022-12-14 PROCEDURE — 93793 ANTICOAG MGMT PT WARFARIN: CPT | Performed by: FAMILY MEDICINE

## 2023-01-11 ENCOUNTER — ANTI-COAG VISIT (OUTPATIENT)
Dept: ANTICOAGULATION | Facility: CLINIC | Age: 73
End: 2023-01-11

## 2023-01-11 DIAGNOSIS — Z51.81 ENCOUNTER FOR THERAPEUTIC DRUG MONITORING: ICD-10-CM

## 2023-01-11 DIAGNOSIS — I25.10 CAD IN NATIVE ARTERY: ICD-10-CM

## 2023-01-11 DIAGNOSIS — I34.0 NON-RHEUMATIC MITRAL REGURGITATION: ICD-10-CM

## 2023-01-11 DIAGNOSIS — Z51.81 MONITORING FOR LONG-TERM ANTICOAGULANT USE: ICD-10-CM

## 2023-01-11 DIAGNOSIS — I48.21 PERMANENT ATRIAL FIBRILLATION (HCC): ICD-10-CM

## 2023-01-11 DIAGNOSIS — Z79.01 LONG TERM (CURRENT) USE OF ANTICOAGULANTS: ICD-10-CM

## 2023-01-11 DIAGNOSIS — I48.20 CHRONIC ATRIAL FIBRILLATION (HCC): ICD-10-CM

## 2023-01-11 DIAGNOSIS — Z79.01 MONITORING FOR LONG-TERM ANTICOAGULANT USE: ICD-10-CM

## 2023-01-11 LAB
INR: 1.8 (ref 0.8–1.2)
TEST STRIP EXPIRATION DATE: ABNORMAL DATE

## 2023-01-11 PROCEDURE — 93793 ANTICOAG MGMT PT WARFARIN: CPT | Performed by: FAMILY MEDICINE

## 2023-01-11 PROCEDURE — 85610 PROTHROMBIN TIME: CPT | Performed by: FAMILY MEDICINE

## 2023-01-11 NOTE — PROGRESS NOTES
Face to face. Going to physical therapy for back pain and continues to take Tylenol as needed for pain. Advised to contact coumadin clinic if he has any steroid injections or medications added so that we can check INR sooner and for any interactions. Denies any missed doses, no change in diet/meds. Per protocol, patient to continue current dose and recheck INR in 4 weeks.

## 2023-02-07 ENCOUNTER — ANTI-COAG VISIT (OUTPATIENT)
Dept: ANTICOAGULATION | Facility: CLINIC | Age: 73
End: 2023-02-07

## 2023-02-07 DIAGNOSIS — Z51.81 ENCOUNTER FOR THERAPEUTIC DRUG MONITORING: ICD-10-CM

## 2023-02-07 DIAGNOSIS — I25.10 CAD IN NATIVE ARTERY: ICD-10-CM

## 2023-02-07 DIAGNOSIS — Z79.01 MONITORING FOR LONG-TERM ANTICOAGULANT USE: ICD-10-CM

## 2023-02-07 DIAGNOSIS — I48.21 PERMANENT ATRIAL FIBRILLATION (HCC): ICD-10-CM

## 2023-02-07 DIAGNOSIS — I48.20 CHRONIC ATRIAL FIBRILLATION (HCC): ICD-10-CM

## 2023-02-07 DIAGNOSIS — I34.0 NON-RHEUMATIC MITRAL REGURGITATION: ICD-10-CM

## 2023-02-07 DIAGNOSIS — Z79.01 LONG TERM (CURRENT) USE OF ANTICOAGULANTS: ICD-10-CM

## 2023-02-07 DIAGNOSIS — Z51.81 MONITORING FOR LONG-TERM ANTICOAGULANT USE: ICD-10-CM

## 2023-02-07 LAB
INR: 2.5 (ref 0.8–1.2)
TEST STRIP EXPIRATION DATE: ABNORMAL DATE

## 2023-02-07 PROCEDURE — 85610 PROTHROMBIN TIME: CPT | Performed by: INTERNAL MEDICINE

## 2023-02-07 PROCEDURE — 93793 ANTICOAG MGMT PT WARFARIN: CPT | Performed by: INTERNAL MEDICINE

## 2023-02-07 NOTE — PROGRESS NOTES
Face to face. Reports he has not needed to take Tylenol for the past 2 weeks. Going to physical therapy for back pain and is feeling better. Per protocol, patient to continue current dose and recheck INR in 4 weeks.

## 2023-03-07 ENCOUNTER — ANTI-COAG VISIT (OUTPATIENT)
Dept: ANTICOAGULATION | Facility: CLINIC | Age: 73
End: 2023-03-07

## 2023-03-07 DIAGNOSIS — Z79.01 MONITORING FOR LONG-TERM ANTICOAGULANT USE: ICD-10-CM

## 2023-03-07 DIAGNOSIS — Z51.81 MONITORING FOR LONG-TERM ANTICOAGULANT USE: ICD-10-CM

## 2023-03-07 DIAGNOSIS — Z51.81 ENCOUNTER FOR THERAPEUTIC DRUG MONITORING: ICD-10-CM

## 2023-03-07 DIAGNOSIS — I34.0 NON-RHEUMATIC MITRAL REGURGITATION: ICD-10-CM

## 2023-03-07 DIAGNOSIS — I25.10 CAD IN NATIVE ARTERY: ICD-10-CM

## 2023-03-07 DIAGNOSIS — I48.20 CHRONIC ATRIAL FIBRILLATION (HCC): ICD-10-CM

## 2023-03-07 DIAGNOSIS — I48.21 PERMANENT ATRIAL FIBRILLATION (HCC): ICD-10-CM

## 2023-03-07 DIAGNOSIS — Z79.01 LONG TERM (CURRENT) USE OF ANTICOAGULANTS: ICD-10-CM

## 2023-03-07 LAB
INR: 1.9 (ref 0.8–1.2)
TEST STRIP EXPIRATION DATE: ABNORMAL DATE

## 2023-03-07 PROCEDURE — 85610 PROTHROMBIN TIME: CPT | Performed by: INTERNAL MEDICINE

## 2023-03-07 PROCEDURE — 93793 ANTICOAG MGMT PT WARFARIN: CPT | Performed by: INTERNAL MEDICINE

## 2023-04-04 ENCOUNTER — ANTI-COAG VISIT (OUTPATIENT)
Dept: ANTICOAGULATION | Facility: CLINIC | Age: 73
End: 2023-04-04

## 2023-04-04 DIAGNOSIS — I48.20 CHRONIC ATRIAL FIBRILLATION (HCC): ICD-10-CM

## 2023-04-04 DIAGNOSIS — Z51.81 MONITORING FOR LONG-TERM ANTICOAGULANT USE: ICD-10-CM

## 2023-04-04 DIAGNOSIS — Z51.81 ENCOUNTER FOR THERAPEUTIC DRUG MONITORING: ICD-10-CM

## 2023-04-04 DIAGNOSIS — I34.0 NON-RHEUMATIC MITRAL REGURGITATION: ICD-10-CM

## 2023-04-04 DIAGNOSIS — Z79.01 MONITORING FOR LONG-TERM ANTICOAGULANT USE: ICD-10-CM

## 2023-04-04 DIAGNOSIS — I48.21 PERMANENT ATRIAL FIBRILLATION (HCC): ICD-10-CM

## 2023-04-04 DIAGNOSIS — Z79.01 LONG TERM (CURRENT) USE OF ANTICOAGULANTS: ICD-10-CM

## 2023-04-04 DIAGNOSIS — I25.10 CAD IN NATIVE ARTERY: ICD-10-CM

## 2023-04-04 LAB
INR: 1.3 (ref 0.8–1.2)
TEST STRIP EXPIRATION DATE: ABNORMAL DATE

## 2023-04-04 PROCEDURE — 93793 ANTICOAG MGMT PT WARFARIN: CPT | Performed by: INTERNAL MEDICINE

## 2023-04-04 PROCEDURE — 85610 PROTHROMBIN TIME: CPT | Performed by: INTERNAL MEDICINE

## 2023-04-25 ENCOUNTER — TELEPHONE (OUTPATIENT)
Dept: INTERNAL MEDICINE CLINIC | Facility: CLINIC | Age: 73
End: 2023-04-25

## 2023-04-25 ENCOUNTER — ANTI-COAG VISIT (OUTPATIENT)
Dept: ANTICOAGULATION | Facility: CLINIC | Age: 73
End: 2023-04-25

## 2023-04-25 DIAGNOSIS — I25.10 CAD IN NATIVE ARTERY: ICD-10-CM

## 2023-04-25 DIAGNOSIS — I48.20 CHRONIC ATRIAL FIBRILLATION (HCC): ICD-10-CM

## 2023-04-25 DIAGNOSIS — I48.21 PERMANENT ATRIAL FIBRILLATION (HCC): ICD-10-CM

## 2023-04-25 DIAGNOSIS — Z51.81 ENCOUNTER FOR THERAPEUTIC DRUG MONITORING: ICD-10-CM

## 2023-04-25 DIAGNOSIS — I34.0 NON-RHEUMATIC MITRAL REGURGITATION: ICD-10-CM

## 2023-04-25 DIAGNOSIS — Z79.01 LONG TERM (CURRENT) USE OF ANTICOAGULANTS: ICD-10-CM

## 2023-04-25 DIAGNOSIS — Z51.81 MONITORING FOR LONG-TERM ANTICOAGULANT USE: ICD-10-CM

## 2023-04-25 DIAGNOSIS — Z79.01 MONITORING FOR LONG-TERM ANTICOAGULANT USE: ICD-10-CM

## 2023-04-25 LAB — INR: 1.3 (ref 2–3)

## 2023-04-25 PROCEDURE — 93793 ANTICOAG MGMT PT WARFARIN: CPT | Performed by: INTERNAL MEDICINE

## 2023-04-25 PROCEDURE — 85610 PROTHROMBIN TIME: CPT | Performed by: INTERNAL MEDICINE

## 2023-04-25 NOTE — PROGRESS NOTES
Patient in clinic for visit. Denied changes with prescribed medications, noted he did restart otc vitamins for his prostate health, 1 being saw palmetto. Moderate interaction noted between Warfarin and [place]. He will call back with information of other vitamins he's taking. Denied changes with diet or any other concerns. Confirmed daily dose with patient. INR remains below therapeutic range. Advised per protocol, increase weekly dose 10-20%, actual increase 11.1%. Recheck INR in 2 weeks.      5 mg every Tue, Thu, Sat; 2.5 mg all other days

## 2023-04-26 NOTE — PROGRESS NOTES
Patient returned call to confirm the otc vitamins he is currently taking for his prostate health are saw palmetto and pygeum. No known interactions between warfarin and pygeum found. Reports has been taking since Anthony. Left  advising MAR has been updated, continue instructions provided at last visit on 4/25, recheck INR as instructed.

## 2023-05-02 ENCOUNTER — ANTI-COAG VISIT (OUTPATIENT)
Dept: ANTICOAGULATION | Facility: CLINIC | Age: 73
End: 2023-05-02

## 2023-05-02 DIAGNOSIS — Z79.01 LONG TERM (CURRENT) USE OF ANTICOAGULANTS: ICD-10-CM

## 2023-05-02 DIAGNOSIS — Z51.81 MONITORING FOR LONG-TERM ANTICOAGULANT USE: ICD-10-CM

## 2023-05-02 DIAGNOSIS — I48.21 PERMANENT ATRIAL FIBRILLATION (HCC): ICD-10-CM

## 2023-05-02 DIAGNOSIS — Z51.81 ENCOUNTER FOR THERAPEUTIC DRUG MONITORING: ICD-10-CM

## 2023-05-02 DIAGNOSIS — I25.10 CAD IN NATIVE ARTERY: ICD-10-CM

## 2023-05-02 DIAGNOSIS — Z79.01 MONITORING FOR LONG-TERM ANTICOAGULANT USE: ICD-10-CM

## 2023-05-02 DIAGNOSIS — I34.0 NON-RHEUMATIC MITRAL REGURGITATION: ICD-10-CM

## 2023-05-02 DIAGNOSIS — I48.20 CHRONIC ATRIAL FIBRILLATION (HCC): ICD-10-CM

## 2023-05-02 LAB — INR: 2.1 (ref 2–3)

## 2023-05-02 PROCEDURE — 93793 ANTICOAG MGMT PT WARFARIN: CPT | Performed by: INTERNAL MEDICINE

## 2023-05-02 PROCEDURE — 85610 PROTHROMBIN TIME: CPT | Performed by: INTERNAL MEDICINE

## 2023-05-02 NOTE — PROGRESS NOTES
Patient in clinic for INR check. Denies any further changes with medications/diet. INR within goal range. Confirmed daily dose with patient. Advised, per protocol,continue current dose and recheck INR in 4 weeks.     5 mg every Tue, Thu, Sat; 2.5 mg all other days

## 2023-05-15 ENCOUNTER — LAB ENCOUNTER (OUTPATIENT)
Dept: LAB | Facility: HOSPITAL | Age: 73
End: 2023-05-15
Attending: UROLOGY
Payer: MEDICARE

## 2023-05-15 DIAGNOSIS — Z79.01 MONITORING FOR LONG-TERM ANTICOAGULANT USE: ICD-10-CM

## 2023-05-15 DIAGNOSIS — I48.21 PERMANENT ATRIAL FIBRILLATION (HCC): ICD-10-CM

## 2023-05-15 DIAGNOSIS — R97.20 ELEVATED PSA: Primary | ICD-10-CM

## 2023-05-15 DIAGNOSIS — Z51.81 MONITORING FOR LONG-TERM ANTICOAGULANT USE: ICD-10-CM

## 2023-05-15 LAB — PSA SERPL-MCNC: 7.63 NG/ML (ref ?–4)

## 2023-05-15 PROCEDURE — 36415 COLL VENOUS BLD VENIPUNCTURE: CPT

## 2023-05-15 PROCEDURE — 84153 ASSAY OF PSA TOTAL: CPT

## 2023-05-30 ENCOUNTER — ANTI-COAG VISIT (OUTPATIENT)
Dept: ANTICOAGULATION | Facility: CLINIC | Age: 73
End: 2023-05-30

## 2023-05-30 DIAGNOSIS — I48.20 CHRONIC ATRIAL FIBRILLATION (HCC): ICD-10-CM

## 2023-05-30 DIAGNOSIS — I34.0 NON-RHEUMATIC MITRAL REGURGITATION: ICD-10-CM

## 2023-05-30 DIAGNOSIS — Z51.81 ENCOUNTER FOR THERAPEUTIC DRUG MONITORING: ICD-10-CM

## 2023-05-30 DIAGNOSIS — Z51.81 MONITORING FOR LONG-TERM ANTICOAGULANT USE: ICD-10-CM

## 2023-05-30 DIAGNOSIS — Z79.01 LONG TERM (CURRENT) USE OF ANTICOAGULANTS: ICD-10-CM

## 2023-05-30 DIAGNOSIS — I25.10 CAD IN NATIVE ARTERY: ICD-10-CM

## 2023-05-30 DIAGNOSIS — I48.21 PERMANENT ATRIAL FIBRILLATION (HCC): ICD-10-CM

## 2023-05-30 DIAGNOSIS — Z79.01 MONITORING FOR LONG-TERM ANTICOAGULANT USE: ICD-10-CM

## 2023-05-30 LAB
INR: 3.6 (ref 0.8–1.2)
TEST STRIP EXPIRATION DATE: ABNORMAL DATE

## 2023-05-30 PROCEDURE — 85610 PROTHROMBIN TIME: CPT | Performed by: INTERNAL MEDICINE

## 2023-05-30 PROCEDURE — 93793 ANTICOAG MGMT PT WARFARIN: CPT | Performed by: INTERNAL MEDICINE

## 2023-05-30 NOTE — PROGRESS NOTES
Face to face. Denies any change in diet/meds. Reports that he continues to take over the counter vitamins occasionally- saw palmetto and pygeum. Reports he hurt his shoulder yesterday and is going to take Tylenol occasionally for the pain. If he begins to take consistently for several days and needs to continue taking it often, he will call for a sooner INR check- understands that it may increase INR. Per protocol, decrease weekly dose. Outside of protocol, due to plan to take Tylenol- hold one dose of warfain tonight,  weekly dose 5-10%- actual decrease 10% and recheck INR in 2 weeks. : Hold; Otherwise 5 mg every Tue, Thu; 2.5 mg all other days     Instructed patient on dosing. Will review with Dr. Ruma Ayon. If alternate instructions recommended, will notify patient.

## 2023-07-24 ENCOUNTER — TELEPHONE (OUTPATIENT)
Dept: ANTICOAGULATION | Facility: CLINIC | Age: 73
End: 2023-07-24

## 2023-07-24 ENCOUNTER — ANTI-COAG VISIT (OUTPATIENT)
Dept: ANTICOAGULATION | Facility: CLINIC | Age: 73
End: 2023-07-24

## 2023-07-24 DIAGNOSIS — Z51.81 MONITORING FOR LONG-TERM ANTICOAGULANT USE: ICD-10-CM

## 2023-07-24 DIAGNOSIS — I34.0 NON-RHEUMATIC MITRAL REGURGITATION: ICD-10-CM

## 2023-07-24 DIAGNOSIS — Z79.01 MONITORING FOR LONG-TERM ANTICOAGULANT USE: ICD-10-CM

## 2023-07-24 DIAGNOSIS — I25.10 CAD IN NATIVE ARTERY: ICD-10-CM

## 2023-07-24 DIAGNOSIS — I48.21 PERMANENT ATRIAL FIBRILLATION (HCC): ICD-10-CM

## 2023-07-24 DIAGNOSIS — I48.20 CHRONIC ATRIAL FIBRILLATION (HCC): Primary | ICD-10-CM

## 2023-07-24 DIAGNOSIS — Z51.81 ENCOUNTER FOR THERAPEUTIC DRUG MONITORING: ICD-10-CM

## 2023-07-24 DIAGNOSIS — Z79.01 LONG TERM (CURRENT) USE OF ANTICOAGULANTS: ICD-10-CM

## 2023-07-24 LAB — INR: 1.7 (ref 2–3)

## 2023-07-24 PROCEDURE — 85610 PROTHROMBIN TIME: CPT | Performed by: INTERNAL MEDICINE

## 2023-07-24 PROCEDURE — 93793 ANTICOAG MGMT PT WARFARIN: CPT | Performed by: INTERNAL MEDICINE

## 2023-07-24 NOTE — PROGRESS NOTES
Patient in clinic for INR check. Denied changes with medications/diet. INR below goal range. Confirms missed dose Sat so he took extra dose yesterday, also previously missed dose a few times over last several weeks. More busy with his work schedule, sometimes forgets to take his dose. Reviewed dosing changes advised at his last visit. Reminded of option to go to the lab if unable to come in for appt due to his work schedule. Protocol: increase weekly dose 5-10%  Outside of Protocol: advised to continue current dose as reviewed today and recheck INR in 2 weeks. Will hold off on weekly dose change due to missed doses.      5 mg every Tue, Thu; 2.5 mg all other days

## 2023-07-24 NOTE — TELEPHONE ENCOUNTER
INR: 1.7    Goal: 2-3    Patient in clinic for INR check. Denied changes with medications/diet. INR below goal range. Confirms missed dose Sat so he took extra dose yesterday, also previously missed dose a few times over last several weeks. More busy with his work schedule, sometimes forgets to take his dose. Reviewed dosing changes advised at his last visit. Protocol: increase weekly dose 5-10%  Outside of Protocol: advised to continue current dose as reviewed today and recheck INR in 2 weeks. Will hold off on weekly dose change due to missed doses.      5 mg every Tue, Thu; 2.5 mg all other days

## 2023-08-07 ENCOUNTER — TELEPHONE (OUTPATIENT)
Dept: ANTICOAGULATION | Facility: CLINIC | Age: 73
End: 2023-08-07

## 2023-08-07 NOTE — TELEPHONE ENCOUNTER
Patient is unable to schedule with Dr. Cecy Hoffmann at this time. He does not have his work schedule for this week. He verbalizes understanding that he needs to see Dr. Cecy Hoffmann annually and must schedule ASAP.

## 2023-08-08 ENCOUNTER — ANTI-COAG VISIT (OUTPATIENT)
Dept: ANTICOAGULATION | Facility: CLINIC | Age: 73
End: 2023-08-08

## 2023-08-08 DIAGNOSIS — I48.21 PERMANENT ATRIAL FIBRILLATION (HCC): ICD-10-CM

## 2023-08-08 DIAGNOSIS — Z51.81 ENCOUNTER FOR THERAPEUTIC DRUG MONITORING: ICD-10-CM

## 2023-08-08 DIAGNOSIS — Z79.01 LONG TERM (CURRENT) USE OF ANTICOAGULANTS: ICD-10-CM

## 2023-08-08 DIAGNOSIS — I25.10 CAD IN NATIVE ARTERY: ICD-10-CM

## 2023-08-08 DIAGNOSIS — I34.0 NON-RHEUMATIC MITRAL REGURGITATION: ICD-10-CM

## 2023-08-08 DIAGNOSIS — I48.20 CHRONIC ATRIAL FIBRILLATION (HCC): Primary | ICD-10-CM

## 2023-08-08 DIAGNOSIS — Z79.01 MONITORING FOR LONG-TERM ANTICOAGULANT USE: ICD-10-CM

## 2023-08-08 DIAGNOSIS — Z51.81 MONITORING FOR LONG-TERM ANTICOAGULANT USE: ICD-10-CM

## 2023-08-08 LAB — INR: 2.9 (ref 2–3)

## 2023-08-08 PROCEDURE — 85610 PROTHROMBIN TIME: CPT | Performed by: INTERNAL MEDICINE

## 2023-08-08 PROCEDURE — 93793 ANTICOAG MGMT PT WARFARIN: CPT | Performed by: INTERNAL MEDICINE

## 2023-08-08 NOTE — PROGRESS NOTES
Patient seen in clinic for visit. Denies changes with medications/diet. INR within goal range. Confirmed daily dose. Advised, per protocol, continue current dose and recheck INR in 4 weeks. Patient also overdue for appt with PCP, agrees to call to schedule appt.     5 mg every Tue, Thu; 2.5 mg all other days

## 2023-08-30 NOTE — TELEPHONE ENCOUNTER
Left detailed message for patient that he needs a physical with Dr Dewayne Winslow or one of his partners ASAP or we will have to initiate the dismissal process from the clinic.

## 2023-09-05 ENCOUNTER — ANTI-COAG VISIT (OUTPATIENT)
Dept: ANTICOAGULATION | Facility: CLINIC | Age: 73
End: 2023-09-05

## 2023-09-05 DIAGNOSIS — Z51.81 ENCOUNTER FOR THERAPEUTIC DRUG MONITORING: ICD-10-CM

## 2023-09-05 DIAGNOSIS — I34.0 NON-RHEUMATIC MITRAL REGURGITATION: ICD-10-CM

## 2023-09-05 DIAGNOSIS — I48.20 CHRONIC ATRIAL FIBRILLATION (HCC): Primary | ICD-10-CM

## 2023-09-05 DIAGNOSIS — Z79.01 MONITORING FOR LONG-TERM ANTICOAGULANT USE: ICD-10-CM

## 2023-09-05 DIAGNOSIS — I25.10 CAD IN NATIVE ARTERY: ICD-10-CM

## 2023-09-05 DIAGNOSIS — I48.21 PERMANENT ATRIAL FIBRILLATION (HCC): ICD-10-CM

## 2023-09-05 DIAGNOSIS — Z79.01 LONG TERM (CURRENT) USE OF ANTICOAGULANTS: ICD-10-CM

## 2023-09-05 DIAGNOSIS — Z51.81 MONITORING FOR LONG-TERM ANTICOAGULANT USE: ICD-10-CM

## 2023-09-05 LAB
INR: 1.9 (ref 0.8–1.2)
TEST STRIP EXPIRATION DATE: ABNORMAL DATE

## 2023-09-05 PROCEDURE — 93793 ANTICOAG MGMT PT WARFARIN: CPT | Performed by: FAMILY MEDICINE

## 2023-09-05 PROCEDURE — 85610 PROTHROMBIN TIME: CPT | Performed by: FAMILY MEDICINE

## 2023-09-05 NOTE — PROGRESS NOTES
Face to face. Discussed PCP appointment as he has not seen clinic PCP since March 2022- states he did receive a voicemail regarding this. Reports that he is going to see his cardiologist this week and may switch to the [facility] coumadin clinic. States he will let us know within 1 week of his decision. Reports he missed one dose of coumadin this past Friday. Denies any other changes to diet/meds. INR is  minimally below therapeutic goal range. Per protocol, continue current dose and recheck INR in 4 weeks.  ..    5 mg every Tue, Thu; 2.5 mg all other days

## 2023-09-05 NOTE — TELEPHONE ENCOUNTER
Discussed PCP appointment as he has not seen clinic PCP since March 2022- states he did receive a voicemail regarding this and is aware. Reports that he is going to see his cardiologist this week and may switch to the [facility] coumadin clinic.  States he will let us know within 1 week of his decision to either stay with the clinic and see Dr. Kiersten Khan or switch to the [facility] coumadin clinic with his cardiologist.

## 2023-09-11 ENCOUNTER — TELEPHONE (OUTPATIENT)
Dept: INTERNAL MEDICINE CLINIC | Facility: CLINIC | Age: 73
End: 2023-09-11

## 2023-09-25 NOTE — TELEPHONE ENCOUNTER
Addendum from 9/22/23-  Detailed message left (HIPAA verified) for Cristina Reyes. Advised that we have not heard back from him regarding his decision for anticoagulation management and there is no upcoming PCP appointment scheduled. Number left and asked him to return call to the coumadin clinic to discuss or if we do not hear back from him, we will need to initiate the dismissal process from the coumadin clinic.

## 2023-09-25 NOTE — TELEPHONE ENCOUNTER
Dr. Thomas Cardenas,  Please advise on moving forward to dismiss patient from the clinic/anticoagulation clinic. Several attempts have been made to inform and discuss with Gypsy Ashely his options for staying with his current PCP/anticoagulation clinic. Last PCP appointment was in March 2022. Coumadin clinic RN spoke with him face to face at last anticoag visit on 9/5/23- as of today, he has not responded back to voicemail messages and there is no upcoming PCP appointment scheduled. Once I receive confirmation to move forward with dismissal, I will forward information to Tor Thompson.

## 2023-10-02 ENCOUNTER — HOSPITAL ENCOUNTER (INPATIENT)
Facility: HOSPITAL | Age: 73
LOS: 1 days | Discharge: HOME | DRG: 069 | End: 2023-10-03
Attending: EMERGENCY MEDICINE | Admitting: STUDENT IN AN ORGANIZED HEALTH CARE EDUCATION/TRAINING PROGRAM
Payer: MEDICARE

## 2023-10-02 ENCOUNTER — APPOINTMENT (OUTPATIENT)
Dept: CT IMAGING | Facility: HOSPITAL | Age: 73
DRG: 069 | End: 2023-10-02
Attending: EMERGENCY MEDICINE
Payer: MEDICARE

## 2023-10-02 ENCOUNTER — HOSPITAL ENCOUNTER (INPATIENT)
Facility: HOSPITAL | Age: 73
LOS: 1 days | Discharge: HOME | End: 2023-10-03
Attending: EMERGENCY MEDICINE | Admitting: STUDENT IN AN ORGANIZED HEALTH CARE EDUCATION/TRAINING PROGRAM
Payer: MEDICARE

## 2023-10-02 ENCOUNTER — APPOINTMENT (OUTPATIENT)
Dept: CT IMAGING | Facility: HOSPITAL | Age: 73
End: 2023-10-02
Attending: EMERGENCY MEDICINE
Payer: MEDICARE

## 2023-10-02 ENCOUNTER — APPOINTMENT (OUTPATIENT)
Dept: GENERAL RADIOLOGY | Facility: HOSPITAL | Age: 73
End: 2023-10-02
Attending: EMERGENCY MEDICINE
Payer: MEDICARE

## 2023-10-02 ENCOUNTER — APPOINTMENT (OUTPATIENT)
Dept: GENERAL RADIOLOGY | Facility: HOSPITAL | Age: 73
DRG: 069 | End: 2023-10-02
Attending: EMERGENCY MEDICINE
Payer: MEDICARE

## 2023-10-02 DIAGNOSIS — H54.7 VISION LOSS: Primary | ICD-10-CM

## 2023-10-02 PROBLEM — R73.9 HYPERGLYCEMIA: Status: ACTIVE | Noted: 2023-10-02

## 2023-10-02 LAB
ALBUMIN SERPL-MCNC: 3.5 G/DL (ref 3.4–5)
ALBUMIN/GLOB SERPL: 0.8 {RATIO} (ref 1–2)
ALP LIVER SERPL-CCNC: 138 U/L
ALT SERPL-CCNC: 34 U/L
ANION GAP SERPL CALC-SCNC: 9 MMOL/L (ref 0–18)
APTT PPP: 32.7 SECONDS (ref 23.3–35.6)
AST SERPL-CCNC: 26 U/L (ref 15–37)
BASOPHILS # BLD AUTO: 0.08 X10(3) UL (ref 0–0.2)
BASOPHILS NFR BLD AUTO: 1 %
BILIRUB SERPL-MCNC: 0.7 MG/DL (ref 0.1–2)
BUN BLD-MCNC: 16 MG/DL (ref 7–18)
BUN/CREAT SERPL: 16 (ref 10–20)
CALCIUM BLD-MCNC: 9.3 MG/DL (ref 8.5–10.1)
CHLORIDE SERPL-SCNC: 108 MMOL/L (ref 98–112)
CHOLEST SERPL-MCNC: 148 MG/DL (ref ?–200)
CO2 SERPL-SCNC: 23 MMOL/L (ref 21–32)
CREAT BLD-MCNC: 1 MG/DL
DEPRECATED RDW RBC AUTO: 44.3 FL (ref 35.1–46.3)
EGFRCR SERPLBLD CKD-EPI 2021: 78 ML/MIN/1.73M2 (ref 60–?)
EOSINOPHIL # BLD AUTO: 0.18 X10(3) UL (ref 0–0.7)
EOSINOPHIL NFR BLD AUTO: 2.2 %
ERYTHROCYTE [DISTWIDTH] IN BLOOD BY AUTOMATED COUNT: 13 % (ref 11–15)
GLOBULIN PLAS-MCNC: 4.3 G/DL (ref 2.8–4.4)
GLUCOSE BLD-MCNC: 115 MG/DL (ref 70–99)
HCT VFR BLD AUTO: 45.1 %
HDLC SERPL-MCNC: 56 MG/DL (ref 40–59)
HGB BLD-MCNC: 15.3 G/DL
IMM GRANULOCYTES # BLD AUTO: 0.03 X10(3) UL (ref 0–1)
IMM GRANULOCYTES NFR BLD: 0.4 %
INR BLD: 1.39 (ref 0.85–1.16)
LDLC SERPL CALC-MCNC: 78 MG/DL (ref ?–100)
LYMPHOCYTES # BLD AUTO: 2.27 X10(3) UL (ref 1–4)
LYMPHOCYTES NFR BLD AUTO: 27.1 %
MCH RBC QN AUTO: 31.4 PG (ref 26–34)
MCHC RBC AUTO-ENTMCNC: 33.9 G/DL (ref 31–37)
MCV RBC AUTO: 92.4 FL
MONOCYTES # BLD AUTO: 1.14 X10(3) UL (ref 0.1–1)
MONOCYTES NFR BLD AUTO: 13.6 %
NEUTROPHILS # BLD AUTO: 4.67 X10 (3) UL (ref 1.5–7.7)
NEUTROPHILS # BLD AUTO: 4.67 X10(3) UL (ref 1.5–7.7)
NEUTROPHILS NFR BLD AUTO: 55.7 %
NONHDLC SERPL-MCNC: 92 MG/DL (ref ?–130)
OSMOLALITY SERPL CALC.SUM OF ELEC: 292 MOSM/KG (ref 275–295)
PLATELET # BLD AUTO: 248 10(3)UL (ref 150–450)
POTASSIUM SERPL-SCNC: 4.1 MMOL/L (ref 3.5–5.1)
PROT SERPL-MCNC: 7.8 G/DL (ref 6.4–8.2)
PROTHROMBIN TIME: 17.9 SECONDS (ref 11.6–14.8)
RBC # BLD AUTO: 4.88 X10(6)UL
SODIUM SERPL-SCNC: 140 MMOL/L (ref 136–145)
TRIGL SERPL-MCNC: 71 MG/DL (ref 30–149)
TROPONIN I HIGH SENSITIVITY: 276 NG/L
VLDLC SERPL CALC-MCNC: 11 MG/DL (ref 0–30)
WBC # BLD AUTO: 8.4 X10(3) UL (ref 4–11)

## 2023-10-02 PROCEDURE — 70450 CT HEAD/BRAIN W/O DYE: CPT | Performed by: EMERGENCY MEDICINE

## 2023-10-02 PROCEDURE — 71045 X-RAY EXAM CHEST 1 VIEW: CPT | Performed by: EMERGENCY MEDICINE

## 2023-10-02 RX ORDER — ASPIRIN 81 MG/1
324 TABLET, CHEWABLE ORAL ONCE
Status: COMPLETED | OUTPATIENT
Start: 2023-10-02 | End: 2023-10-02

## 2023-10-03 ENCOUNTER — APPOINTMENT (OUTPATIENT)
Dept: MRI IMAGING | Facility: HOSPITAL | Age: 73
End: 2023-10-03
Attending: STUDENT IN AN ORGANIZED HEALTH CARE EDUCATION/TRAINING PROGRAM
Payer: MEDICARE

## 2023-10-03 ENCOUNTER — APPOINTMENT (OUTPATIENT)
Dept: CV DIAGNOSTICS | Facility: HOSPITAL | Age: 73
DRG: 069 | End: 2023-10-03
Attending: STUDENT IN AN ORGANIZED HEALTH CARE EDUCATION/TRAINING PROGRAM
Payer: MEDICARE

## 2023-10-03 ENCOUNTER — APPOINTMENT (OUTPATIENT)
Dept: MRI IMAGING | Facility: HOSPITAL | Age: 73
DRG: 069 | End: 2023-10-03
Attending: STUDENT IN AN ORGANIZED HEALTH CARE EDUCATION/TRAINING PROGRAM
Payer: MEDICARE

## 2023-10-03 ENCOUNTER — APPOINTMENT (OUTPATIENT)
Dept: CV DIAGNOSTICS | Facility: HOSPITAL | Age: 73
End: 2023-10-03
Attending: STUDENT IN AN ORGANIZED HEALTH CARE EDUCATION/TRAINING PROGRAM
Payer: MEDICARE

## 2023-10-03 VITALS
WEIGHT: 196.69 LBS | TEMPERATURE: 99 F | HEART RATE: 57 BPM | HEIGHT: 71 IN | OXYGEN SATURATION: 96 % | SYSTOLIC BLOOD PRESSURE: 178 MMHG | RESPIRATION RATE: 16 BRPM | BODY MASS INDEX: 27.54 KG/M2 | DIASTOLIC BLOOD PRESSURE: 98 MMHG

## 2023-10-03 PROBLEM — R73.9 HYPERGLYCEMIA: Status: RESOLVED | Noted: 2023-10-02 | Resolved: 2023-10-03

## 2023-10-03 PROBLEM — H54.7 VISION LOSS: Status: ACTIVE | Noted: 2023-10-03

## 2023-10-03 PROBLEM — H54.7 VISION LOSS: Status: RESOLVED | Noted: 2023-10-03 | Resolved: 2023-10-03

## 2023-10-03 LAB
ANION GAP SERPL CALC-SCNC: 8 MMOL/L (ref 0–18)
ATRIAL RATE: 97 BPM
BUN BLD-MCNC: 16 MG/DL (ref 7–18)
BUN/CREAT SERPL: 18.2 (ref 10–20)
CALCIUM BLD-MCNC: 8.9 MG/DL (ref 8.5–10.1)
CHLORIDE SERPL-SCNC: 106 MMOL/L (ref 98–112)
CO2 SERPL-SCNC: 25 MMOL/L (ref 21–32)
CREAT BLD-MCNC: 0.88 MG/DL
DEPRECATED RDW RBC AUTO: 44.3 FL (ref 35.1–46.3)
EGFRCR SERPLBLD CKD-EPI 2021: 90 ML/MIN/1.73M2 (ref 60–?)
ERYTHROCYTE [DISTWIDTH] IN BLOOD BY AUTOMATED COUNT: 12.9 % (ref 11–15)
EST. AVERAGE GLUCOSE BLD GHB EST-MCNC: 120 MG/DL (ref 68–126)
GLUCOSE BLD-MCNC: 114 MG/DL (ref 70–99)
GLUCOSE BLDC GLUCOMTR-MCNC: 106 MG/DL (ref 70–99)
GLUCOSE BLDC GLUCOMTR-MCNC: 99 MG/DL (ref 70–99)
HBA1C MFR BLD: 5.8 % (ref ?–5.7)
HCT VFR BLD AUTO: 43.8 %
HGB BLD-MCNC: 14.5 G/DL
MCH RBC QN AUTO: 31 PG (ref 26–34)
MCHC RBC AUTO-ENTMCNC: 33.1 G/DL (ref 31–37)
MCV RBC AUTO: 93.8 FL
OSMOLALITY SERPL CALC.SUM OF ELEC: 290 MOSM/KG (ref 275–295)
PLATELET # BLD AUTO: 228 10(3)UL (ref 150–450)
POTASSIUM SERPL-SCNC: 4.1 MMOL/L (ref 3.5–5.1)
Q-T INTERVAL: 420 MS
Q-T INTERVAL: 424 MS
Q-T INTERVAL: 428 MS
QRS DURATION: 126 MS
QRS DURATION: 132 MS
QRS DURATION: 132 MS
QTC CALCULATION (BEZET): 484 MS
QTC CALCULATION (BEZET): 486 MS
QTC CALCULATION (BEZET): 502 MS
R AXIS: 36 DEGREES
R AXIS: 36 DEGREES
R AXIS: 52 DEGREES
RBC # BLD AUTO: 4.67 X10(6)UL
SODIUM SERPL-SCNC: 139 MMOL/L (ref 136–145)
T AXIS: -9 DEGREES
T AXIS: 0 DEGREES
T AXIS: 7 DEGREES
TROPONIN I HIGH SENSITIVITY: 256 NG/L
TROPONIN I HIGH SENSITIVITY: 260 NG/L
VENTRICULAR RATE: 79 BPM
VENTRICULAR RATE: 80 BPM
VENTRICULAR RATE: 83 BPM
WBC # BLD AUTO: 5.8 X10(3) UL (ref 4–11)

## 2023-10-03 PROCEDURE — 70551 MRI BRAIN STEM W/O DYE: CPT | Performed by: STUDENT IN AN ORGANIZED HEALTH CARE EDUCATION/TRAINING PROGRAM

## 2023-10-03 PROCEDURE — 70549 MR ANGIOGRAPH NECK W/O&W/DYE: CPT | Performed by: STUDENT IN AN ORGANIZED HEALTH CARE EDUCATION/TRAINING PROGRAM

## 2023-10-03 PROCEDURE — 99239 HOSP IP/OBS DSCHRG MGMT >30: CPT | Performed by: HOSPITALIST

## 2023-10-03 PROCEDURE — 70544 MR ANGIOGRAPHY HEAD W/O DYE: CPT | Performed by: STUDENT IN AN ORGANIZED HEALTH CARE EDUCATION/TRAINING PROGRAM

## 2023-10-03 PROCEDURE — 93306 TTE W/DOPPLER COMPLETE: CPT | Performed by: STUDENT IN AN ORGANIZED HEALTH CARE EDUCATION/TRAINING PROGRAM

## 2023-10-03 PROCEDURE — 99223 1ST HOSP IP/OBS HIGH 75: CPT | Performed by: OTHER

## 2023-10-03 RX ORDER — ENOXAPARIN SODIUM 100 MG/ML
1 INJECTION SUBCUTANEOUS EVERY 12 HOURS SCHEDULED
Status: DISCONTINUED | OUTPATIENT
Start: 2023-10-03 | End: 2023-10-03

## 2023-10-03 RX ORDER — ASPIRIN 81 MG/1
81 TABLET, CHEWABLE ORAL DAILY
Status: DISCONTINUED | OUTPATIENT
Start: 2023-10-03 | End: 2023-10-03

## 2023-10-03 RX ORDER — ONDANSETRON 2 MG/ML
4 INJECTION INTRAMUSCULAR; INTRAVENOUS EVERY 6 HOURS PRN
Status: DISCONTINUED | OUTPATIENT
Start: 2023-10-03 | End: 2023-10-03

## 2023-10-03 RX ORDER — HYDRALAZINE HYDROCHLORIDE 50 MG/1
50 TABLET, FILM COATED ORAL EVERY 6 HOURS PRN
Status: DISCONTINUED | OUTPATIENT
Start: 2023-10-03 | End: 2023-10-03

## 2023-10-03 RX ORDER — METOCLOPRAMIDE HYDROCHLORIDE 5 MG/ML
10 INJECTION INTRAMUSCULAR; INTRAVENOUS EVERY 8 HOURS PRN
Status: DISCONTINUED | OUTPATIENT
Start: 2023-10-03 | End: 2023-10-03

## 2023-10-03 RX ORDER — HYDRALAZINE HYDROCHLORIDE 20 MG/ML
10 INJECTION INTRAMUSCULAR; INTRAVENOUS EVERY 2 HOUR PRN
Status: DISCONTINUED | OUTPATIENT
Start: 2023-10-03 | End: 2023-10-03

## 2023-10-03 RX ORDER — LABETALOL HYDROCHLORIDE 5 MG/ML
10 INJECTION, SOLUTION INTRAVENOUS EVERY 10 MIN PRN
Status: DISCONTINUED | OUTPATIENT
Start: 2023-10-03 | End: 2023-10-03

## 2023-10-03 RX ORDER — KETOROLAC TROMETHAMINE 15 MG/ML
30 INJECTION, SOLUTION INTRAMUSCULAR; INTRAVENOUS ONCE
Status: COMPLETED | OUTPATIENT
Start: 2023-10-03 | End: 2023-10-03

## 2023-10-03 RX ORDER — LABETALOL HYDROCHLORIDE 5 MG/ML
10 INJECTION, SOLUTION INTRAVENOUS EVERY 6 HOURS PRN
Status: DISCONTINUED | OUTPATIENT
Start: 2023-10-03 | End: 2023-10-03 | Stop reason: ALTCHOICE

## 2023-10-03 RX ORDER — GADOTERATE MEGLUMINE 376.9 MG/ML
20 INJECTION INTRAVENOUS
Status: COMPLETED | OUTPATIENT
Start: 2023-10-03 | End: 2023-10-03

## 2023-10-03 RX ORDER — ATORVASTATIN CALCIUM 80 MG/1
80 TABLET, FILM COATED ORAL NIGHTLY
Status: DISCONTINUED | OUTPATIENT
Start: 2023-10-03 | End: 2023-10-03

## 2023-10-03 RX ORDER — ACETAMINOPHEN 325 MG/1
650 TABLET ORAL EVERY 4 HOURS PRN
Status: DISCONTINUED | OUTPATIENT
Start: 2023-10-03 | End: 2023-10-03

## 2023-10-03 RX ORDER — ACETAMINOPHEN 650 MG/1
650 SUPPOSITORY RECTAL EVERY 4 HOURS PRN
Status: DISCONTINUED | OUTPATIENT
Start: 2023-10-03 | End: 2023-10-03

## 2023-10-03 NOTE — CM/SW NOTE
10/03/23 1300   CM/SW Referral Data   Referral Source    Reason for Referral Discharge planning   Informant Patient   Medical Hx   Does patient have an established PCP? Yes  (Dr Maryjo Abdullahi)   Patient Info   Patient's Current Mental Status at Time of Assessment Oriented; Alert   Patient's Home Environment Condo/Apt with elevator   Number of Levels in Home 3   Patient lives with Alone   Patient Status Prior to Admission   Independent with ADLs and Mobility Yes   Discharge Needs   Anticipated D/C needs No anticipated discharge needs     Per protocol MDO received for HH eval. CM met with patient at bedside, provided above information. Patient lives home alone normally but currently has a friend from [place] staying with him for the next 4 months. Patient is indepdent with all ADLs at baseline, no DME, drives and works full time. No history of HH or DINORA. Per PT/OT patient has no needs at discharge, safe to return home. Discussed with patient's RN, no anticipate discharge needs and patient likely to discharge today.      Plan: Return home when medically cleared    Tr White, RN, BSN

## 2023-10-03 NOTE — PHYSICAL THERAPY NOTE
PHYSICAL THERAPY EVALUATION - INPATIENT     Room Number: 295/565-R  Evaluation Date: 10/3/2023  Type of Evaluation: Initial   Physician Order: PT Eval and Treat    Presenting Problem: L eye vision loss, lip numbness, TIA     Reason for Therapy: Mobility Dysfunction and Discharge Planning    PHYSICAL THERAPY ASSESSMENT     Patient is a 74year old male admitted 10/2/2023 for L eye vision loss, lip numbness, TIA. Patient's current functional deficits include impaired activity tolerance and cardiovascular response to activity, which are below the patient's pre-admission status. Patient in bed upon arrival. RN approved activity. Educated patient on POC and benefits of mobilization. Agreeable to participate. Patient reporting chronic low back pain, not quantified per the pain scale. Patient reports that all symptoms have resolved - reports that he feels a little \"cloudy\" but says that he didn't sleep well last night. Patient demonstrates ability to accurately read the wall clock and visual tracking is [employer]. Patient reports that he has been up independently in his room; activity limited this session due to elevated BP (178/98 mmHg) - RN made aware. Will keep patient on PT caseload for 1-2 more sessions to ensure that patient is able to tolerate more than household distance ambulation. Bed Mobility: Supervision supine>EOB. Patient tolerated sitting EOB approx 8 minutes, requiring BUE support and Supervision in order to maintain static sitting balance. Transfers: Supervision sit<>stand without assistive device; cues provided for appropriate UE placement during functional transfers. Instruction on activity pacing upon standing to allow body time to acclimate to change in position. Tolerated static standing unsupported with Supervision for approx 1 minute prior to mobilization. Ambulation: Supervision without assistive device for 25 ft; decreased jose f speed, however, gait pattern WFL overall.      Patient sitting in bedside chair at end of session. Needs in reach. RN aware. The patient's Approx Degree of Impairment: 35.83% has been calculated based on documentation in the [facility] '6 clicks' Inpatient Basic Mobility Short Form. Research supports that patients with this level of impairment may benefit from home with initial 24 hour supervision/care. Patient will benefit from continued IP PT services to address these deficits in preparation for discharge. DISCHARGE RECOMMENDATIONS  PT Discharge Recommendations: Home;24 hour care/supervision    PLAN  PT Treatment Plan: Bed mobility; Body mechanics; Coordination; Endurance; Energy conservation;Patient education;Gait training;Transfer training;Balance training  Rehab Potential : Good  Frequency (Obs): 5x/week (1-2 more sessions)       PHYSICAL THERAPY MEDICAL/SOCIAL HISTORY     Problem List  Active Problems:    * No active hospital problems. 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 Has a friend staying with him until January (while they await their house being built). SUBJECTIVE  \"I'm the President of the company\"    PHYSICAL THERAPY EXAMINATION     OBJECTIVE  Precautions: None  Fall Risk: Standard fall risk    PAIN ASSESSMENT  Rating: Unable to rate  Location: chronic low back  Management Techniques: Activity promotion; Body mechanics;Repositioning    COGNITION  Overall Cognitive Status:  WFL - within functional limits    RANGE OF MOTION AND STRENGTH ASSESSMENT  Upper extremity ROM and strength are within functional limits   Lower extremity ROM is within functional limits   Lower extremity strength is within functional limits     BALANCE  Static Sitting: Normal  Dynamic Sitting: Normal  Static Standing: Good  Dynamic Standing: Good    ACTIVITY TOLERANCE  BP: (!) 178/98 (RN aware)  BP Location: Right arm  BP Method: Automatic  Patient Position: Sitting    AM-PAC '6-Clicks' INPATIENT SHORT FORM - BASIC MOBILITY  How much difficulty does the patient currently have. .. Patient Difficulty: Turning over in bed (including adjusting bedclothes, sheets and blankets)?: None   Patient Difficulty: Sitting down on and standing up from a chair with arms (e.g., wheelchair, bedside commode, etc.): A Little   Patient Difficulty: Moving from lying on back to sitting on the side of the bed?: None   How much help from another person does the patient currently need. .. Help from Another: Moving to and from a bed to a chair (including a wheelchair)?: A Little   Help from Another: Need to walk in hospital room?: A Little   Help from Another: Climbing 3-5 steps with a railing?: A Little     AM-PAC Score:  Raw Score: 20   Approx Degree of Impairment: 35.83%   Standardized Score (AM-PAC Scale): 47.67   CMS Modifier (G-Code): CJ    FUNCTIONAL ABILITY STATUS  Functional Mobility/Gait Assessment  Gait Assistance: Supervision  Distance (ft): 25  Assistive Device: None  Pattern: Within Functional Limits (decreased jose f speed)    Exercise/Education Provided:  Bed mobility  Body mechanics  Energy conservation  Functional activity tolerated  Gait training  Posture  Transfer training    Patient End of Session: Up in chair;Needs met;Call light within reach;RN aware of session/findings; All patient questions and concerns addressed    CURRENT GOALS    Goals to be met by: 10/10/23  Patient Goal Patient's self-stated goal is: go home   Goal #1 Patient is able to demonstrate supine - sit EOB @ level: independent     Goal #1   Current Status    Goal #2 Patient is able to demonstrate transfers Sit to/from Stand at assistance level: independent with none     Goal #2  Current Status    Goal #3 Patient is able to ambulate 200 feet with assist device: none at assistance level: independent   Goal #3   Current Status    Goal #4 Patient to demonstrate independence with home activity/exercise instructions provided to patient in preparation for discharge.    Goal #4   Current Status      Patient Evaluation Complexity Level:  History Low - no personal factors and/or co-morbidities   Examination of body systems Low - addressing 1-2 elements   Clinical Presentation Low - Stable   Clinical Decision Making Low Complexity       Therapeutic Activity: 10 minutes

## 2023-10-03 NOTE — ED NOTES
Orders for admission, patient is aware of plan and ready to go upstairs. Any questions, please call ED MICHAELA Juares  at extension [phone].        Titratable drug(s) infusing:n/a  Rate:n/a    LOC at time of transport:x4    Other pertinent information:    CIWA score=n/a  NIH score=0

## 2023-10-03 NOTE — PLAN OF CARE
Patient had MRI of brain this morning negative for stroke, MRA was ok per neurology, no significant stenosis. Patient was seen by cardiology for his elevated troponin's and was cleared for discharging home. Pt declined to receive the flu vaccine. Discharge instructions reviewed with the patient, all questions answered and pt is discharging home in stable condition. Problem: Patient Centered Care  Goal: Patient preferences are identified and integrated in the patient's plan of care  Description: Interventions:  - What would you like us to know as we care for you? I live at home with my friend  - Provide timely, complete, and accurate information to patient/family  - Incorporate patient and family knowledge, values, beliefs, and cultural backgrounds into the planning and delivery of care  - Encourage patient/family to participate in care and decision-making at the level they choose  - Honor patient and family perspectives and choices  Outcome: Adequate for Discharge     Problem: Patient/Family Goals  Goal: Patient/Family Long Term Goal  Description: Patient's Long Term Goal: To stay out of the hospital    Interventions:  - Medical management, follow up care  - See additional Care Plan goals for specific interventions  Outcome: Adequate for Discharge  Goal: Patient/Family Short Term Goal  Description: Patient's Short Term Goal: To figure out what happened    Interventions:   [facility] admission, neurology consult  - See additional Care Plan goals for specific interventions  Outcome: Adequate for Discharge     Problem: NEUROLOGICAL - ADULT  Goal: Achieves stable or improved neurological status  Description: INTERVENTIONS  - Assess for and report changes in neurological status  - Initiate measures to prevent increased intracranial pressure  - Maintain blood pressure and fluid volume within ordered parameters to optimize cerebral perfusion and minimize risk of hemorrhage  - Monitor temperature, glucose, and sodium. Initiate appropriate interventions as ordered  Outcome: Adequate for Discharge  Goal: Achieves maximal functionality and self care  Description: INTERVENTIONS  - Monitor swallowing and airway patency with patient fatigue and changes in neurological status  - Encourage and assist patient to increase activity and self care with guidance from PT/OT  - Encourage visually impaired, hearing impaired and aphasic patients to use assistive/communication devices  Outcome: Adequate for Discharge     Problem: PAIN - ADULT  Goal: Verbalizes/displays adequate comfort level or patient's stated pain goal  Description: INTERVENTIONS:  - Encourage pt to monitor pain and request assistance  - Assess pain using appropriate pain scale  - Administer analgesics based on type and severity of pain and evaluate response  - Implement non-pharmacological measures as appropriate and evaluate response  - Consider cultural and social influences on pain and pain management  - Manage/alleviate anxiety  - Utilize distraction and/or relaxation techniques  - Monitor for opioid side effects  - Notify MD/LIP if interventions unsuccessful or patient reports new pain  - Anticipate increased pain with activity and pre-medicate as appropriate  Outcome: Adequate for Discharge     Problem: RISK FOR INFECTION - ADULT  Goal: Absence of fever/infection during anticipated neutropenic period  Description: INTERVENTIONS  - Monitor WBC  - Administer growth factors as ordered  - Implement neutropenic guidelines  Outcome: Adequate for Discharge     Problem: SAFETY ADULT - FALL  Goal: Free from fall injury  Description: INTERVENTIONS:  - Assess pt frequently for physical needs  - Identify cognitive and physical deficits and behaviors that affect risk of falls.   - [place] fall precautions as indicated by assessment.  - Educate pt/family on patient safety including physical limitations  - Instruct pt to call for assistance with activity based on assessment  - Modify environment to reduce risk of injury  - Provide assistive devices as appropriate  - Consider OT/PT consult to assist with strengthening/mobility  - Encourage toileting schedule  Outcome: Adequate for Discharge     Problem: DISCHARGE PLANNING  Goal: Discharge to home or other facility with appropriate resources  Description: INTERVENTIONS:  - Identify barriers to discharge w/pt and caregiver  - Include patient/family/discharge partner in discharge planning  - Arrange for needed discharge resources and transportation as appropriate  - Identify discharge learning needs (meds, wound care, etc)  - Arrange for interpreters to assist at discharge as needed  - Consider post-discharge preferences of patient/family/discharge partner  - Complete POLST form as appropriate  - Assess patient's ability to be responsible for managing their own health  - Refer to Case Management Department for coordinating discharge planning if the patient needs post-hospital services based on physician/LIP order or complex needs related to functional status, cognitive ability or social support system  Outcome: Adequate for Discharge

## 2023-10-03 NOTE — ED INITIAL ASSESSMENT (HPI)
Pt to ED for concern of a stroke. Per pt, at 1910, he lost vision in his left eye for a minute, then came back. Pt currently only feels numbness across his lips and a feeling tired. [place] stroke scale performed, negative in all aspects, clear speech, equal strength, equal smile. Pt is on blood thinners.

## 2023-10-04 ENCOUNTER — PATIENT OUTREACH (OUTPATIENT)
Dept: CASE MANAGEMENT | Age: 73
End: 2023-10-04

## 2023-10-04 ENCOUNTER — TELEPHONE (OUTPATIENT)
Dept: INTERNAL MEDICINE UNIT | Facility: HOSPITAL | Age: 73
End: 2023-10-04

## 2023-10-04 PROCEDURE — 1111F DSCHRG MED/CURRENT MED MERGE: CPT

## 2023-10-04 RX ORDER — ASPIRIN 81 MG/1
81 TABLET ORAL NIGHTLY
COMMUNITY

## 2023-10-04 NOTE — TELEPHONE ENCOUNTER
Please refer to TE- Anticoagulation from 8/7/23. Coumadin clinic RN discussed options with Edd Walton in detail and no response was received from him. On 9/25, notification was sent to Terie Dakins to proceed with dismissal from the [address] [place] [address] and [facility], [facility]. Dr. Jojo Covarrubiasails aware and agreed with plan.

## 2023-10-04 NOTE — PROGRESS NOTES
TCM request - Neurology apt     Called Dr Maggie Smith office and they do have a note to the nurse and the nurse will be calling the pt with the scheduled apt    All other apts have been made through [facility]    Closing encounter

## 2023-10-04 NOTE — TELEPHONE ENCOUNTER
Even if the letter has gone out, we are still obligated to provide care for 30 days. Call patient and get him in.

## 2023-10-04 NOTE — TELEPHONE ENCOUNTER
Call back received from pt states he contacted the [facility] Cardiology office yesterday requesting to transfer outpatient anticoagulation services and is awaiting call back from them later today. Stressed to pt the need for INR follow up either tomorrow or friday. Pt verbalized understanding.

## 2023-10-04 NOTE — PROGRESS NOTES
Initial Post Discharge Follow Up   Discharge Date: 10/3/23  Contact Date: 10/4/2023    Consent Verification:  Assessment Completed With: Patient  HIPAA Verified? Yes    Discharge Dx:     Transient ischemic attack     Was TCC ordered: no    Did Pt Receive Their Flu Shot (Sept-March):  no    General:   How have you been since your discharge from the hospital? Pt reports feeling better, since hospital discharge--denies any further vision changes or upper lip numbness, tolerating heart healthy diet, staying hydrated, ambulating independently. Patient denies fever, headache, dizziness, nausea, vomiting, diarrhea, chest pain or shortness of breath at this time. Do you have any pain since discharge?  no   How well was your pain managed while in the hospital?   On a scale of 1-5   1- Very Poor and 5- Very well   Very well  When you were leaving the hospital were your discharge instructions reviewed with you? yes  How well were your discharge instructions explained to you? On a scale of 1-5   1- Very Poor and 5- Very well   Very well  Do you have any questions about your discharge instructions? No  Before leaving the hospital was your diagnoses explained to you? Yes  Do you have any questions about your diagnoses? No  Are you able to perform normal daily activities of living as you have prior to your hospital stay (dressing, bathing, ambulating to the bathroom, etc)? yes  (NCM) Was patient given a different diet per AVS? yes  If so, which diet? Heart healthy foods, limit alcohol  Are there any barriers to following this diet? no    Medications:   Current Outpatient Medications   Medication Sig Dispense Refill    Saw Palmetto, Serenoa repens, (SAW PALMETTO OR) Take 1 capsule by mouth daily. NETTLE-PYGEUM AFRICANUM OR Take 1 capsule by mouth daily. warfarin 5 MG Oral Tab Take 1 tablet (5 mg total) by mouth nightly. 90 tablet 3    ZINC OR Use as directed in the mouth or throat.       Cyanocobalamin (VITAMIN B-12 OR) Take by mouth.      metoprolol tartrate 25 MG Oral Tab Take 1 tablet (25 mg total) by mouth 2 (two) times daily. 180 tablet 1    ATORVASTATIN 20 MG Oral Tab Take 1 tablet by mouth once daily 90 tablet 0    Warfarin Sodium 2 MG Oral Tab Take 1 tablet (2 mg total) by mouth nightly. No coumadin tonight 12/31. Start taking on Wednesday 1/1. INR on Friday 1/3, call MD for instructions on dose and the timing of the next INR 30 tablet 0    Ergocalciferol (VITAMIN D OR) Take 1,000 Units by mouth daily.         (NCM)  Were there any medication changes noted on AVS?  no--pt did start aspirin 81 mg nightly, last night  May I go over your medications with you to make sure we are not missing anything?yes  Are there any reasons that keep you from taking your medication as prescribed? No  Are you having any concerns with constipation? No  Referrals/orders at D/C:  Home Health/Services ordered at D/C? No   DME ordered at D/C? No    SDOH:  Transportation: Transportation Needs: No Transportation Needs (10/4/2023)      Transportation Needs          Lack of Transportation: No  Financial Strain: Financial Resource Strain: Low Risk  (10/4/2023)      Financial Resource Strain          Difficulty of Paying Living Expenses: Not very hard          Med Affordability: No     DX: specifics:  Warfarin/Coumadin:  Warfarin/Coumadin:   Next PT/INR Date: 10/05 or 10/06/2023   Current Warfarin Dose: 5 mg T/Th and 2.5 mg every other day   Does patient know who to follow-up with Warfarin/Coumadin management? yes--pt trying to get enrolled in [facility] anti-coag clinic    Who manages Warfarin/Coumadin? Who is monitoring PT/INR? Count includes the [facility], PCP  NCM Reviewed next PT/INR appointment with pt:  Yes  Interventions:     Needs post D/C:   Now that you are home, are there any needs or concerns you need addressed before your next visit with your PCP?  (DME, meds, disease concerns, Etc): Yes--assist with Count includes the [facility].     Follow up appointments: This patient requires a follow-up appointment with a Neurologist due to stroke. KEVIN Treatment Team:  Ruma Schofield  Recent KEVIN Visits    None        Follow-up Information    Follow up With Specialties Details Why Contact Info   Jessica Ngo MD Internal Medicine Schedule an appointment as soon as possible for a visit in 1 week(s)  [address]  [phone]   Nam Rock MD NEUROLOGY Schedule an appointment as soon as possible for a visit in 1 month(s)  [address] Kerrin Klinefelter, MD Cardiovascular Diseases, CARDIOLOGY Go to As scheduled [address] [facility] [phone]       PCP TCM or HFU appointment: scheduled at D/C within 7-14 days  no     NCM Reviewed/scheduled/rescheduled PCP TCM or HFU appointment with pt:  Yes      Have you made all of your follow up appointments? no    Is there any reason as to why you cannot make your appointments? No     NCM Reviewed upcoming Specialist Appt with patient     Yes    Overall Rating: How would you rate the care you received while in the hospital?  excellent     Interventions by NCM: Spoke with pt--feeling better, since hospital discharge--denies any further vision changes or upper lip numbness, tolerating heart healthy diet, staying hydrated, ambulating independently. Patient denies fever, headache, dizziness, nausea, vomiting, diarrhea, chest pain or shortness of breath at this time. Discussed diet, activity, medications and need for f/u visits. Scheduled TCM appt with Dr. Michelle Almaraz for 10/13/2023--pt declines sooner appt d/t work schedule. Spoke with Karolina Olvera at [facility]--unable to book pt within one month--she will send message to [facility] clinical staff for appt clarification and f/u with pt. Pt has not heard back from [facility] anti-coag clinic.  Called Dr. Katheryn Anderson office and spoke with Shreya-she confirms f/u appt with Dr. Sha Altman 10/12/2023 at noon, she did place lab order for INR for pt to complete tomorrow or Friday, as advised. Also left message for [facility] anti-coag clinic (B location) for call back to pt and/or this NCM--left pt's # and NCM #[phone]. Updated today's anti-coag TE with this information. Patient aware when to contact PCP/specialists and when to seek emergency care. No further questions/concerns at this time. Pt called this NCM back and confirmed appt and PT/INR draw today at 2:30 p.m. at [facility] anti-coag clinic in [facility]. No further questions/concerns at this time. CCM referral placed:  No    BOOK BY DATE: 10/17/2023    [x]  Discharge Summary, Discharge medications reviewed/discussed/and reconciled against outpatient medications with patient,  and orders reviewed and discussed. Any changes or updates to medications and or orders sent to PCP.

## 2023-10-04 NOTE — TELEPHONE ENCOUNTER
FYI/update: This NCM spoke with pt for TCM today-scheduled TCM appt with Dr. Amanda Espinosa for 10/13/2023--pt declines sooner appt d/t work schedule. Pt has not heard back from [facility] anti-coag clinic. Called Dr. Yeboah Fly office and spoke with Shreya-she confirms f/u appt with Dr. Saucedo Goes 10/12/2023 at noon, she did place lab order for INR for pt to complete tomorrow or Friday, as advised. Also left message for [facility] anti-coag clinic (B location) for call back to pt and/or this NCM--left pt's # and NCM #[phone].

## 2023-10-04 NOTE — TELEPHONE ENCOUNTER
Thank you for the update Miroslava Duval! I will note on my end that he will now be managed by the [address] [place] [address].

## 2023-10-04 NOTE — PROGRESS NOTES
Scheduled TCM appt with Dr. Rockford Felty for 10/13/2023--pt declines sooner appt d/t work schedule. Spoke with Barbara Fletcher at [facility]--unable to book pt within one month--she will send message to [facility] clinical staff for appt clarification and f/u with pt. Pt has not heard back from [facility] anti-coag clinic. Called Dr. Doss Axis office and spoke with Shreya-she confirms f/u appt with Dr. Boone Soares 10/12/2023 at noon, she did place lab order for INR for pt to complete tomorrow or Friday, as advised. Also left message for [facility] anti-coag clinic (B location) for call back to pt and/or this NCM--left pt's # and NCM #[phone]. Pt called this NCM back and confirmed appt and PT/INR draw today at 2:30 p.m. at [facility] anti-coag clinic in [facility]. TCM has contacted patient and patient needs additional appointments. Please contact patient for assistance with scheduling a follow-up appointment for neurology--no KEVIN TE to neuro clinical staff noted from [address] [place] [address] above. Thank you! Follow up appointments: This patient requires a follow-up appointment with a Neurologist due to stroke.   KEVIN Treatment Team:  Helene Suazo  Recent KEVIN Visits    None        Follow-up Information    Follow up With Specialties Details Why Contact Info   Bandar Romeo MD Internal Medicine Schedule an appointment as soon as possible for a visit in 1 week(s)  [address] [facility]  [phone]   Donavan Portillo MD NEUROLOGY Schedule an appointment as soon as possible for a visit in 1 month(s)  [address] [facility] [address] Alina Andre MD Cardiovascular Diseases, CARDIOLOGY Go to As scheduled [address] [facility] [phone]     Future Appointments   Date Time Provider Tenzin Shanti   10/13/2023  2:30 PM Bandar Romeo MD [facility]

## 2023-10-04 NOTE — TELEPHONE ENCOUNTER
Pt called this NCM back and confirmed appt and PT/INR draw today at 2:30 p.m. at [facility] anti-coag clinic in [facility]. No further questions/concerns at this time.

## 2023-10-04 NOTE — TELEPHONE ENCOUNTER
Patient dc from [place] yesterday on continued dose of Coumadin. Reviewed w Arun Campos whom endorsed pt should have INR follow up Thursday 10/5/23 or Friday 10/6/23. Per last  Coumadin clinic encounter pt was undecided whether to continue with [facility] or transition to [facility] Coumadin clinic. Call made to attempt to clarify this with patient and notify to follow up on 10/5 or 10/6 for INR draw. No answer. VM left requesting call back.

## 2023-10-11 PROBLEM — G45.9 TIA (TRANSIENT ISCHEMIC ATTACK): Status: ACTIVE | Noted: 2023-10-01

## 2023-10-13 ENCOUNTER — OFFICE VISIT (OUTPATIENT)
Dept: INTERNAL MEDICINE CLINIC | Facility: CLINIC | Age: 73
End: 2023-10-13

## 2023-10-13 VITALS
SYSTOLIC BLOOD PRESSURE: 134 MMHG | WEIGHT: 195.81 LBS | BODY MASS INDEX: 28.03 KG/M2 | HEIGHT: 70 IN | HEART RATE: 68 BPM | DIASTOLIC BLOOD PRESSURE: 74 MMHG

## 2023-10-13 DIAGNOSIS — I48.20 CHRONIC ATRIAL FIBRILLATION (HCC): ICD-10-CM

## 2023-10-13 DIAGNOSIS — G45.9 TIA (TRANSIENT ISCHEMIC ATTACK): Primary | ICD-10-CM

## 2023-10-13 PROCEDURE — 99495 TRANSJ CARE MGMT MOD F2F 14D: CPT | Performed by: INTERNAL MEDICINE

## 2023-10-13 PROCEDURE — 1111F DSCHRG MED/CURRENT MED MERGE: CPT | Performed by: INTERNAL MEDICINE

## 2023-10-13 PROCEDURE — 1126F AMNT PAIN NOTED NONE PRSNT: CPT | Performed by: INTERNAL MEDICINE

## 2023-10-13 NOTE — PATIENT INSTRUCTIONS
Please regularly take all prescribed medications daily. Please follow a consistent diet and follow-up regularly in the Anticoagulation Clinic. Continue current medications. Please schedule a Medicare physical soon when you can.

## 2023-12-06 RX ORDER — WARFARIN SODIUM 5 MG/1
5 TABLET ORAL NIGHTLY
Qty: 90 TABLET | Refills: 1 | Status: SHIPPED | OUTPATIENT
Start: 2023-12-06

## 2024-01-21 ENCOUNTER — APPOINTMENT (OUTPATIENT)
Dept: CT IMAGING | Facility: HOSPITAL | Age: 74
End: 2024-01-21
Attending: EMERGENCY MEDICINE
Payer: COMMERCIAL

## 2024-01-21 ENCOUNTER — HOSPITAL ENCOUNTER (EMERGENCY)
Facility: HOSPITAL | Age: 74
Discharge: HOME | End: 2024-01-21
Attending: EMERGENCY MEDICINE
Payer: COMMERCIAL

## 2024-01-21 VITALS
TEMPERATURE: 98 F | DIASTOLIC BLOOD PRESSURE: 96 MMHG | SYSTOLIC BLOOD PRESSURE: 149 MMHG | HEART RATE: 72 BPM | OXYGEN SATURATION: 95 % | RESPIRATION RATE: 19 BRPM

## 2024-01-21 DIAGNOSIS — S39.92XA LOWER BACK INJURY, INITIAL ENCOUNTER: ICD-10-CM
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
## 2024-01-21 DIAGNOSIS — S09.90XA INJURY OF HEAD, INITIAL ENCOUNTER: ICD-10-CM

## 2024-01-21 LAB
ANION GAP SERPL CALC-SCNC: 6 MMOL/L (ref 0–18)
BASOPHILS # BLD AUTO: 0.06 X10(3) UL (ref 0–0.2)
BASOPHILS NFR BLD AUTO: 0.9 %
BUN BLD-MCNC: 24 MG/DL (ref 9–23)
BUN/CREAT SERPL: 25.5 (ref 10–20)
CALCIUM BLD-MCNC: 9.3 MG/DL (ref 8.7–10.4)
CHLORIDE SERPL-SCNC: 106 MMOL/L (ref 98–112)
CO2 SERPL-SCNC: 24 MMOL/L (ref 21–32)
CREAT BLD-MCNC: 0.94 MG/DL
DEPRECATED RDW RBC AUTO: 45.3 FL (ref 35.1–46.3)
EGFRCR SERPLBLD CKD-EPI 2021: 85 ML/MIN/1.73M2 (ref 60–?)
EOSINOPHIL # BLD AUTO: 0.22 X10(3) UL (ref 0–0.7)
EOSINOPHIL NFR BLD AUTO: 3.4 %
ERYTHROCYTE [DISTWIDTH] IN BLOOD BY AUTOMATED COUNT: 13.2 % (ref 11–15)
GLUCOSE BLD-MCNC: 125 MG/DL (ref 70–99)
HCT VFR BLD AUTO: 46.8 %
HGB BLD-MCNC: 15.5 G/DL
IMM GRANULOCYTES # BLD AUTO: 0.01 X10(3) UL (ref 0–1)
IMM GRANULOCYTES NFR BLD: 0.2 %
INR BLD: 2.37 (ref 0.8–1.2)
LYMPHOCYTES # BLD AUTO: 1.24 X10(3) UL (ref 1–4)
LYMPHOCYTES NFR BLD AUTO: 19.2 %
MAGNESIUM SERPL-MCNC: 1.9 MG/DL (ref 1.6–2.6)
MCH RBC QN AUTO: 30.8 PG (ref 26–34)
MCHC RBC AUTO-ENTMCNC: 33.1 G/DL (ref 31–37)
MCV RBC AUTO: 93 FL
MONOCYTES # BLD AUTO: 0.65 X10(3) UL (ref 0.1–1)
MONOCYTES NFR BLD AUTO: 10.1 %
NEUTROPHILS # BLD AUTO: 4.28 X10 (3) UL (ref 1.5–7.7)
NEUTROPHILS # BLD AUTO: 4.28 X10(3) UL (ref 1.5–7.7)
NEUTROPHILS NFR BLD AUTO: 66.2 %
OSMOLALITY SERPL CALC.SUM OF ELEC: 288 MOSM/KG (ref 275–295)
PLATELET # BLD AUTO: 230 10(3)UL (ref 150–450)
POTASSIUM SERPL-SCNC: 4.1 MMOL/L (ref 3.5–5.1)
PROTHROMBIN TIME: 27.4 SECONDS (ref 11.6–14.8)
RBC # BLD AUTO: 5.03 X10(6)UL
SODIUM SERPL-SCNC: 136 MMOL/L (ref 136–145)
WBC # BLD AUTO: 6.5 X10(3) UL (ref 4–11)

## 2024-01-21 PROCEDURE — 99284 EMERGENCY DEPT VISIT MOD MDM: CPT

## 2024-01-21 PROCEDURE — 83735 ASSAY OF MAGNESIUM: CPT | Performed by: EMERGENCY MEDICINE

## 2024-01-21 PROCEDURE — 72125 CT NECK SPINE W/O DYE: CPT | Performed by: EMERGENCY MEDICINE

## 2024-01-21 PROCEDURE — 85610 PROTHROMBIN TIME: CPT | Performed by: EMERGENCY MEDICINE

## 2024-01-21 PROCEDURE — 99285 EMERGENCY DEPT VISIT HI MDM: CPT

## 2024-01-21 PROCEDURE — 80048 BASIC METABOLIC PNL TOTAL CA: CPT | Performed by: EMERGENCY MEDICINE

## 2024-01-21 PROCEDURE — 74176 CT ABD & PELVIS W/O CONTRAST: CPT | Performed by: EMERGENCY MEDICINE

## 2024-01-21 PROCEDURE — 36415 COLL VENOUS BLD VENIPUNCTURE: CPT

## 2024-01-21 PROCEDURE — 85025 COMPLETE CBC W/AUTO DIFF WBC: CPT | Performed by: EMERGENCY MEDICINE

## 2024-01-21 PROCEDURE — 70450 CT HEAD/BRAIN W/O DYE: CPT | Performed by: EMERGENCY MEDICINE

## 2024-01-21 RX ORDER — MORPHINE SULFATE 2 MG/ML
2 INJECTION, SOLUTION INTRAMUSCULAR; INTRAVENOUS ONCE
Status: DISCONTINUED | OUTPATIENT
Start: 2024-01-21 | End: 2024-01-21

## 2024-01-21 RX ORDER — DIAZEPAM 5 MG/ML
2.5 INJECTION, SOLUTION INTRAMUSCULAR; INTRAVENOUS ONCE
Status: DISCONTINUED | OUTPATIENT
Start: 2024-01-21 | End: 2024-01-21

## 2024-01-21 RX ORDER — HYDROCODONE BITARTRATE AND ACETAMINOPHEN 5; 325 MG/1; MG/1
1 TABLET ORAL EVERY 8 HOURS PRN
Qty: 9 TABLET | Refills: 0 | Status: SHIPPED | OUTPATIENT
Start: 2024-01-21 | End: 2024-01-24

## 2024-01-21 NOTE — ED QUICK NOTES
Patient cleared for discharge by provider. Belongings with patient. IV removed. Discharge instructions provided including when and how to follow up with health care provider and when to seek medical treatment. Medication use and prescriptions reviewed. Patient left ER in stable condition.
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
## 2024-01-24 ENCOUNTER — PATIENT OUTREACH (OUTPATIENT)
Dept: CASE MANAGEMENT | Age: 74
End: 2024-01-24

## 2024-05-13 ENCOUNTER — APPOINTMENT (OUTPATIENT)
Dept: CT IMAGING | Facility: HOSPITAL | Age: 74
DRG: 605 | End: 2024-05-13
Attending: EMERGENCY MEDICINE

## 2024-05-13 ENCOUNTER — HOSPITAL ENCOUNTER (INPATIENT)
Facility: HOSPITAL | Age: 74
LOS: 6 days | Discharge: HOME | DRG: 605 | End: 2024-05-20
Attending: EMERGENCY MEDICINE | Admitting: STUDENT IN AN ORGANIZED HEALTH CARE EDUCATION/TRAINING PROGRAM

## 2024-05-13 DIAGNOSIS — S30.1XXA ABDOMINAL WALL HEMATOMA, INITIAL ENCOUNTER: Primary | ICD-10-CM

## 2024-05-13 DIAGNOSIS — D68.9 COAGULOPATHY (HCC): ICD-10-CM

## 2024-05-13 PROBLEM — D64.9 ANEMIA: Status: ACTIVE | Noted: 2024-05-13

## 2024-05-13 PROBLEM — R73.9 HYPERGLYCEMIA: Status: ACTIVE | Noted: 2024-05-13

## 2024-05-13 PROBLEM — E87.1 HYPONATREMIA: Status: ACTIVE | Noted: 2024-05-13

## 2024-05-13 PROBLEM — D72.829 LEUKOCYTOSIS: Status: ACTIVE | Noted: 2024-05-13

## 2024-05-13 LAB
ALBUMIN SERPL-MCNC: 4 G/DL (ref 3.2–4.8)
ALP LIVER SERPL-CCNC: 85 U/L
ALT SERPL-CCNC: 55 U/L
ANION GAP SERPL CALC-SCNC: 7 MMOL/L (ref 0–18)
APTT PPP: 53.3 SECONDS (ref 23–36)
AST SERPL-CCNC: 48 U/L (ref ?–34)
BILIRUB DIRECT SERPL-MCNC: 1 MG/DL (ref ?–0.3)
BILIRUB SERPL-MCNC: 2.7 MG/DL (ref 0.2–1.1)
BILIRUB UR QL: NEGATIVE
BUN BLD-MCNC: 38 MG/DL (ref 9–23)
BUN/CREAT SERPL: 31.4 (ref 10–20)
CALCIUM BLD-MCNC: 8.7 MG/DL (ref 8.7–10.4)
CHLORIDE SERPL-SCNC: 96 MMOL/L (ref 98–112)
CO2 SERPL-SCNC: 24 MMOL/L (ref 21–32)
COLOR UR: YELLOW
CREAT BLD-MCNC: 1.21 MG/DL
EGFRCR SERPLBLD CKD-EPI 2021: 62 ML/MIN/1.73M2 (ref 60–?)
GLUCOSE BLD-MCNC: 134 MG/DL (ref 70–99)
GLUCOSE UR-MCNC: 30 MG/DL
HYALINE CASTS #/AREA URNS AUTO: PRESENT /LPF
INR BLD: 2.67 (ref 0.8–1.2)
KETONES UR-MCNC: NEGATIVE MG/DL
LEUKOCYTE ESTERASE UR QL STRIP.AUTO: NEGATIVE
LIPASE SERPL-CCNC: 34 U/L (ref 13–75)
NITRITE UR QL STRIP.AUTO: NEGATIVE
OSMOLALITY SERPL CALC.SUM OF ELEC: 275 MOSM/KG (ref 275–295)
PH UR: 5.5 [PH] (ref 5–8)
POTASSIUM SERPL-SCNC: 4.2 MMOL/L (ref 3.5–5.1)
PROT SERPL-MCNC: 7 G/DL (ref 5.7–8.2)
PROT UR-MCNC: 30 MG/DL
PROTHROMBIN TIME: 30.1 SECONDS (ref 11.6–14.8)
SODIUM SERPL-SCNC: 127 MMOL/L (ref 136–145)
SP GR UR STRIP: 1.03 (ref 1–1.03)
UROBILINOGEN UR STRIP-ACNC: 2

## 2024-05-13 PROCEDURE — 74177 CT ABD & PELVIS W/CONTRAST: CPT | Performed by: EMERGENCY MEDICINE

## 2024-05-14 PROBLEM — D68.9 COAGULOPATHY (HCC): Status: ACTIVE | Noted: 2024-05-14

## 2024-05-14 LAB
ANION GAP SERPL CALC-SCNC: 6 MMOL/L (ref 0–18)
ANTIBODY SCREEN: NEGATIVE
BASOPHILS # BLD AUTO: 0.02 X10(3) UL (ref 0–0.2)
BASOPHILS # BLD: 0 X10(3) UL (ref 0–0.2)
BASOPHILS NFR BLD AUTO: 0.1 %
BASOPHILS NFR BLD: 0 %
BUN BLD-MCNC: 35 MG/DL (ref 9–23)
BUN/CREAT SERPL: 32.7 (ref 10–20)
CALCIUM BLD-MCNC: 8.9 MG/DL (ref 8.7–10.4)
CHLORIDE SERPL-SCNC: 96 MMOL/L (ref 98–112)
CHLORIDE UR-SCNC: <20 MMOL/L
CO2 SERPL-SCNC: 26 MMOL/L (ref 21–32)
CREAT BLD-MCNC: 1.07 MG/DL
DEPRECATED RDW RBC AUTO: 43.1 FL (ref 35.1–46.3)
DEPRECATED RDW RBC AUTO: 44.1 FL (ref 35.1–46.3)
EGFRCR SERPLBLD CKD-EPI 2021: 72 ML/MIN/1.73M2 (ref 60–?)
EOSINOPHIL # BLD AUTO: 0.02 X10(3) UL (ref 0–0.7)
EOSINOPHIL # BLD: 0.14 X10(3) UL (ref 0–0.7)
EOSINOPHIL NFR BLD AUTO: 0.1 %
EOSINOPHIL NFR BLD: 1 %
ERYTHROCYTE [DISTWIDTH] IN BLOOD BY AUTOMATED COUNT: 13.2 % (ref 11–15)
ERYTHROCYTE [DISTWIDTH] IN BLOOD BY AUTOMATED COUNT: 13.2 % (ref 11–15)
GLUCOSE BLD-MCNC: 129 MG/DL (ref 70–99)
HCT VFR BLD AUTO: 25.8 %
HCT VFR BLD AUTO: 26.9 %
HGB BLD-MCNC: 8.6 G/DL
HGB BLD-MCNC: 9.3 G/DL
IMM GRANULOCYTES # BLD AUTO: 0.13 X10(3) UL (ref 0–1)
IMM GRANULOCYTES NFR BLD: 0.8 %
INR BLD: 2.69 (ref 0.8–1.2)
LYMPHOCYTES # BLD AUTO: 1.78 X10(3) UL (ref 1–4)
LYMPHOCYTES NFR BLD AUTO: 11.1 %
LYMPHOCYTES NFR BLD: 15 %
LYMPHOCYTES NFR BLD: 2.16 X10(3) UL (ref 1–4)
MCH RBC QN AUTO: 31.2 PG (ref 26–34)
MCH RBC QN AUTO: 31.8 PG (ref 26–34)
MCHC RBC AUTO-ENTMCNC: 33.3 G/DL (ref 31–37)
MCHC RBC AUTO-ENTMCNC: 34.6 G/DL (ref 31–37)
MCV RBC AUTO: 92.1 FL
MCV RBC AUTO: 93.5 FL
MONOCYTES # BLD AUTO: 2.36 X10(3) UL (ref 0.1–1)
MONOCYTES # BLD: 1.73 X10(3) UL (ref 0.1–1)
MONOCYTES NFR BLD AUTO: 14.8 %
MONOCYTES NFR BLD: 12 %
NEUTROPHILS # BLD AUTO: 10.01 X10 (3) UL (ref 1.5–7.7)
NEUTROPHILS # BLD AUTO: 11.68 X10 (3) UL (ref 1.5–7.7)
NEUTROPHILS # BLD AUTO: 11.68 X10(3) UL (ref 1.5–7.7)
NEUTROPHILS NFR BLD AUTO: 73.1 %
NEUTROPHILS NFR BLD: 71 %
NEUTS BAND NFR BLD: 1 %
NEUTS HYPERSEG # BLD: 10.37 X10(3) UL (ref 1.5–7.7)
OSMOLALITY SERPL CALC.SUM OF ELEC: 276 MOSM/KG (ref 275–295)
OSMOLALITY UR: 799 MOSM/KG (ref 300–1100)
PLATELET # BLD AUTO: 284 10(3)UL (ref 150–450)
PLATELET # BLD AUTO: 286 10(3)UL (ref 150–450)
PLATELET MORPHOLOGY: NORMAL
POTASSIUM SERPL-SCNC: 4.4 MMOL/L (ref 3.5–5.1)
PROTHROMBIN TIME: 30.2 SECONDS (ref 11.6–14.8)
Q-T INTERVAL: 410 MS
QRS DURATION: 144 MS
QTC CALCULATION (BEZET): 520 MS
R AXIS: 48 DEGREES
RBC # BLD AUTO: 2.76 X10(6)UL
RBC # BLD AUTO: 2.92 X10(6)UL
RH BLOOD TYPE: POSITIVE
SODIUM SERPL-SCNC: 128 MMOL/L (ref 136–145)
SODIUM SERPL-SCNC: <10 MMOL/L
T AXIS: -40 DEGREES
TOTAL CELLS COUNTED BLD: 100
VENTRICULAR RATE: 97 BPM
WBC # BLD AUTO: 14.4 X10(3) UL (ref 4–11)
WBC # BLD AUTO: 16 X10(3) UL (ref 4–11)

## 2024-05-14 PROCEDURE — 99223 1ST HOSP IP/OBS HIGH 75: CPT | Performed by: STUDENT IN AN ORGANIZED HEALTH CARE EDUCATION/TRAINING PROGRAM

## 2024-05-14 RX ORDER — BISACODYL 10 MG
10 SUPPOSITORY, RECTAL RECTAL
Status: DISCONTINUED | OUTPATIENT
Start: 2024-05-14 | End: 2024-05-20

## 2024-05-14 RX ORDER — ATORVASTATIN CALCIUM 20 MG/1
20 TABLET, FILM COATED ORAL DAILY
Status: DISCONTINUED | OUTPATIENT
Start: 2024-05-14 | End: 2024-05-20

## 2024-05-14 RX ORDER — ENEMA 19; 7 G/133ML; G/133ML
1 ENEMA RECTAL ONCE AS NEEDED
Status: DISCONTINUED | OUTPATIENT
Start: 2024-05-14 | End: 2024-05-20

## 2024-05-14 RX ORDER — SENNOSIDES 8.6 MG
17.2 TABLET ORAL NIGHTLY PRN
Status: DISCONTINUED | OUTPATIENT
Start: 2024-05-14 | End: 2024-05-20

## 2024-05-14 RX ORDER — POLYETHYLENE GLYCOL 3350 17 G/17G
17 POWDER, FOR SOLUTION ORAL DAILY PRN
Status: DISCONTINUED | OUTPATIENT
Start: 2024-05-14 | End: 2024-05-20

## 2024-05-14 RX ORDER — ACETAMINOPHEN 500 MG
500 TABLET ORAL EVERY 4 HOURS PRN
Status: DISCONTINUED | OUTPATIENT
Start: 2024-05-14 | End: 2024-05-20

## 2024-05-14 RX ORDER — SODIUM CHLORIDE 9 MG/ML
INJECTION, SOLUTION INTRAVENOUS CONTINUOUS
Status: ACTIVE | OUTPATIENT
Start: 2024-05-14 | End: 2024-05-14

## 2024-05-14 RX ORDER — ONDANSETRON 2 MG/ML
4 INJECTION INTRAMUSCULAR; INTRAVENOUS EVERY 6 HOURS PRN
Status: DISCONTINUED | OUTPATIENT
Start: 2024-05-14 | End: 2024-05-20

## 2024-05-14 NOTE — CONSULTS
[facility]  part of [facility]    Report of Consultation    Mohan Castañeda Patient Status:  Inpatient    1950 MRN [id number]   Location [facility] 5SW/SE Attending Tj Alvarado MD   Hosp Day # 0 PCP Alden Faulkner MD     Date of Admission:  2024  Date of Consult:  2024  Reason for Consultation:   Rectus sheath hematoma    History of Present Illness:   Patient is a 73 year old male who was admitted to the hospital for Abdominal wall hematoma, initial encounter:    He had been planning surgery with Dr. Joshua for a skin lesion in the right popliteal fossa on 2024. He has been on Coumadin and was advised by the [facility] (sees Dr. Carmichael for Afib and s/p MVR, and history of CVA)  to bridge with Lovenox 80 mg BID. The surgery was postponed due to possible stomach illness. However, he continued on both Coumadin and Lovenox.    4 days ago he began to note pain on the right side of the abdomen. Swelling developed and the pain worsened. He presented to an ICC and was referred to the ER. He has not had fevers, change in bowel habits, lightheadedness or dyspnea. CT in the ER showed a large rectus sheath hematoma.    He has a past medical history significant for CAD s/p CABG w/ LIMA graft to LAD, chronic atrial fibrillation on Coumadin, mitral regurgitation s/p mitral valve repair, HFrEF w/ EF 40-45%, embolic TIA, previous DVT, chronic back pain.      Past Medical History  Past Medical History:    Abdominal wall hematoma    Status post embolization right inferior epigastric artery pseudoaneurysm    Aortic atherosclerosis (HCC)    CT scan     ASHD (arteriosclerotic heart disease)    Cath 11-15 with EF 55%, 85% left main, s/p CABG ×1 with LIMA graft to LAD 11-15    Atrial fibrillation (HCC)    Back problem    BPH (benign prostatic hyperplasia)    Deep vein thrombosis (HCC)    Blood Clot passed through Left Eye    Dyslipidemia    Hemorrhoids    Long term  (current) use of anticoagulants    Mitral regurgitation    Severe, s/p mitral valve repair 11-15; Echo 10-23 with EF 45-50%, moderate SHERWIN, mild AI    Osteoarthritis    lower back    Prostate cancer (HCC)    Asim's grade 3+3; PSA 8.55    SVT (supraventricular tachycardia) (HCC)    TIA (transient ischemic attack)    Transient vision loss left eye; INR subtherapeutic; MRA carotids with possible narrowing right vertebral artery origin; Echo with EF 45-50%, moderate SHERWIN, mild AI       Past Surgical History  Past Surgical History:   Procedure Laterality Date    Cabg  2015    CABG ×1 with LIMA graft to LAD and mitral valve repair with ligation left atrial appendage    Hemorrhoidectomy      Other      Excision benign calcified nodules right shoulder    Other      Nasal fracture    Other  2019    Embolization right inferior epigastric artery pseudoaneurysm    Other surgical history Right     Right Upper chest/axilla region lysis of adhesions       Family History  Family History   Problem Relation Age of Onset    Other (CVA [Other]) Mother 56         age 56    Breast Cancer Mother     Dementia Father     Prostate Cancer Father     Diabetes Father     Hypertension Brother        Social History  Social History     Socioeconomic History    Marital status: Single   Tobacco Use    Smoking status: Former     Current packs/day: 0.00     Average packs/day: 1.5 packs/day for 15.0 years (22.5 ttl pk-yrs)     Types: Cigarettes     Start date: 1967     Quit date: 1982     Years since quittin.1    Smokeless tobacco: Never   Vaping Use    Vaping status: Never Used   Substance and Sexual Activity    Alcohol use: No     Comment: Not since     Drug use: Not Currently   Other Topics Concern    Caffeine Concern Yes     Comment: Tea     Social Determinants of Health     Financial Resource Strain: Low Risk  (10/4/2023)    Financial Resource Strain     Difficulty of Paying Living Expenses: Not very  hard     Med Affordability: No   Food Insecurity: No Food Insecurity (5/14/2024)    Food Insecurity     Food Insecurity: Never true   Transportation Needs: No Transportation Needs (5/14/2024)    Transportation Needs     Lack of Transportation: No   Housing Stability: Low Risk  (5/14/2024)    Housing Stability     Housing Instability: No           Current Medications:  Current Facility-Administered Medications   Medication Dose Route Frequency    atorvastatin (Lipitor) tab 20 mg  20 mg Oral Daily    metoprolol tartrate (Lopressor) tab 25 mg  25 mg Oral BID    ondansetron (Zofran) 4 MG/2ML injection 4 mg  4 mg Intravenous Q6H PRN    acetaminophen (Tylenol Extra Strength) tab 500 mg  500 mg Oral Q4H PRN    polyethylene glycol (PEG 3350) (Miralax) 17 g oral packet 17 g  17 g Oral Daily PRN    sennosides (Senokot) tab 17.2 mg  17.2 mg Oral Nightly PRN    bisacodyl (Dulcolax) 10 MG rectal suppository 10 mg  10 mg Rectal Daily PRN    fleet enema (Fleet) 7-19 GM/118ML rectal enema 133 mL  1 enema Rectal Once PRN     Medications Prior to Admission   Medication Sig    warfarin 5 MG Oral Tab Take 1 tablet (5 mg total) by mouth nightly.    Ergocalciferol (VITAMIN D OR) Take 1,000 Units by mouth daily.      aspirin 81 MG Oral Tab EC Take 1 tablet (81 mg total) by mouth at bedtime.    Saw Palmetto, Serenoa repens, (SAW PALMETTO OR) Take 1 capsule by mouth daily.    NETTLE-PYGEUM AFRICANUM OR Take 1 capsule by mouth daily.    Cyanocobalamin (VITAMIN B-12 OR) Take by mouth daily.    metoprolol tartrate 25 MG Oral Tab Take 1 tablet (25 mg total) by mouth 2 (two) times daily.    ATORVASTATIN 20 MG Oral Tab Take 1 tablet by mouth once daily    Warfarin Sodium 2 MG Oral Tab Take 1 tablet (2 mg total) by mouth nightly. No coumadin tonight 12/31. Start taking on Wednesday 1/1. INR on Friday 1/3, call MD for instructions on dose and the timing of the next INR (Patient taking differently: Take 2.5 mg by mouth nightly. No coumadin tonight  12/31. Start taking on Wednesday 1/1. INR on Friday 1/3, call MD for instructions on dose and the timing of the next INR)       Allergies  No Known Allergies    Review of Systems:   Pertinent items are noted in HPI.    Physical Exam:      /70 (BP Location: Right arm)   Pulse 104   Temp 98.1 °F (36.7 °C) (Oral)   Resp 18   Ht 5' 10\" (1.778 m)   Wt 199 lb 9.6 oz (90.5 kg)   SpO2 95%   BMI 28.64 kg/m²    Body mass index is 28.64 kg/m².    I/O last 3 completed shifts:  In: 30 [P.O.:30]  Out: -     CONSTITUTIONAL:  awake, alert, cooperative, no apparent distress, and appears stated age  EYES:  Lids and lashes normal, sclera clear, conjunctiva normal  ENT:  Normocephalic, without obvious abnormality, atraumatic  NECK:  Supple, symmetrical, trachea midline, no adenopathy, thyroid symmetric, not enlarged and no tenderness  HEMATOLOGIC/LYMPHATICS:  No cervical lymphadenopathy, no supraclavicular lymphadenopathy, no inguinal lymphadenopathy  LUNGS:  No increased work of breathing, good air exchange, clear to auscultation bilaterally, no crackles or wheezing  CARDIOVASCULAR:  Normal apical impulse, regular rate and rhythm, and no murmur noted, no pedal edema  ABDOMEN:    Large, firm, tubular mass over length of right rectus muscle  No scars, extensive ecchymosis  normal bowel sounds, soft, non-distended, non-tender, no masses palpated, no hepatosplenomegaly      MUSCULOSKELETAL: Full range of motion noted.  Motor strength is 5 out of 5 all extremities bilaterally.   SKIN:  no rashes and no jaundice  PSYCHIATRIC:       Orientation:  normal     Appearance:  normal     Behavior:  normal     Attitude toward examiner:  normal     Affect: normal     Judgment:  Normal         Results:     Laboratory Data:  Lab Results   Component Value Date    WBC 14.4 (H) 05/14/2024    HGB 8.6 (L) 05/14/2024    HCT 25.8 (L) 05/14/2024    .0 05/14/2024    CREATSERUM 1.07 05/14/2024    BUN 35 (H) 05/14/2024     (L) 05/14/2024     K 4.4 05/14/2024    CL 96 (L) 05/14/2024    CO2 26.0 05/14/2024     (H) 05/14/2024    CA 8.9 05/14/2024    ALB 4.0 05/13/2024    ALKPHO 85 05/13/2024    TP 7.0 05/13/2024    AST 48 (H) 05/13/2024    ALT 55 (H) 05/13/2024    PTT 53.3 (H) 05/13/2024    INR 2.69 (H) 05/14/2024    PTP 30.2 (H) 05/14/2024    TSH 1.54 04/24/2018    LIP 34 05/13/2024    PSA 7.63 (H) 05/15/2023    DDIMER 0.99 (H) 04/22/2018    MG 1.9 01/21/2024    PHOS 3.4 12/31/2019    TROP 0.00 04/22/2018    B12 338 04/24/2018         Imaging:  CT ABDOMEN+PELVIS(CONTRAST ONLY)(CPT=74177)    Result Date: 5/14/2024  CONCLUSION:   17.5 cm subacute right rectus sheath hematoma.  Prominent branches of the inferior epigastric artery seen along the superior and mid aspect of the hematoma without definite contrast extravasation.  Prior ligation of the right inferior epigastric artery.  Opacification is still seen involving the inferior epigastric artery proximal and distal to the ligation clips.  Multiple other incidental findings as described in the body of the report.  Preliminary report was submitted by the [facility] teleradiologist and there are no significant discrepancies.  el-remote     Dictated by (CST): Rao Jackson MD on 5/14/2024 at 9:38 AM     Finalized by (CST): Rao Jackson MD on 5/14/2024 at 9:48 AM              Impression:     Abdominal wall hematoma, initial encounter  Right rectus sheath  Related to double anticoagulation (patient continued bridging Lovenox and primary Coumadin therapy)  Symptoms of pain and abdominal wall swelling      Anemia  Acute blood loss anemia  Hg 8.6  Not symptomatic      Coagulopathy (HCC)  On chronic anticoagulation      Recommendations:  Reverse anticoagulation  Monitor Hg  Surgical intervention not advised as the hematoma is contained in the rectus sheath and surgery would release the tamponade which is preventing further bleeding.  OK to resume diet      Thank you for allowing me to participate in the care  of your patient.    Kevin Kumar MD    5/14/2024     other

## 2024-05-14 NOTE — PLAN OF CARE
Problem: CARDIOVASCULAR - ADULT  Goal: Maintains optimal cardiac output and hemodynamic stability  Description: INTERVENTIONS:  - Monitor vital signs, rhythm, and trends  - Monitor for bleeding, hypotension and signs of decreased cardiac output  - Evaluate effectiveness of vasoactive medications to optimize hemodynamic stability  - Monitor arterial and/or venous puncture sites for bleeding and/or hematoma  - Assess quality of pulses, skin color and temperature  - Assess for signs of decreased coronary artery perfusion - ex. Angina  - Evaluate fluid balance, assess for edema, trend weights  Outcome: Progressing  Goal: Absence of cardiac arrhythmias or at baseline  Description: INTERVENTIONS:  - Continuous cardiac monitoring, monitor vital signs, obtain 12 lead EKG if indicated  - Evaluate effectiveness of antiarrhythmic and heart rate control medications as ordered  - Initiate emergency measures for life threatening arrhythmias  - Monitor electrolytes and administer replacement therapy as ordered  Outcome: Progressing     Problem: SKIN/TISSUE INTEGRITY - ADULT  Goal: Skin integrity remains intact  Description: INTERVENTIONS  - Assess and document risk factors for pressure ulcer development  - Assess and document skin integrity  - Monitor for areas of redness and/or skin breakdown  - Initiate interventions, skin care algorithm/standards of care as needed  Outcome: Progressing

## 2024-05-14 NOTE — ED PROVIDER NOTES
Patient Seen in: [facility] Emergency Department      History     Chief Complaint   Patient presents with    Abdominal Pain     Stated Complaint: abd pain    Subjective:   HPI    Patient presents emergency department complaint of several days of diffuse abdominal pain and swelling.  He denies fever or chills.  There is no vomiting.  He went to an immediate care center where they evaluated his abdomen and felt that it was necessary to get advanced imaging and blood work.  He states that he is been giving himself Lovenox injections to supplement his Coumadin in anticipation of surgery was can be performed on the back of his leg.  This was canceled last week but he continued taking both Coumadin and Lovenox.    Objective:   Past Medical History:    Abdominal wall hematoma    Status post embolization right inferior epigastric artery pseudoaneurysm    Aortic atherosclerosis (HCC)    CT scan 12-19    ASHD (arteriosclerotic heart disease)    Cath 11-15 with EF 55%, 85% left main, s/p CABG ×1 with LIMA graft to LAD 11-15    Atrial fibrillation (HCC)    Back problem    BPH (benign prostatic hyperplasia)    Deep vein thrombosis (HCC)    Blood Clot passed through Left Eye    Dyslipidemia    Hemorrhoids    Long term (current) use of anticoagulants    Mitral regurgitation    Severe, s/p mitral valve repair 11-15; Echo 10-23 with EF 45-50%, moderate SHERWIN, mild AI    Osteoarthritis    lower back    Prostate cancer (HCC)    Galena's grade 3+3; PSA 8.55    SVT (supraventricular tachycardia) (HCC)    TIA (transient ischemic attack)    Transient vision loss left eye; INR subtherapeutic; MRA carotids with possible narrowing right vertebral artery origin; Echo with EF 45-50%, moderate SHERWIN, mild AI              Past Surgical History:   Procedure Laterality Date    Cabg  11/2015    CABG ×1 with LIMA graft to LAD and mitral valve repair with ligation left atrial appendage    Hemorrhoidectomy  1989    Other  1979    Excision benign  calcified nodules right shoulder    Other  2000    Nasal fracture    Other  2019    Embolization right inferior epigastric artery pseudoaneurysm    Other surgical history Right     Right Upper chest/axilla region lysis of adhesions                Social History     Socioeconomic History    Marital status: Single   Tobacco Use    Smoking status: Former     Current packs/day: 0.00     Average packs/day: 1.5 packs/day for 15.0 years (22.5 ttl pk-yrs)     Types: Cigarettes     Start date: 1967     Quit date: 1982     Years since quittin.1    Smokeless tobacco: Never   Vaping Use    Vaping status: Never Used   Substance and Sexual Activity    Alcohol use: No     Comment: Not since     Drug use: Not Currently   Other Topics Concern    Caffeine Concern Yes     Comment: Tea     Social Determinants of Health     Financial Resource Strain: Low Risk  (10/4/2023)    Financial Resource Strain     Difficulty of Paying Living Expenses: Not very hard     Med Affordability: No   Transportation Needs: No Transportation Needs (10/4/2023)    Transportation Needs     Lack of Transportation: No              Review of Systems    Positive for stated complaint: abd pain  Other systems are as noted in HPI.  Constitutional and vital signs reviewed.      All other systems reviewed and negative except as noted above.    Physical Exam     ED Triage Vitals [24]   /73   Pulse 105   Resp 18   Temp 99.4 °F (37.4 °C)   Temp src Temporal   SpO2 97 %   O2 Device None (Room air)       Current Vitals:   Vital Signs  BP: 121/79  Pulse: 98  Resp: 18  Temp: 99.4 °F (37.4 °C)  Temp src: Temporal  MAP (mmHg): 89    Oxygen Therapy  SpO2: 97 %  O2 Device: None (Room air)            Physical Exam  Vitals and nursing note reviewed.   Constitutional:       General: He is not in acute distress.     Appearance: He is well-developed.   HENT:      Head: Normocephalic.      Nose: Nose normal.      Mouth/Throat:      Mouth:  Mucous membranes are moist.   Eyes:      Conjunctiva/sclera: Conjunctivae normal.   Cardiovascular:      Rate and Rhythm: Normal rate and regular rhythm.      Heart sounds: No murmur heard.  Pulmonary:      Effort: Pulmonary effort is normal. No respiratory distress.      Breath sounds: Normal breath sounds.   Abdominal:      General: There is distension.      Tenderness: There is abdominal tenderness.      Comments: The abdomen is firm, distended, diffusely tender to palpation grossly ecchymotic with palpable abdominal wall hematomas.   Musculoskeletal:         General: No tenderness. Normal range of motion.      Cervical back: Normal range of motion and neck supple.   Skin:     General: Skin is warm and dry.      Capillary Refill: Capillary refill takes less than 2 seconds.      Findings: No rash.   Neurological:      General: No focal deficit present.      Mental Status: He is alert and oriented to person, place, and time.               ED Course     Labs Reviewed   BASIC METABOLIC PANEL (8) - Abnormal; Notable for the following components:       Result Value    Glucose 134 (*)     Sodium 127 (*)     Chloride 96 (*)     BUN 38 (*)     BUN/CREA Ratio 31.4 (*)     All other components within normal limits   HEPATIC FUNCTION PANEL (7) - Abnormal; Notable for the following components:    AST 48 (*)     ALT 55 (*)     Bilirubin, Total 2.7 (*)     Bilirubin, Direct 1.0 (*)     All other components within normal limits   URINALYSIS, ROUTINE - Abnormal; Notable for the following components:    Clarity Urine Turbid (*)     Glucose Urine 30 (*)     Blood Urine 1+ (*)     Protein Urine 30 (*)     Urobilinogen Urine 2 (*)     Bacteria Urine Rare (*)     Squamous Epi. Cells Few (*)     Hyaline Casts Present (*)     All other components within normal limits   PROTHROMBIN TIME (PT) - Abnormal; Notable for the following components:    PT 30.1 (*)     INR 2.67 (*)     All other components within normal limits   PTT, ACTIVATED -  Abnormal; Notable for the following components:    PTT 53.3 (*)     All other components within normal limits   CBC W/ DIFFERENTIAL - Abnormal; Notable for the following components:    WBC 16.0 (*)     RBC 2.92 (*)     HGB 9.3 (*)     HCT 26.9 (*)     Neutrophil Absolute Prelim 11.68 (*)     All other components within normal limits   LIPASE - Normal   CBC WITH DIFFERENTIAL WITH PLATELET    Narrative:     The following orders were created for panel order CBC With Differential With Platelet.  Procedure                               Abnormality         Status                     ---------                               -----------         ------                     CBC W/ DIFFERENTIAL[[id number]]          Abnormal            Preliminary result           Please view results for these tests on the individual orders.   SCAN SLIDE   MD BLOOD SMEAR CONSULT                      [facility]        Admission disposition: 5/14/2024 12:04 AM                         Medical Decision Making  Differential diagnosis considered for rectus sheath hematoma, subcutaneous bruising, intra-abdominal bleed.    Problems Addressed:  Abdominal wall hematoma, initial encounter: acute illness or injury  Coagulopathy (HCC): acute illness or injury    Amount and/or Complexity of Data Reviewed  External Data Reviewed: labs.     Details: Laboratory studies reviewed to trend hemoglobin which is normally 15-16.  Today 9.  Labs: ordered. Decision-making details documented in ED Course.     Details: Elevated INR.  Hemoglobin has dropped to 6 g in the last few months.  Radiology: ordered and independent interpretation performed. Decision-making details documented in ED Course.     Details: Large rectus sheath hematoma on the right side of the abdomen  Discussion of management or test interpretation with external provider(s): Notified the hospitalist, general surgery as well as cardiology.  Will hold Lovenox.    Risk  Decision regarding  hospitalization.        Disposition and Plan     Clinical Impression:  1. Abdominal wall hematoma, initial encounter    2. Coagulopathy (HCC)         Disposition:  Admit  5/14/2024 12:04 am    Follow-up:  No follow-up provider specified.  We recommend that you schedule follow up care with a primary care provider within the next three months to obtain basic health screening including reassessment of your blood pressure.      Medications Prescribed:  Current Discharge Medication List                            Hospital Problems       Present on Admission  Date Reviewed: 10/13/2023            ICD-10-CM Noted POA    * (Principal) Abdominal wall hematoma, initial encounter S30.1XXA 12/24/2019 Unknown    Anemia D64.9 5/13/2024 Yes    Hyperglycemia R73.9 5/13/2024 Yes    Hyponatremia E87.1 5/13/2024 Yes    Leukocytosis D72.829 5/13/2024 Yes

## 2024-05-14 NOTE — ED INITIAL ASSESSMENT (HPI)
Pt arrives through triage with complaints of abd pain that started a bout a week ago. Abdomen is distention and bruising noted from his \"blood thinner shots\". +Nausea  Last BM today      Blood thinners  Hx of mitral valve replacement 2015, cabg,

## 2024-05-14 NOTE — CONSULTS
Cardiology Consultation  [facility]    Mohan Castañeda Patient Status:  Inpatient    1950 MRN [id number]   Location [facility] 5SW/SE Attending Tj Alvarado MD   Hosp Day # 0 PCP Alden Faulkner MD     Reason for Consultation:  Regarding anticoagulation.    History of Present Illness:  Mohan Castañeda is a a(n) 73 year old male PMH of CAD s/p CABG, chronic Afib on coumadin, MR s/p mitral valve repair, HFrEF, hx of previous DVT and hx of TIA who presents to ED on 24 with abodminal pain. Symptoms have been ongoing for ~5 days. Patient has been giving himself Lovenox injections to supplement his Coumadin in anticipation for right leg surgery that was canceled last week. Started noticing abdominal pain, swelling and bruising. Denies any chest pain, SOB or palpitations.   In ED, pt was hemodynamically stable. Hgb noted to down to 9 (from baseline of 15). CT abdomen showed large rectus sheath hematoma. Admitted for further evaluation.     Currently, pt reports doing ok. Denies any chest pain, SOB or palpitations. Remains hemodynamically stable.     History:  Past Medical History:    Abdominal wall hematoma    Status post embolization right inferior epigastric artery pseudoaneurysm    Aortic atherosclerosis (HCC)    CT scan -    ASHD (arteriosclerotic heart disease)    Cath 11-15 with EF 55%, 85% left main, s/p CABG ×1 with LIMA graft to LAD 11-15    Atrial fibrillation (HCC)    Back problem    BPH (benign prostatic hyperplasia)    Deep vein thrombosis (HCC)    Blood Clot passed through Left Eye    Dyslipidemia    Hemorrhoids    Long term (current) use of anticoagulants    Mitral regurgitation    Severe, s/p mitral valve repair 11-15; Echo 10-23 with EF 45-50%, moderate SHERWIN, mild AI    Osteoarthritis    lower back    Prostate cancer (HCC)    Asim's grade 3+3; PSA 8.55    SVT (supraventricular tachycardia) (HCC)    TIA (transient ischemic attack)    Transient vision loss left  eye; INR subtherapeutic; MRA carotids with possible narrowing right vertebral artery origin; Echo with EF 45-50%, moderate SHERWIN, mild AI     Past Surgical History:   Procedure Laterality Date    Cabg  2015    CABG ×1 with LIMA graft to LAD and mitral valve repair with ligation left atrial appendage    Hemorrhoidectomy      Other      Excision benign calcified nodules right shoulder    Other  2000    Nasal fracture    Other  2019    Embolization right inferior epigastric artery pseudoaneurysm    Other surgical history Right     Right Upper chest/axilla region lysis of adhesions     Family History   Problem Relation Age of Onset    Other (CVA [Other]) Mother 56         age 56    Breast Cancer Mother     Dementia Father     Prostate Cancer Father     Diabetes Father     Hypertension Brother       reports that he quit smoking about 42 years ago. His smoking use included cigarettes. He started smoking about 57 years ago. He has a 22.5 pack-year smoking history. He has never used smokeless tobacco. He reports that he does not currently use drugs. He reports that he does not drink alcohol.    Allergies:  No Known Allergies    Medications:  No current facility-administered medications on file prior to encounter.     Current Outpatient Medications on File Prior to Encounter   Medication Sig Dispense Refill    warfarin 5 MG Oral Tab Take 1 tablet (5 mg total) by mouth nightly. 90 tablet 1    Ergocalciferol (VITAMIN D OR) Take 1,000 Units by mouth daily.        aspirin 81 MG Oral Tab EC Take 1 tablet (81 mg total) by mouth at bedtime.      Saw Palmetto, Serenoa repens, (SAW PALMETTO OR) Take 1 capsule by mouth daily.      NETTLE-PYGEUM AFRICANUM OR Take 1 capsule by mouth daily.      Cyanocobalamin (VITAMIN B-12 OR) Take by mouth daily.      metoprolol tartrate 25 MG Oral Tab Take 1 tablet (25 mg total) by mouth 2 (two) times daily. 180 tablet 1    ATORVASTATIN 20 MG Oral Tab Take 1 tablet by mouth once  daily 90 tablet 0    Warfarin Sodium 2 MG Oral Tab Take 1 tablet (2 mg total) by mouth nightly. No coumadin tonight 12/31. Start taking on Wednesday 1/1. INR on Friday 1/3, call MD for instructions on dose and the timing of the next INR (Patient taking differently: Take 2.5 mg by mouth nightly. No coumadin tonight 12/31. Start taking on Wednesday 1/1. INR on Friday 1/3, call MD for instructions on dose and the timing of the next INR) 30 tablet 0       Review of Systems:  Constitutional: denies fevers, chills, night sweats  HEENT: denies headache, vision changes, trouble or pain with swallowing  Cardiac: denies chest pain, palpitations, edema  Pulm: denies dyspnea, cough, wheeze  GI: denies n/v, abd pain, diarrhea or constipation  : denies hematuria, dysuria, incontinence  MSK: denies muscle or joint pains  Neuro: denies numbness, weakness, paresthesias  Psych: denies anxiety, depression  Integument: denies skin rashes or lesions  Heme: denies easy bruising or bleeding  Endo: denies heat/cold intolerance, skin or nail changes      Physical Exam:  Blood pressure 137/71, pulse 107, temperature 97.5 °F (36.4 °C), temperature source Oral, resp. rate 18, height 5' 10\" (1.778 m), weight 199 lb 9.6 oz (90.5 kg), SpO2 95%.  Wt Readings from Last 3 Encounters:   05/14/24 199 lb 9.6 oz (90.5 kg)   05/07/24 195 lb (88.5 kg)   10/13/23 195 lb 12.8 oz (88.8 kg)       General: awake, alert, oriented x 3, no acute distress  HEENT: at/nc, perrl, eomi  Neck: No JVD, carotids 2+ no bruits.  Cardiac: Regular rate and rhythm, S1, S2 normal, no murmur, rub or gallop.  Lungs: Clear without wheezes, rales, rhonchi or dullness.  Normal excursions and effort.  Abdomen: Soft, non-tender, non-distended, normal bowel sounds   Extremities: Without clubbing, cyanosis or edema.  Peripheral pulses are 2+.  Neurologic: Alert and oriented, normal affect.  Psych: normal mood and affect  Skin: Warm and dry.       Laboratories and  Data:  Diagnostics:  EK24  Afib  with PVC, RBBB    Echo: 10/3/23  Conclusions:     1. Left ventricle: The cavity size was normal. Wall thickness was normal. Systolic function was mildly reduced. The estimated ejection fraction was 45-50%, by biplane method of disks. Technical limitations of the study preclude regional wall motion analysis. Unable to assess LV diastolic function due to heart rhythm.   2. Right ventricle: The cavity size was mildly increased. Systolic function was mildly reduced.   3. Left atrium: The atrium was moderately dilated.   4. Right atrium: The atrium was moderately dilated.   5. Aortic valve: There was mild regurgitation.     Stress Test: 10/16/20  Summary:   1. Stress echo: There is no evidence for stress-induced ischemia.   2. Stress ECG conclusions: The stress ECG is negative for ischemia.       Labs:   CBC:    Lab Results   Component Value Date    WBC 14.4 (H) 2024    WBC 16.0 (H) 2024    WBC 6.5 2024     Lab Results   Component Value Date    HGB 8.6 (L) 2024    HGB 9.3 (L) 2024    HGB 15.5 2024      Lab Results   Component Value Date    .0 2024    .0 2024    .0 2024     BMP:   No results found for: \"GLUCOSE\"  Lab Results   Component Value Date    K 4.4 2024    K 4.2 2024    K 4.1 2024     Lab Results   Component Value Date    BUN 35 (H) 2024    BUN 38 (H) 2024    BUN 24 (H) 2024     Lab Results   Component Value Date    CREATSERUM 1.07 2024    CREATSERUM 1.21 2024    CREATSERUM 0.94 2024     Cholesterol:     Lab Results   Component Value Date    CHOLEST 148 10/02/2023    CHOLEST 149 2022    CHOLEST 142.00 2021     Lab Results   Component Value Date    HDL 56 10/02/2023    HDL 60 (H) 2022    HDL 54 2021     Lab Results   Component Value Date    TRIG 71 10/02/2023    TRIG 58 2022    TRIG 60.00 2021     Lab Results    Component Value Date    LDL 78 10/02/2023    LDL 77 04/04/2022    LDL 76 09/30/2021     Lab Results   Component Value Date    AST 48 (H) 05/13/2024    AST 26 10/02/2023    AST 18 04/04/2022     Lab Results   Component Value Date    ALT 55 (H) 05/13/2024    ALT 34 10/02/2023    ALT 28 04/04/2022       Assessment/Plan:    # Large rectus sheath hematoma: 15 x 11 x 17 cm. No active bleeding.   # Permanent afib - CHADs-VASC score 7  # HFrEF - last EF 40-45%  # CAD s/p CABG x 1v (LIMA to LAD)  # Hx of MR s/p Mitral valve repair   # Hx of TIA  # HLD     Plan:   - Given large rectus sheath hematoma and significant drop in his hemoglobin, will hold anticoagulation until evaluation from general surgery. Usually rectus sheath hematoma will tamponade on its own, and will resolve overtime.    - Discussed stroke risk per patient but given the high bleeding risk at this time, risks outweighs the benefits of being on anticoagulation at this time.    - Will restart once OK per general surgery/    - Once time to resume, will recommend starting heparin gtt and observe. Would defer lovenox.   - Continue metoprolol 25mg BID  - Continue atorvastatin 20mg daily   - Monitor telemetry     Cardiology will follow. Please call us with any questions.     Gumaro Vegas DO  Cardiologist, [facility]  5/14/2024  9:31 AM

## 2024-05-14 NOTE — H&P
[facility]  part of [facility]    History & Physical    Mohan Castañeda Patient Status:  Inpatient    1950 MRN [id number]   Location [facility] 5SW/SE Attending April Farrell MD   Hosp Day # 0 PCP Alden Faulkner MD     Date:  2024  Date of Admission:  2024    Chief Complaint:  Chief Complaint   Patient presents with    Abdominal Pain       History of Present Illness:  Mohan Castañeda is a(n) 73 year old male, who presents for evaluation of worsening abdominal pain associated with swelling. PMHx significant for CAD s/p CABG w/ LIMA graft to LAD, chronic atrial fibrillation on Coumadin, mitral regurgitation s/p mitral valve repair, HFrEF w/ EF 40-45%, embolic TIA, previous DVT, chronic back pain.  Patient reports that he has been having abdominal pain and swelling for about 5 days.  Patient has been giving himself Lovenox injections to supplement his Coumadin in anticipation for right leg surgery that was canceled last week.  He continues to take his Coumadin and Lovenox.  He then noticed discoloration of his abdomen as well as worsening swelling.  He denies any nausea or vomiting.  Denies any chest pain or shortness of breath.  On presentation to the ED, initial vital signs reveal temp 99.4, heart rate 105, respiratory rate 18, blood pressure 115/73.  Lab work remarkable for sodium level 127, chloride 96, elevated WBC 16.0.  Hemoglobin noted to be 9.  CT abdomen/pelvis reveals a large 15 x 11 x 17 cm right rectus sheath hematoma, no active bleeding identified, no hemoperitoneum or free air.  Patient was admitted under hospitalist service with consultation to general surgery as well as cardiology.    History:  Past Medical History:    Abdominal wall hematoma    Status post embolization right inferior epigastric artery pseudoaneurysm    Aortic atherosclerosis (HCC)    CT scan -    ASHD (arteriosclerotic heart disease)    Cath -15 with EF 55%, 85% left main,  s/p CABG ×1 with LIMA graft to LAD 11-15    Atrial fibrillation (HCC)    Back problem    BPH (benign prostatic hyperplasia)    Deep vein thrombosis (HCC)    Blood Clot passed through Left Eye    Dyslipidemia    Hemorrhoids    Long term (current) use of anticoagulants    Mitral regurgitation    Severe, s/p mitral valve repair 11-15; Echo 10-23 with EF 45-50%, moderate SHERWIN, mild AI    Osteoarthritis    lower back    Prostate cancer (HCC)    Asim's grade 3+3; PSA 8.55    SVT (supraventricular tachycardia) (HCC)    TIA (transient ischemic attack)    Transient vision loss left eye; INR subtherapeutic; MRA carotids with possible narrowing right vertebral artery origin; Echo with EF 45-50%, moderate SHERWIN, mild AI     Past Surgical History:   Procedure Laterality Date    Cabg  2015    CABG ×1 with LIMA graft to LAD and mitral valve repair with ligation left atrial appendage    Hemorrhoidectomy      Other      Excision benign calcified nodules right shoulder    Other      Nasal fracture    Other  2019    Embolization right inferior epigastric artery pseudoaneurysm    Other surgical history Right     Right Upper chest/axilla region lysis of adhesions     Family History   Problem Relation Age of Onset    Other (CVA [Other]) Mother 56         age 56    Breast Cancer Mother     Dementia Father     Prostate Cancer Father     Diabetes Father     Hypertension Brother       reports that he quit smoking about 42 years ago. His smoking use included cigarettes. He started smoking about 57 years ago. He has a 22.5 pack-year smoking history. He has never used smokeless tobacco. He reports that he does not currently use drugs. He reports that he does not drink alcohol.    Allergies:  No Known Allergies    Home Medications:  Prior to Admission Medications   Prescriptions Last Dose Informant Patient Reported? Taking?   ATORVASTATIN 20 MG Oral Tab   No No   Sig: Take 1 tablet by mouth once daily   Cyanocobalamin  (VITAMIN B-12 OR)   Yes No   Sig: Take by mouth daily.   Ergocalciferol (VITAMIN D OR)   Yes No   Sig: Take 1,000 Units by mouth daily.     NETTLE-PYGEUM AFRICANUM OR   Yes No   Sig: Take 1 capsule by mouth daily.   Saw Palmetto, Serenoa repens, (SAW PALMETTO OR)   Yes No   Sig: Take 1 capsule by mouth daily.   Warfarin Sodium 2 MG Oral Tab   No No   Sig: Take 1 tablet (2 mg total) by mouth nightly. No coumadin tonight 12/31. Start taking on Wednesday 1/1. INR on Friday 1/3, call MD for instructions on dose and the timing of the next INR   Patient taking differently: Take 2.5 mg by mouth nightly. No coumadin tonight 12/31. Start taking on Wednesday 1/1. INR on Friday 1/3, call MD for instructions on dose and the timing of the next INR   aspirin 81 MG Oral Tab EC   Yes No   Sig: Take 1 tablet (81 mg total) by mouth at bedtime.   metoprolol tartrate 25 MG Oral Tab   No No   Sig: Take 1 tablet (25 mg total) by mouth 2 (two) times daily.   warfarin 5 MG Oral Tab   No No   Sig: Take 1 tablet (5 mg total) by mouth nightly.      Facility-Administered Medications: None       Review of Systems:  Constitutional: Negative  Eye:  Negative.  Ear/Nose/Mouth/Throat:  Negative.  Respiratory:  Negative  Cardiovascular: Negative  Gastrointestinal:  + Abdominal pain  Genitourinary:  Negative  Endocrine:  Negative.  Immunologic:  Negative.  Musculoskeletal:  Negative.  Integumentary:  Negative.  Neurologic:  Negative.  Psychiatric:  Negative.  ROS reviewed as documented in chart    Physical Exam:  Temp:  [98 °F (36.7 °C)-99.4 °F (37.4 °C)] 98 °F (36.7 °C)  Pulse:  [] 91  Resp:  [18] 18  BP: (115-121)/(61-79) 117/61  SpO2:  [96 %-97 %] 96 %    General:  Alert and oriented.  Diffuse skin problem:  None.  Eye:  Pupils are equal, round and reactive to light, extraocular movements are intact, Normal conjunctiva.  HENT:  Normocephalic, oral mucosa is moist.  Head:  Normocephalic, atraumatic.  Neck:  Supple, non-tender, no carotid  bruit, no jugular venous distention, no lymphadenopathy, no thyromegaly.  Respiratory:  Lungs are clear to auscultation, respirations are non-labored, breath sounds are equal, symmetrical chest wall expansion.  Cardiovascular: Irregularly irregular, no murmur, no edema.  Gastrointestinal:  Soft, tender to palpation, distended, firm.  Grossly ecchymosis.   Lymphatics:  No lymphadenopathy neck, axilla, groin.  Musculoskeletal: Normal range of motion.  normal strength.  Feet:  Normal pulses.  Neurologic:  Alert, oriented, no focal deficits, cranial nerves II-XII are grossly intact.  Cognition and Speech:  Oriented, speech clear and coherent.  Psychiatric:  Cooperative, appropriate mood & affect.      Laboratory Data:   Lab Results   Component Value Date    WBC 16.0 05/13/2024    HGB 9.3 05/13/2024    HCT 26.9 05/13/2024    .0 05/13/2024    CREATSERUM 1.21 05/13/2024    BUN 38 05/13/2024     05/13/2024    K 4.2 05/13/2024    CL 96 05/13/2024    CO2 24.0 05/13/2024     05/13/2024    CA 8.7 05/13/2024    ALB 4.0 05/13/2024    ALKPHO 85 05/13/2024    BILT 2.7 05/13/2024    TP 7.0 05/13/2024    AST 48 05/13/2024    ALT 55 05/13/2024    PTT 53.3 05/13/2024    INR 2.67 05/13/2024    LIP 34 05/13/2024       Imaging:  No results found.     Assessment and Plan:    Acute blood loss anemia  Right rectus sheath hematoma  -Per CT abdomen/pelvis 15 x 11 x 17 cm, no active bleeding, no hemoperitoneum/severe identified.  Patient is hemodynamically stable.  -Suspecting secondary to Coumadin/Lovenox for the last 5 days.  -Hemoglobin noted to be 9.3, previous 15.5 1/21/24  -General surgery consulted (Dr. Joshua notified), pending evaluation  -Maintain patient n.p.o.  -Type and screen  -Gentle IV fluid hydration  -Continue to monitor hemoglobin and transfuse if less than 7    Chronic atrial fibrillation  -Currently rate controlled. [id number] 7.  Patient is normally on Coumadin, currently on hold due to above.  INR  2.67  -Cardiology has been consulted, Dr. Carmichael notified, pending evaluation  -Resume home dose of metoprolol.  Maintain on telemetry    Hyponatremia, acute  -Sodium level appears to be 127, previous 136 1/2024.  -Check urine lites.  Will give gentle IV fluid hydration and reassess sodium level    HFrEF -EF 45 -50%.  Compensated.  -Cards consulted    Other medical conditions  CAD s/p CAD s/p CABG w/ LIMA graft to LAD   Mitral regurgitation s/p mitral valve repair   Embolic TIA  Previous DVT  Chronic back pain     Prophylaxis  SCDs    CODE STATUS  Full    Primary care physician  Alden Faulkner MD    60 minutes spent on this admission - examining patient, obtaining history, reviewing previous medical records, going over test results/imaging and discussing plan of care. All questions answered.     Disposition  Clinical course will dictate outcome      April Farrell MD  5/14/2024  2:05 AM

## 2024-05-14 NOTE — CONSULTS
Spiritual Care Visit Note    Patient Name: Mohan Castañeda Date of Spiritual Care Visit: 24   : 1950 Primary Dx: Abdominal wall hematoma, initial encounter       Referred By: Referral From: Patient;Nurse    Spiritual Care Taxonomy:    Intended Effects: Demonstrate caring and concern    Methods: Collaborate with care team member;Offer support;Offer spiritual/Spiritism support    Interventions: Active listening;Ask guided questions;Ask guided questions about gray;Emerson;Provide a Spiritism item(s)    Visit Type/Summary:     - Spiritual Care: Consulted with RN prior to visit. Offered empathic listening and emotional support. Patient and family expressed appreciation for  visit. Provided information regarding how to contact Spiritual Care and left a Spiritual Care information card. Provided support for Patient's spiritual/Spiritism requests. Offered prayer.    Spiritual Care support can be requested via an Epic consult. For urgent/immediate needs, please contact the On Call  at: [place]: ext [phone]     [facility]   [id number]

## 2024-05-14 NOTE — ED QUICK NOTES
Orders for admission, patient is aware of plan and ready to go upstairs. Any questions, please call ED RN Hank at extension [phone].     Patient Covid vaccination status: Unvaccinated     COVID Test Ordered in ED: None    COVID Suspicion at Admission: N/A    Running Infusions:  None    Mental Status/LOC at time of transport: A&Ox4    Other pertinent information:   CIWA score: N/A   NIH score:  N/A

## 2024-05-15 LAB
ANION GAP SERPL CALC-SCNC: 8 MMOL/L (ref 0–18)
BASOPHILS # BLD AUTO: 0.05 X10(3) UL (ref 0–0.2)
BASOPHILS NFR BLD AUTO: 0.5 %
BUN BLD-MCNC: 21 MG/DL (ref 9–23)
BUN/CREAT SERPL: 27.6 (ref 10–20)
CALCIUM BLD-MCNC: 8.5 MG/DL (ref 8.7–10.4)
CHLORIDE SERPL-SCNC: 99 MMOL/L (ref 98–112)
CO2 SERPL-SCNC: 23 MMOL/L (ref 21–32)
CREAT BLD-MCNC: 0.76 MG/DL
DEPRECATED RDW RBC AUTO: 45.4 FL (ref 35.1–46.3)
EGFRCR SERPLBLD CKD-EPI 2021: 94 ML/MIN/1.73M2 (ref 60–?)
EOSINOPHIL # BLD AUTO: 0.27 X10(3) UL (ref 0–0.7)
EOSINOPHIL NFR BLD AUTO: 2.6 %
ERYTHROCYTE [DISTWIDTH] IN BLOOD BY AUTOMATED COUNT: 13.5 % (ref 11–15)
GLUCOSE BLD-MCNC: 111 MG/DL (ref 70–99)
HCT VFR BLD AUTO: 25.3 %
HGB BLD-MCNC: 8.6 G/DL
IMM GRANULOCYTES # BLD AUTO: 0.09 X10(3) UL (ref 0–1)
IMM GRANULOCYTES NFR BLD: 0.9 %
INR BLD: 2.22 (ref 0.8–1.2)
LYMPHOCYTES # BLD AUTO: 1.36 X10(3) UL (ref 1–4)
LYMPHOCYTES NFR BLD AUTO: 13.2 %
MCH RBC QN AUTO: 32.1 PG (ref 26–34)
MCHC RBC AUTO-ENTMCNC: 34 G/DL (ref 31–37)
MCV RBC AUTO: 94.4 FL
MONOCYTES # BLD AUTO: 1.42 X10(3) UL (ref 0.1–1)
MONOCYTES NFR BLD AUTO: 13.8 %
NEUTROPHILS # BLD AUTO: 7.13 X10 (3) UL (ref 1.5–7.7)
NEUTROPHILS # BLD AUTO: 7.13 X10(3) UL (ref 1.5–7.7)
NEUTROPHILS NFR BLD AUTO: 69 %
OSMOLALITY SERPL CALC.SUM OF ELEC: 274 MOSM/KG (ref 275–295)
PLATELET # BLD AUTO: 281 10(3)UL (ref 150–450)
POTASSIUM SERPL-SCNC: 3.7 MMOL/L (ref 3.5–5.1)
PROTHROMBIN TIME: 26 SECONDS (ref 11.6–14.8)
RBC # BLD AUTO: 2.68 X10(6)UL
SODIUM SERPL-SCNC: 130 MMOL/L (ref 136–145)
WBC # BLD AUTO: 10.3 X10(3) UL (ref 4–11)

## 2024-05-15 PROCEDURE — 99233 SBSQ HOSP IP/OBS HIGH 50: CPT | Performed by: INTERNAL MEDICINE

## 2024-05-15 RX ORDER — ERYTHROMYCIN 5 MG/G
1 OINTMENT OPHTHALMIC DAILY
Status: DISCONTINUED | OUTPATIENT
Start: 2024-05-15 | End: 2024-05-20

## 2024-05-15 NOTE — PROGRESS NOTES
[facility]  part of [facility]     Hospitalist Progress Note     Mohan Castañeda Patient Status:  Inpatient    1950 MRN [id number]   Location [facility] 5SW/SE Attending Andra Hoang MD   Hosp Day # 1 PCP Alden Faulkner MD     Subjective:     Patient laying comfortably in bed and in NAD. Denied any active complaints at the time of interview.   Looking forward to discharge soon.       Objective:    Review of Systems:   ROS completed; pertinent positive and negatives stated in subjective.      Vital signs:  Temp:  [98.1 °F (36.7 °C)-98.4 °F (36.9 °C)] 98.2 °F (36.8 °C)  Pulse:  [] 101  Resp:  [18] 18  BP: (108-119)/(69-74) 114/69  SpO2:  [95 %-97 %] 97 %      Physical Exam:    Gen: NAD AO x3  Chest: good air entry CTABL  CVS: normal s1 and s2 RR  Abd: NABS soft NT ND, ecchymosis present  Neuro: CN 2-12 grossly intact  Ext: no edema in bilateral LE      Diagnostic Data:    Labs:  Recent Labs   Lab 05/13/24  2149 05/14/24  0242 05/15/24  0729 05/15/24  0730   WBC 16.0* 14.4*  --  10.3   HGB 9.3* 8.6*  --  8.6*   MCV 92.1 93.5  --  94.4   .0 284.0  --  281.0   BAND  --  1  --   --    INR 2.67* 2.69* 2.22*  --        Recent Labs   Lab 05/13/24  2149 05/14/24  0242 05/15/24  0729   * 129* 111*   BUN 38* 35* 21   CREATSERUM 1.21 1.07 0.76   CA 8.7 8.9 8.5*   ALB 4.0  --   --    * 128* 130*   K 4.2 4.4 3.7   CL 96* 96* 99   CO2 24.0 26.0 23.0   ALKPHO 85  --   --    AST 48*  --   --    ALT 55*  --   --    BILT 2.7*  --   --    TP 7.0  --   --        Estimated Creatinine Clearance: 86.7 mL/min (based on SCr of 0.76 mg/dL).    Recent Labs   Lab 24  2149 24  0242 05/15/24  0729   PTP 30.1* 30.2* 26.0*   INR 2.67* 2.69* 2.22*              Imaging: Imaging data reviewed in Epic.    Medications:    atorvastatin  20 mg Oral Daily    metoprolol tartrate  25 mg Oral BID       Assessment & Plan:     Large rectus sheath hematoma  Permanent afib - CHADsVASc  7  No active bleeding  CT reviewed  Monitor H&H, transfuse as indicated  Cardiology on consult  Hold AC at this time. Restart once okay with Gsx  Heparin GTT if INR < 2.0 and observe  Continue metoprolol, statin  Gsx on consult  Okay to resume AC  Maintain INR 2-2.5   Outpatient follow up in 6 weeks  HFrEF - EF 40-45%  CAD s/p CABG  Hx of MR sp MV repair  Hx of TIA  HLD      Plan of care discussed with patient or family at bedside.      Supplementary Documentation:     Quality:  DVT Prophylaxis: SCDs  CODE status: Full      Estimated date of discharge: TBD  Discharge is dependent on: clinical stability  At this point Mr. Castañeda is expected to be discharge to: home            **Certification      PHYSICIAN Certification of Need for Inpatient Hospitalization - Initial Certification    Patient will require inpatient services that will reasonably be expected to span two midnight's based on the clinical documentation in H+P.   Based on patients current state of illness, I anticipate that, after discharge, patient will require TBD.      Andra Hoang MD  Hospitalist    MDM: High, I personally reviewed the available laboratories, imaging including CT. I discussed the case with RN. I ordered laboratories, studies including AM labs.  Medical decision making high, risk is high.       The 21st Century Cures Act makes medical notes like these available to patients in the interest of transparency. Please be advised this is a medical document. Medical documents are intended to carry relevant information, facts as evident, and the clinical opinion of the practitioner. The medical note is intended as peer to peer communication and may appear blunt or direct. It is written in medical language and may contain abbreviations or verbiage that are unfamiliar.

## 2024-05-15 NOTE — PROGRESS NOTES
Cardiology  Progress Note  [facility]    Mohan Castañeda Patient Status:  Inpatient    1950 MRN [id number]   Location [facility] 5SW/SE Attending Tj Alvarado MD   Hosp Day # 1 PCP Alden Faulkner MD     SUBJECTIVE:    He discussed his multiple stressors in life currently. He is wanting to expedite getting back on anticoagulant. Abdominal discomfort improved.        Reason for Consultation:  Regarding anticoagulation.    History of Present Illness:  Mohan Castañeda is a a(n) 73 year old male PMH of CAD s/p CABG, chronic Afib on coumadin, MR s/p mitral valve repair, HFrEF, hx of previous DVT and hx of TIA who presents to ED on 24 with abodminal pain. Symptoms have been ongoing for ~5 days. Patient has been giving himself Lovenox injections to supplement his Coumadin in anticipation for right leg surgery that was canceled last week. Started noticing abdominal pain, swelling and bruising. Denies any chest pain, SOB or palpitations.   In ED, pt was hemodynamically stable. Hgb noted to down to 9 (from baseline of 15). CT abdomen showed large rectus sheath hematoma. Admitted for further evaluation.     Currently, pt reports doing ok. Denies any chest pain, SOB or palpitations. Remains hemodynamically stable.     History:  Past Medical History:    Abdominal wall hematoma    Status post embolization right inferior epigastric artery pseudoaneurysm    Aortic atherosclerosis (HCC)    CT scan -    ASHD (arteriosclerotic heart disease)    Cath 11-15 with EF 55%, 85% left main, s/p CABG ×1 with LIMA graft to LAD 11-15    Atrial fibrillation (HCC)    Back problem    BPH (benign prostatic hyperplasia)    Deep vein thrombosis (HCC)    Blood Clot passed through Left Eye    Dyslipidemia    Hemorrhoids    Long term (current) use of anticoagulants    Mitral regurgitation    Severe, s/p mitral valve repair -15; Echo 10-23 with EF 45-50%, moderate SHERWIN, mild AI    Osteoarthritis    lower back     Prostate cancer (HCC)    Asim's grade 3+3; PSA 8.55    SVT (supraventricular tachycardia) (HCC)    TIA (transient ischemic attack)    Transient vision loss left eye; INR subtherapeutic; MRA carotids with possible narrowing right vertebral artery origin; Echo with EF 45-50%, moderate SHERWIN, mild AI     Past Surgical History:   Procedure Laterality Date    Cabg  2015    CABG ×1 with LIMA graft to LAD and mitral valve repair with ligation left atrial appendage    Hemorrhoidectomy      Other      Excision benign calcified nodules right shoulder    Other      Nasal fracture    Other  2019    Embolization right inferior epigastric artery pseudoaneurysm    Other surgical history Right     Right Upper chest/axilla region lysis of adhesions     Family History   Problem Relation Age of Onset    Other (CVA [Other]) Mother 56         age 56    Breast Cancer Mother     Dementia Father     Prostate Cancer Father     Diabetes Father     Hypertension Brother       reports that he quit smoking about 42 years ago. His smoking use included cigarettes. He started smoking about 57 years ago. He has a 22.5 pack-year smoking history. He has never used smokeless tobacco. He reports that he does not currently use drugs. He reports that he does not drink alcohol.    Allergies:  No Known Allergies    Medications:  No current facility-administered medications on file prior to encounter.     Current Outpatient Medications on File Prior to Encounter   Medication Sig Dispense Refill    warfarin 5 MG Oral Tab Take 1 tablet (5 mg total) by mouth nightly. 90 tablet 1    Ergocalciferol (VITAMIN D OR) Take 1,000 Units by mouth daily.        aspirin 81 MG Oral Tab EC Take 1 tablet (81 mg total) by mouth at bedtime.      Saw Palmetto, Serenoa repens, (SAW PALMETTO OR) Take 1 capsule by mouth daily.      NETTLE-PYGEUM AFRICANUM OR Take 1 capsule by mouth daily.      Cyanocobalamin (VITAMIN B-12 OR) Take by mouth daily.       metoprolol tartrate 25 MG Oral Tab Take 1 tablet (25 mg total) by mouth 2 (two) times daily. 180 tablet 1    ATORVASTATIN 20 MG Oral Tab Take 1 tablet by mouth once daily 90 tablet 0    Warfarin Sodium 2 MG Oral Tab Take 1 tablet (2 mg total) by mouth nightly. No coumadin tonight . Start taking on . INR on Friday 1/3, call MD for instructions on dose and the timing of the next INR (Patient taking differently: Take 2.5 mg by mouth nightly. No coumadin tonight . Start taking on . INR on Friday 1/3, call MD for instructions on dose and the timing of the next INR) 30 tablet 0           Physical Exam:  Blood pressure 119/69, pulse 94, temperature 98.4 °F (36.9 °C), temperature source Oral, resp. rate 18, height 5' 10\" (1.778 m), weight 199 lb 9.6 oz (90.5 kg), SpO2 97%.  Wt Readings from Last 3 Encounters:   24 199 lb 9.6 oz (90.5 kg)   24 195 lb (88.5 kg)   10/13/23 195 lb 12.8 oz (88.8 kg)       General: awake, alert, oriented x 3, no acute distress  HEENT: at/nc, perrl, eomi  Neck: supple  Cardiac: irregular rhythm, S1, S2 normal, no murmur, rub or gallop.  Lungs: Clear without wheezes, rales, rhonchi or dullness.  Normal excursions and effort.  Abdomen: Soft, distended, ecchymosis lower abdomen  Extremities: Without clubbing, cyanosis or edema.  Peripheral pulses are 2+.  Neurologic: Alert and oriented, normal affect.  Psych: normal mood and affect  Skin: Warm and dry.       Laboratories and Data:  Diagnostics:  EK24  Afib  with PVC, RBBB    Echo: 10/3/23  Conclusions:     1. Left ventricle: The cavity size was normal. Wall thickness was normal. Systolic function was mildly reduced. The estimated ejection fraction was 45-50%, by biplane method of disks. Technical limitations of the study preclude regional wall motion analysis. Unable to assess LV diastolic function due to heart rhythm.   2. Right ventricle: The cavity size was mildly increased. Systolic function  was mildly reduced.   3. Left atrium: The atrium was moderately dilated.   4. Right atrium: The atrium was moderately dilated.   5. Aortic valve: There was mild regurgitation.     Stress Test: 10/16/20  Summary:   1. Stress echo: There is no evidence for stress-induced ischemia.   2. Stress ECG conclusions: The stress ECG is negative for ischemia.       Labs:   CBC:    Lab Results   Component Value Date    WBC 10.3 05/15/2024    WBC 14.4 (H) 05/14/2024    WBC 16.0 (H) 05/13/2024     Lab Results   Component Value Date    HGB 8.6 (L) 05/15/2024    HGB 8.6 (L) 05/14/2024    HGB 9.3 (L) 05/13/2024      Lab Results   Component Value Date    .0 05/15/2024    .0 05/14/2024    .0 05/13/2024     BMP:   No results found for: \"GLUCOSE\"  Recent Labs   Lab 05/13/24  2149 05/14/24  0242 05/15/24  0729   * 129* 111*   BUN 38* 35* 21   CREATSERUM 1.21 1.07 0.76   EGFRCR 62 72 94   CA 8.7 8.9 8.5*   * 128* 130*   K 4.2 4.4 3.7   CL 96* 96* 99   CO2 24.0 26.0 23.0     Lab Results   Component Value Date    INR 2.22 (H) 05/15/2024    INR 2.69 (H) 05/14/2024    INR 2.67 (H) 05/13/2024        Cholesterol:     Lab Results   Component Value Date    CHOLEST 148 10/02/2023    CHOLEST 149 04/04/2022    CHOLEST 142.00 09/30/2021     Lab Results   Component Value Date    HDL 56 10/02/2023    HDL 60 (H) 04/04/2022    HDL 54 09/30/2021     Lab Results   Component Value Date    TRIG 71 10/02/2023    TRIG 58 04/04/2022    TRIG 60.00 09/30/2021     Lab Results   Component Value Date    LDL 78 10/02/2023    LDL 77 04/04/2022    LDL 76 09/30/2021     Lab Results   Component Value Date    AST 48 (H) 05/13/2024    AST 26 10/02/2023    AST 18 04/04/2022     Lab Results   Component Value Date    ALT 55 (H) 05/13/2024    ALT 34 10/02/2023    ALT 28 04/04/2022       Assessment/Plan:    # Large rectus sheath hematoma: 15 x 11 x 17 cm. No active bleeding. Prior episode 12/2019  # Permanent afib - CHADs-VASC score 7  # HFrEF -  last EF 40-45%  # CAD s/p CABG x 1v (LIMA to LAD)  # Hx of MR s/p Mitral valve repair   # Hx of TIA  # HLD     Plan:   - Given large rectus sheath hematoma and significant drop in his hemoglobin, will hold anticoagulation until evaluation from general surgery. Usually rectus sheath hematoma will tamponade on its own, and will resolve overtime.    - Discussed stroke risk per patient but given the high bleeding risk at this time, risks outweighs the benefits of being on anticoagulation at this time. Risk of stroke discussed in detail this morning.    - Will restart once OK per general surgery   - Once time to resume, will recommend starting heparin gtt (if INR<2.0) and observe. Would defer lovenox.   - Continue metoprolol 25mg BID  - Continue atorvastatin 20mg daily   - Monitor telemetry     Cardiology will follow. Please call us with any questions.

## 2024-05-15 NOTE — PROGRESS NOTES
[facility]  part of [facility]    Progress Note    Mohan Castañeda Patient Status:  Inpatient    1950 MRN [id number]   Location [facility] 5SW/SE Attending Andra Hoang MD   Hosp Day # 1 PCP Alden Faulkner MD       Subjective:   Mohan Castañeda is a(n) 73 year old   male without complaints    Assessment and Plan:   Mohan Castañeda is a(n) 73 year old male    With large rectus sheath hematoma which developed during bridging for surgical procedure.  Surgical procedure delayed at this time.  Large hematoma stable.  No surgical intervention  Hematoma will resolve over the next 2 to 4 months.  May resume anticoagulation.  INR today is 2.22.  Hopefully keep INR between 2-2.5 for the next 1 to 2 weeks if possible.  Will discuss with cardiology if patient is stable for discharge home or does he need IV heparin.  Hopefully can avoid dual anticoagulation if possible.  Patient will follow-up with me in approximate 6 weeks in the office for follow-up evaluation.    Objective:   Blood pressure 119/69, pulse 94, temperature 98.4 °F (36.9 °C), temperature source Oral, resp. rate 18, height 5' 10\" (1.778 m), weight 199 lb 9.6 oz (90.5 kg), SpO2 97%. I/O last 3 completed shifts:  In: 990 [P.O.:990]  Out: -      General appearance: alert, appears stated age, and cooperative  Neck: no JVD and supple, symmetrical, trachea midline  Pulmonary: No labored breathing, equal respiratory excursion  Cardiovascular: regular rate and rhythm  Abdominal: Soft large right rectus sheath hematoma with large amount of ecchymosis and bruising.  Remaining abdomen is soft nontender.    Extremities: extremities normal, atraumatic, no cyanosis or edema          Results:       Recent Labs   Lab 24  2149 24  0242 05/15/24  0730   RBC 2.92* 2.76* 2.68*   HGB 9.3* 8.6* 8.6*   HCT 26.9* 25.8* 25.3*   MCV 92.1 93.5 94.4   MCH 31.8 31.2 32.1   MCHC 34.6 33.3 34.0   RDW 13.2 13.2 13.5   NEPRELIM 11.68* 10.01*  7.13   WBC 16.0* 14.4* 10.3   .0 284.0 281.0     Lab Results   Component Value Date    INR 2.22 (H) 05/15/2024       Recent Labs   Lab 05/13/24  2149 05/14/24  0242 05/15/24  0729   * 129* 111*   BUN 38* 35* 21   CREATSERUM 1.21 1.07 0.76   CA 8.7 8.9 8.5*   ALB 4.0  --   --    * 128* 130*   K 4.2 4.4 3.7   CL 96* 96* 99   CO2 24.0 26.0 23.0   ALKPHO 85  --   --    AST 48*  --   --    ALT 55*  --   --    BILT 2.7*  --   --    TP 7.0  --   --              CT ABDOMEN+PELVIS(CONTRAST ONLY)(CPT=74177)    Result Date: 5/14/2024  CONCLUSION:   17.5 cm subacute right rectus sheath hematoma.  Prominent branches of the inferior epigastric artery seen along the superior and mid aspect of the hematoma without definite contrast extravasation.  Prior ligation of the right inferior epigastric artery.  Opacification is still seen involving the inferior epigastric artery proximal and distal to the ligation clips.  Multiple other incidental findings as described in the body of the report.  Preliminary report was submitted by the [facility] teleradiologist and there are no significant discrepancies.  [email or web address]     Dictated by (CST): Rao Jackson MD on 5/14/2024 at 9:38 AM     Finalized by (CST): Rao Jackson MD on 5/14/2024 at 9:48 AM         EKG 12 Lead    Result Date: 5/14/2024  Atrial fibrillation with premature ventricular or aberrantly conducted complexes Right bundle branch block Abnormal ECG When compared with ECG of 03-OCT-2023 12:24, No significant change Confirmed by YESSENIA LEE, DAYANA ([id number]) on 5/14/2024 2:41:02 PM       Time spent in direct patient contact and decision making as well as counseling/coordination of care:    53753- 35 min      BALDEV LINDSEY MD [facility]  GENERAL SURGERY  [facility]  5/15/2024  9:08 AM

## 2024-05-16 LAB
ALBUMIN SERPL-MCNC: 3.5 G/DL (ref 3.2–4.8)
ALBUMIN/GLOB SERPL: 1.3 {RATIO} (ref 1–2)
ALP LIVER SERPL-CCNC: 94 U/L
ALT SERPL-CCNC: 38 U/L
ANION GAP SERPL CALC-SCNC: 7 MMOL/L (ref 0–18)
ANION GAP SERPL CALC-SCNC: 7 MMOL/L (ref 0–18)
APTT PPP: 46.2 SECONDS (ref 23–36)
APTT PPP: 64 SECONDS (ref 23–36)
AST SERPL-CCNC: 36 U/L (ref ?–34)
BASOPHILS # BLD AUTO: 0.03 X10(3) UL (ref 0–0.2)
BASOPHILS NFR BLD AUTO: 0.3 %
BILIRUB SERPL-MCNC: 2.3 MG/DL (ref 0.2–1.1)
BUN BLD-MCNC: 21 MG/DL (ref 9–23)
BUN BLD-MCNC: 21 MG/DL (ref 9–23)
BUN/CREAT SERPL: 25.6 (ref 10–20)
BUN/CREAT SERPL: 25.6 (ref 10–20)
CALCIUM BLD-MCNC: 8.6 MG/DL (ref 8.7–10.4)
CALCIUM BLD-MCNC: 8.6 MG/DL (ref 8.7–10.4)
CHLORIDE SERPL-SCNC: 98 MMOL/L (ref 98–112)
CHLORIDE SERPL-SCNC: 98 MMOL/L (ref 98–112)
CO2 SERPL-SCNC: 25 MMOL/L (ref 21–32)
CO2 SERPL-SCNC: 25 MMOL/L (ref 21–32)
CREAT BLD-MCNC: 0.82 MG/DL
CREAT BLD-MCNC: 0.82 MG/DL
DEPRECATED RDW RBC AUTO: 45.6 FL (ref 35.1–46.3)
EGFRCR SERPLBLD CKD-EPI 2021: 92 ML/MIN/1.73M2 (ref 60–?)
EGFRCR SERPLBLD CKD-EPI 2021: 92 ML/MIN/1.73M2 (ref 60–?)
EOSINOPHIL # BLD AUTO: 0.36 X10(3) UL (ref 0–0.7)
EOSINOPHIL NFR BLD AUTO: 3.5 %
ERYTHROCYTE [DISTWIDTH] IN BLOOD BY AUTOMATED COUNT: 13.5 % (ref 11–15)
GLOBULIN PLAS-MCNC: 2.8 G/DL (ref 2–3.5)
GLUCOSE BLD-MCNC: 114 MG/DL (ref 70–99)
GLUCOSE BLD-MCNC: 114 MG/DL (ref 70–99)
HCT VFR BLD AUTO: 26.3 %
HGB BLD-MCNC: 8.4 G/DL
IMM GRANULOCYTES # BLD AUTO: 0.13 X10(3) UL (ref 0–1)
IMM GRANULOCYTES NFR BLD: 1.2 %
INR BLD: 1.62 (ref 0.8–1.2)
LYMPHOCYTES # BLD AUTO: 1.9 X10(3) UL (ref 1–4)
LYMPHOCYTES NFR BLD AUTO: 18.3 %
MCH RBC QN AUTO: 30.4 PG (ref 26–34)
MCHC RBC AUTO-ENTMCNC: 31.9 G/DL (ref 31–37)
MCV RBC AUTO: 95.3 FL
MONOCYTES # BLD AUTO: 1.35 X10(3) UL (ref 0.1–1)
MONOCYTES NFR BLD AUTO: 13 %
NEUTROPHILS # BLD AUTO: 6.64 X10 (3) UL (ref 1.5–7.7)
NEUTROPHILS # BLD AUTO: 6.64 X10(3) UL (ref 1.5–7.7)
NEUTROPHILS NFR BLD AUTO: 63.7 %
OSMOLALITY SERPL CALC.SUM OF ELEC: 274 MOSM/KG (ref 275–295)
OSMOLALITY SERPL CALC.SUM OF ELEC: 274 MOSM/KG (ref 275–295)
PLATELET # BLD AUTO: 337 10(3)UL (ref 150–450)
POTASSIUM SERPL-SCNC: 3.5 MMOL/L (ref 3.5–5.1)
POTASSIUM SERPL-SCNC: 3.5 MMOL/L (ref 3.5–5.1)
PROT SERPL-MCNC: 6.3 G/DL (ref 5.7–8.2)
PROTHROMBIN TIME: 20.3 SECONDS (ref 11.6–14.8)
RBC # BLD AUTO: 2.76 X10(6)UL
SODIUM SERPL-SCNC: 130 MMOL/L (ref 136–145)
SODIUM SERPL-SCNC: 130 MMOL/L (ref 136–145)
WBC # BLD AUTO: 10.4 X10(3) UL (ref 4–11)

## 2024-05-16 PROCEDURE — 99233 SBSQ HOSP IP/OBS HIGH 50: CPT | Performed by: INTERNAL MEDICINE

## 2024-05-16 RX ORDER — WARFARIN SODIUM 5 MG/1
5 TABLET ORAL
Status: COMPLETED | OUTPATIENT
Start: 2024-05-16 | End: 2024-05-16

## 2024-05-16 RX ORDER — HEPARIN SODIUM AND DEXTROSE 10000; 5 [USP'U]/100ML; G/100ML
1000 INJECTION INTRAVENOUS ONCE
Status: COMPLETED | OUTPATIENT
Start: 2024-05-16 | End: 2024-05-16

## 2024-05-16 RX ORDER — HEPARIN SODIUM AND DEXTROSE 10000; 5 [USP'U]/100ML; G/100ML
INJECTION INTRAVENOUS CONTINUOUS
Status: DISCONTINUED | OUTPATIENT
Start: 2024-05-16 | End: 2024-05-19

## 2024-05-16 NOTE — PLAN OF CARE
Problem: Patient Centered Care  Goal: Patient preferences are identified and integrated in the patient's plan of care  Description: Interventions:  - What would you like us to know as we care for you? I live at home alone  - Provide timely, complete, and accurate information to patient/family  - Incorporate patient and family knowledge, values, beliefs, and cultural backgrounds into the planning and delivery of care  - Encourage patient/family to participate in care and decision-making at the level they choose  - Honor patient and family perspectives and choices  Outcome: Progressing    Problem: PAIN - ADULT  Goal: Verbalizes/displays adequate comfort level or patient's stated pain goal  Description: INTERVENTIONS:  - Encourage pt to monitor pain and request assistance  - Assess pain using appropriate pain scale  - Administer analgesics based on type and severity of pain and evaluate response  - Implement non-pharmacological measures as appropriate and evaluate response  - Consider cultural and social influences on pain and pain management  - Manage/alleviate anxiety  - Utilize distraction and/or relaxation techniques  - Monitor for opioid side effects  - Notify MD/LIP if interventions unsuccessful or patient reports new pain  - Anticipate increased pain with activity and pre-medicate as appropriate  Outcome: Progressing     Problem: CARDIOVASCULAR - ADULT  Goal: Maintains optimal cardiac output and hemodynamic stability  Description: INTERVENTIONS:  - Monitor vital signs, rhythm, and trends  - Monitor for bleeding, hypotension and signs of decreased cardiac output  - Evaluate effectiveness of vasoactive medications to optimize hemodynamic stability  - Monitor arterial and/or venous puncture sites for bleeding and/or hematoma  - Assess quality of pulses, skin color and temperature  - Assess for signs of decreased coronary artery perfusion - ex. Angina  - Evaluate fluid balance, assess for edema, trend  weights  Outcome: Progressing  Goal: Absence of cardiac arrhythmias or at baseline  Description: INTERVENTIONS:  - Continuous cardiac monitoring, monitor vital signs, obtain 12 lead EKG if indicated  - Evaluate effectiveness of antiarrhythmic and heart rate control medications as ordered  - Initiate emergency measures for life threatening arrhythmias  - Monitor electrolytes and administer replacement therapy as ordered  Outcome: Progressing     Problem: GENITOURINARY - ADULT  Goal: Absence of urinary retention  Description: INTERVENTIONS:  - Assess patient’s ability to void and empty bladder  - Monitor intake/output and perform bladder scan as needed  - Follow urinary retention protocol/standard of care  - Consider collaborating with pharmacy to review patient's medication profile  - Implement strategies to promote bladder emptying  Outcome: Progressing     Problem: SKIN/TISSUE INTEGRITY - ADULT  Goal: Skin integrity remains intact  Description: INTERVENTIONS  - Assess and document risk factors for pressure ulcer development  - Assess and document skin integrity  - Monitor for areas of redness and/or skin breakdown  - Initiate interventions, skin care algorithm/standards of care as needed  Outcome: Progressing  Goal: Incision(s), wounds(s) or drain site(s) healing without S/S of infection  Description: INTERVENTIONS:  - Assess and document risk factors for pressure ulcer development  - Assess and document skin integrity  - Assess and document dressing/incision, wound bed, drain sites and surrounding tissue  - Implement wound care per orders  - Initiate isolation precautions as appropriate  - Initiate Pressure Ulcer prevention bundle as indicated  Outcome: Progressing  Goal: Oral mucous membranes remain intact  Description: INTERVENTIONS  - Assess oral mucosa and hygiene practices  - Implement preventative oral hygiene regimen  - Implement oral medicated treatments as ordered  Outcome: Progressing     Problem:  HEMATOLOGIC - ADULT  Goal: Maintains hematologic stability  Description: INTERVENTIONS  - Assess for signs and symptoms of bleeding or hemorrhage  - Monitor labs and vital signs for trends  - Administer supportive blood products/factors, fluids and medications as ordered and appropriate  - Administer supportive blood products/factors as ordered and appropriate  Outcome: Progressing  Goal: Free from bleeding injury  Description: (Example usage: patient with low platelets)  INTERVENTIONS:  - Avoid intramuscular injections, enemas and rectal medication administration  - Ensure safe mobilization of patient  - Hold pressure on venipuncture sites to achieve adequate hemostasis  - Assess for signs and symptoms of internal bleeding  - Monitor lab trends  - Patient is to report abnormal signs of bleeding to staff  - Avoid use of toothpicks and dental floss  - Use electric shaver for shaving  - Use soft bristle tooth brush  - Limit straining and forceful nose blowing  Outcome: Progressing    Patient comfortable in bed, orders meals and calls appropriately. Compliant with SCDs. Medication ordered for bilateral eye discomfort per MD.

## 2024-05-16 NOTE — PLAN OF CARE
Problem: Patient Centered Care  Goal: Patient preferences are identified and integrated in the patient's plan of care  Description: Interventions:  - What would you like us to know as we care for you? Lives alone  - Provide timely, complete, and accurate information to patient/family  - Incorporate patient and family knowledge, values, beliefs, and cultural backgrounds into the planning and delivery of care  - Encourage patient/family to participate in care and decision-making at the level they choose  - Honor patient and family perspectives and choices  Outcome: Progressing     Problem: GENITOURINARY - ADULT  Goal: Absence of urinary retention  Description: INTERVENTIONS:  - Assess patient’s ability to void and empty bladder  - Monitor intake/output and perform bladder scan as needed  - Follow urinary retention protocol/standard of care  - Consider collaborating with pharmacy to review patient's medication profile  - Implement strategies to promote bladder emptying  Outcome: Progressing     Problem: SKIN/TISSUE INTEGRITY - ADULT  Goal: Skin integrity remains intact  Description: INTERVENTIONS  - Assess and document risk factors for pressure ulcer development  - Assess and document skin integrity  - Monitor for areas of redness and/or skin breakdown  - Initiate interventions, skin care algorithm/standards of care as needed  Outcome: Progressing     Problem: HEMATOLOGIC - ADULT  Goal: Free from bleeding injury  Description: (Example usage: patient with low platelets)  INTERVENTIONS:  - Avoid intramuscular injections, enemas and rectal medication administration  - Ensure safe mobilization of patient  - Hold pressure on venipuncture sites to achieve adequate hemostasis  - Assess for signs and symptoms of internal bleeding  - Monitor lab trends  - Patient is to report abnormal signs of bleeding to staff  - Avoid use of toothpicks and dental floss  - Use electric shaver for shaving  - Use soft bristle tooth brush  -  Limit straining and forceful nose blowing  Outcome: Progressing     Problem: Patient/Family Goals  Goal: Patient/Family Long Term Goal  Description: Patient's Long Term Goal: to discharge home    Interventions:  - follow plan of care  - monitor labs  - monitor patients condition  - See additional Care Plan goals for specific interventions  Outcome: Not Progressing  Goal: Patient/Family Short Term Goal  Description: Patient's Short Term Goal: pain relief    Interventions:   - administer meds as ordered  -monitor patient's condition  - See additional Care Plan goals for specific interventions  Outcome: Not Progressing     Problem: PAIN - ADULT  Goal: Verbalizes/displays adequate comfort level or patient's stated pain goal  Description: INTERVENTIONS:  - Encourage pt to monitor pain and request assistance  - Assess pain using appropriate pain scale  - Administer analgesics based on type and severity of pain and evaluate response  - Implement non-pharmacological measures as appropriate and evaluate response  - Consider cultural and social influences on pain and pain management  - Manage/alleviate anxiety  - Utilize distraction and/or relaxation techniques  - Monitor for opioid side effects  - Notify MD/LIP if interventions unsuccessful or patient reports new pain  - Anticipate increased pain with activity and pre-medicate as appropriate  Outcome: Not Progressing     Problem: CARDIOVASCULAR - ADULT  Goal: Maintains optimal cardiac output and hemodynamic stability  Description: INTERVENTIONS:  - Monitor vital signs, rhythm, and trends  - Monitor for bleeding, hypotension and signs of decreased cardiac output  - Evaluate effectiveness of vasoactive medications to optimize hemodynamic stability  - Monitor arterial and/or venous puncture sites for bleeding and/or hematoma  - Assess quality of pulses, skin color and temperature  - Assess for signs of decreased coronary artery perfusion - ex. Angina  - Evaluate fluid balance,  assess for edema, trend weights  Outcome: Not Progressing  Goal: Absence of cardiac arrhythmias or at baseline  Description: INTERVENTIONS:  - Continuous cardiac monitoring, monitor vital signs, obtain 12 lead EKG if indicated  - Evaluate effectiveness of antiarrhythmic and heart rate control medications as ordered  - Initiate emergency measures for life threatening arrhythmias  - Monitor electrolytes and administer replacement therapy as ordered  Outcome: Not Progressing     Problem: SKIN/TISSUE INTEGRITY - ADULT  Goal: Incision(s), wounds(s) or drain site(s) healing without S/S of infection  Description: INTERVENTIONS:  - Assess and document risk factors for pressure ulcer development  - Assess and document skin integrity  - Assess and document dressing/incision, wound bed, drain sites and surrounding tissue  - Implement wound care per orders  - Initiate isolation precautions as appropriate  - Initiate Pressure Ulcer prevention bundle as indicated  Outcome: Not Progressing     Problem: HEMATOLOGIC - ADULT  Goal: Maintains hematologic stability  Description: INTERVENTIONS  - Assess for signs and symptoms of bleeding or hemorrhage  - Monitor labs and vital signs for trends  - Administer supportive blood products/factors, fluids and medications as ordered and appropriate  - Administer supportive blood products/factors as ordered and appropriate  Outcome: Not Progressing

## 2024-05-16 NOTE — PROGRESS NOTES
Cardiology  Progress Note  [facility]    Mohan Castañeda Patient Status:  Inpatient    1950 MRN [id number]   Location [facility] 5SW/SE Attending Tj Alvarado MD   Hosp Day # 2 PCP Alden Faulkner MD     SUBJECTIVE:    No chest pain or dyspnea. Feels a little better. Still with some abd discomfort.        Reason for Consultation:  Regarding anticoagulation.    History of Present Illness:  Mohan Castañeda is a a(n) 73 year old male PMH of CAD s/p CABG, chronic Afib on coumadin, MR s/p mitral valve repair, HFrEF, hx of previous DVT and hx of TIA who presents to ED on 24 with abodminal pain. Symptoms have been ongoing for ~5 days. Patient has been giving himself Lovenox injections to supplement his Coumadin in anticipation for right leg surgery that was canceled last week. Started noticing abdominal pain, swelling and bruising. Denies any chest pain, SOB or palpitations.   In ED, pt was hemodynamically stable. Hgb noted to down to 9 (from baseline of 15). CT abdomen showed large rectus sheath hematoma. Admitted for further evaluation.     Currently, pt reports doing ok. Denies any chest pain, SOB or palpitations. Remains hemodynamically stable.     History:  Past Medical History:    Abdominal wall hematoma    Status post embolization right inferior epigastric artery pseudoaneurysm    Aortic atherosclerosis (HCC)    CT scan -    ASHD (arteriosclerotic heart disease)    Cath 11-15 with EF 55%, 85% left main, s/p CABG ×1 with LIMA graft to LAD 11-15    Atrial fibrillation (HCC)    Back problem    BPH (benign prostatic hyperplasia)    Deep vein thrombosis (HCC)    Blood Clot passed through Left Eye    Dyslipidemia    Hemorrhoids    Long term (current) use of anticoagulants    Mitral regurgitation    Severe, s/p mitral valve repair -15; Echo 10- with EF 45-50%, moderate SHERWIN, mild AI    Osteoarthritis    lower back    Prostate cancer (HCC)    Asim's grade 3+3; PSA 8.55    SVT  (supraventricular tachycardia) (HCC)    TIA (transient ischemic attack)    Transient vision loss left eye; INR subtherapeutic; MRA carotids with possible narrowing right vertebral artery origin; Echo with EF 45-50%, moderate SHERWIN, mild AI     Past Surgical History:   Procedure Laterality Date    Cabg  2015    CABG ×1 with LIMA graft to LAD and mitral valve repair with ligation left atrial appendage    Hemorrhoidectomy      Other      Excision benign calcified nodules right shoulder    Other      Nasal fracture    Other  2019    Embolization right inferior epigastric artery pseudoaneurysm    Other surgical history Right     Right Upper chest/axilla region lysis of adhesions     Family History   Problem Relation Age of Onset    Other (CVA [Other]) Mother 56         age 56    Breast Cancer Mother     Dementia Father     Prostate Cancer Father     Diabetes Father     Hypertension Brother       reports that he quit smoking about 42 years ago. His smoking use included cigarettes. He started smoking about 57 years ago. He has a 22.5 pack-year smoking history. He has never used smokeless tobacco. He reports that he does not currently use drugs. He reports that he does not drink alcohol.    Allergies:  No Known Allergies    Medications:  No current facility-administered medications on file prior to encounter.     Current Outpatient Medications on File Prior to Encounter   Medication Sig Dispense Refill    warfarin 5 MG Oral Tab Take 1 tablet (5 mg total) by mouth nightly. 90 tablet 1    Ergocalciferol (VITAMIN D OR) Take 1,000 Units by mouth daily.        aspirin 81 MG Oral Tab EC Take 1 tablet (81 mg total) by mouth at bedtime.      Saw Palmetto, Serenoa repens, (SAW PALMETTO OR) Take 1 capsule by mouth daily.      NETTLE-PYGEUM AFRICANUM OR Take 1 capsule by mouth daily.      Cyanocobalamin (VITAMIN B-12 OR) Take by mouth daily.      metoprolol tartrate 25 MG Oral Tab Take 1 tablet (25 mg total) by  mouth 2 (two) times daily. 180 tablet 1    ATORVASTATIN 20 MG Oral Tab Take 1 tablet by mouth once daily 90 tablet 0    Warfarin Sodium 2 MG Oral Tab Take 1 tablet (2 mg total) by mouth nightly. No coumadin tonight . Start taking on . INR on Friday 1/3, call MD for instructions on dose and the timing of the next INR (Patient taking differently: Take 2.5 mg by mouth nightly. No coumadin tonight . Start taking on . INR on Friday 1/3, call MD for instructions on dose and the timing of the next INR) 30 tablet 0           Physical Exam:  Blood pressure 115/75, pulse 83, temperature 97.6 °F (36.4 °C), temperature source Oral, resp. rate 16, height 5' 10\" (1.778 m), weight 199 lb 9.6 oz (90.5 kg), SpO2 98%.  Wt Readings from Last 3 Encounters:   24 199 lb 9.6 oz (90.5 kg)   24 195 lb (88.5 kg)   10/13/23 195 lb 12.8 oz (88.8 kg)       General: awake, alert, oriented x 3, no acute distress  HEENT: at/nc, perrl, eomi  Neck: supple  Cardiac: irregular rhythm, S1, S2 normal, no murmur, rub or gallop.  Lungs: Clear without wheezes, rales, rhonchi or dullness.  Normal excursions and effort.  Abdomen: Soft, distended, ecchymosis lower abdomen  Extremities: Without clubbing, cyanosis or edema.  Peripheral pulses are 2+.  Neurologic: Alert and oriented, normal affect.  Psych: normal mood and affect  Skin: Warm and dry.       Laboratories and Data:  Diagnostics:  EK24  Afib  with PVC, RBBB    Echo: 10/3/23  Conclusions:     1. Left ventricle: The cavity size was normal. Wall thickness was normal. Systolic function was mildly reduced. The estimated ejection fraction was 45-50%, by biplane method of disks. Technical limitations of the study preclude regional wall motion analysis. Unable to assess LV diastolic function due to heart rhythm.   2. Right ventricle: The cavity size was mildly increased. Systolic function was mildly reduced.   3. Left atrium: The atrium was moderately  dilated.   4. Right atrium: The atrium was moderately dilated.   5. Aortic valve: There was mild regurgitation.     Stress Test: 10/16/20  Summary:   1. Stress echo: There is no evidence for stress-induced ischemia.   2. Stress ECG conclusions: The stress ECG is negative for ischemia.       Labs:   CBC:    Lab Results   Component Value Date    WBC 10.4 05/16/2024    WBC 10.3 05/15/2024    WBC 14.4 (H) 05/14/2024     Lab Results   Component Value Date    HGB 8.4 (L) 05/16/2024    HGB 8.6 (L) 05/15/2024    HGB 8.6 (L) 05/14/2024      Lab Results   Component Value Date    .0 05/16/2024    .0 05/15/2024    .0 05/14/2024     BMP:   No results found for: \"GLUCOSE\"  Recent Labs   Lab 05/14/24  0242 05/15/24  0729 05/16/24  0626   * 111* 114*  114*   BUN 35* 21 21  21   CREATSERUM 1.07 0.76 0.82  0.82   EGFRCR 72 94 92  92   CA 8.9 8.5* 8.6*  8.6*   * 130* 130*  130*   K 4.4 3.7 3.5  3.5   CL 96* 99 98  98   CO2 26.0 23.0 25.0  25.0     Lab Results   Component Value Date    INR 1.62 (H) 05/16/2024    INR 2.22 (H) 05/15/2024    INR 2.69 (H) 05/14/2024        Cholesterol:     Lab Results   Component Value Date    CHOLEST 148 10/02/2023    CHOLEST 149 04/04/2022    CHOLEST 142.00 09/30/2021     Lab Results   Component Value Date    HDL 56 10/02/2023    HDL 60 (H) 04/04/2022    HDL 54 09/30/2021     Lab Results   Component Value Date    TRIG 71 10/02/2023    TRIG 58 04/04/2022    TRIG 60.00 09/30/2021     Lab Results   Component Value Date    LDL 78 10/02/2023    LDL 77 04/04/2022    LDL 76 09/30/2021     Lab Results   Component Value Date    AST 36 (H) 05/16/2024    AST 48 (H) 05/13/2024    AST 26 10/02/2023     Lab Results   Component Value Date    ALT 38 05/16/2024    ALT 55 (H) 05/13/2024    ALT 34 10/02/2023       Assessment/Plan:    # Large rectus sheath hematoma: 15 x 11 x 17 cm. No active bleeding. Prior episode 12/2019  # Permanent afib - CHADs-VASC score 7  # HFrEF - last EF  40-45%  # CAD s/p CABG x 1v (LIMA to LAD)  # Hx of MR s/p Mitral valve repair   # Hx of TIA  # HLD     Plan:   - Given large rectus sheath hematoma and significant drop in his hemoglobin, will hold anticoagulation until evaluation from general surgery. Usually rectus sheath hematoma will tamponade on its own, and will resolve overtime.    - ok to begin IV heparin, no bolus, with INR<2. Resume warfarin tonight (will give 5mg tonight)  - Continue metoprolol 25mg BID  - Continue atorvastatin 20mg daily   - Monitor telemetry     Cardiology will follow. Please call us with any questions.

## 2024-05-16 NOTE — PROGRESS NOTES
[facility]  part of [facility]    Progress Note    Mohan Castañeda Patient Status:  Inpatient    1950 MRN [id number]   Location [facility] 5SW/SE Attending Andra Hoang MD   Hosp Day # 2 PCP Alden Faulkner MD       Subjective:   Mohan Castañeda is a(n) 73 year old   male without complaints    Assessment and Plan:   Mohan Castañeda is a(n) 73 year old male    With large rectus sheath hematoma which developed during bridging for surgical procedure.  Surgical procedure delayed at this time.  Large hematoma stable.  No surgical intervention  Hematoma will resolve over the next 2 to 4 months.  May resume anticoagulation.  INR today is 1.62.  Anticoagulation per cardiology  Hopefully keep INR between 2-2.5 for the next 1 to 2 weeks if possible.  Discussed with cardiology if patient is stable with plan for discharge home tomorrow.    Patient will follow-up with me in approximate 6 weeks in the office for follow-up evaluation.    Objective:   Blood pressure 115/75, pulse 83, temperature 97.6 °F (36.4 °C), temperature source Oral, resp. rate 16, height 5' 10\" (1.778 m), weight 199 lb 9.6 oz (90.5 kg), SpO2 98%. I/O last 3 completed shifts:  In: 1080 [P.O.:1080]  Out: -      General appearance: alert, appears stated age, and cooperative  Neck: no JVD and supple, symmetrical, trachea midline  Pulmonary: No labored breathing, equal respiratory excursion  Cardiovascular: regular rate and rhythm  Abdominal: Soft large right rectus sheath hematoma with large amount of ecchymosis and bruising.  Remaining abdomen is soft nontender.    Extremities: extremities normal, atraumatic, no cyanosis or edema          Results:       Recent Labs   Lab 24  0242 05/15/24  0730 24  0626   RBC 2.76* 2.68* 2.76*   HGB 8.6* 8.6* 8.4*   HCT 25.8* 25.3* 26.3*   MCV 93.5 94.4 95.3   MCH 31.2 32.1 30.4   MCHC 33.3 34.0 31.9   RDW 13.2 13.5 13.5   NEPRELIM 10.01* 7.13 6.64   WBC 14.4* 10.3 10.4   PLT  284.0 281.0 337.0     Lab Results   Component Value Date    INR 1.62 (H) 05/16/2024       Recent Labs   Lab 05/13/24  2149 05/14/24  0242 05/15/24  0729 05/16/24  0626   * 129* 111* 114*  114*   BUN 38* 35* 21 21  21   CREATSERUM 1.21 1.07 0.76 0.82  0.82   CA 8.7 8.9 8.5* 8.6*  8.6*   ALB 4.0  --   --  3.5   * 128* 130* 130*  130*   K 4.2 4.4 3.7 3.5  3.5   CL 96* 96* 99 98  98   CO2 24.0 26.0 23.0 25.0  25.0   ALKPHO 85  --   --  94   AST 48*  --   --  36*   ALT 55*  --   --  38   BILT 2.7*  --   --  2.3*   TP 7.0  --   --  6.3             No results found.          Time spent in direct patient contact and decision making as well as counseling/coordination of care:    06425- 35 min      BALDEV LINDSEY JR., MD. [facility]  GENERAL SURGERY  [facility]    5/16/2024  12:37 PM

## 2024-05-16 NOTE — PROGRESS NOTES
[facility]  part of [facility]     Hospitalist Progress Note     Mohan Castañeda Patient Status:  Inpatient    1950 MRN [id number]   Location [facility] 5SW/SE Attending Andra Hoang MD   Hosp Day # 2 PCP Alden Faulkner MD     Subjective:     Patient laying comfortably in bed and in NAD. Denied any active complaints at the time of interview.   In good spirits.   No overnight events reported by the nursing staff.       Objective:    Review of Systems:   ROS completed; pertinent positive and negatives stated in subjective.      Vital signs:  Temp:  [97.6 °F (36.4 °C)-98.7 °F (37.1 °C)] 97.6 °F (36.4 °C)  Pulse:  [83-93] 83  Resp:  [16-18] 16  BP: (110-133)/(71-83) 115/75  SpO2:  [97 %-98 %] 98 %      Physical Exam:    Gen: NAD AO x3  Chest: good air entry CTABL  CVS: normal s1 and s2 RR  Abd: NABS soft NT ND, ecchymosis present  Neuro: CN 2-12 grossly intact  Ext: no edema in bilateral LE      Diagnostic Data:    Labs:  Recent Labs   Lab 05/13/24  2149 05/14/24  0242 05/15/24  0729 05/15/24  0730 24  0626   WBC 16.0* 14.4*  --  10.3 10.4   HGB 9.3* 8.6*  --  8.6* 8.4*   MCV 92.1 93.5  --  94.4 95.3   .0 284.0  --  281.0 337.0   BAND  --  1  --   --   --    INR 2.67* 2.69* 2.22*  --  1.62*       Recent Labs   Lab 05/13/24  2149 05/14/24  0242 05/15/24  0729 24  0626   * 129* 111* 114*  114*   BUN 38* 35* 21 21  21   CREATSERUM 1.21 1.07 0.76 0.82  0.82   CA 8.7 8.9 8.5* 8.6*  8.6*   ALB 4.0  --   --  3.5   * 128* 130* 130*  130*   K 4.2 4.4 3.7 3.5  3.5   CL 96* 96* 99 98  98   CO2 24.0 26.0 23.0 25.0  25.0   ALKPHO 85  --   --  94   AST 48*  --   --  36*   ALT 55*  --   --  38   BILT 2.7*  --   --  2.3*   TP 7.0  --   --  6.3       Estimated Creatinine Clearance: 80.4 mL/min (based on SCr of 0.82 mg/dL).    Recent Labs   Lab 24  0242 05/15/24  0729 24  0626   PTP 30.2* 26.0* 20.3*   INR 2.69* 2.22* 1.62*              Imaging:  Imaging data reviewed in Epic.    Medications:    warfarin  5 mg Oral Once at night    initial dose (ACS/Afib) heparin  1,000 Units/hr Intravenous Once    erythromycin  1 Application Both Eyes Daily    atorvastatin  20 mg Oral Daily    metoprolol tartrate  25 mg Oral BID       Assessment & Plan:     Large rectus sheath hematoma  Permanent afib - CHADsVASc 7  No active bleeding  CT reviewed  Monitor H&H, transfuse as indicated  Cardiology on consult  INR 1.62 today. Start heparin IV  Resume warfarin tonight - 5 mg  Continue metoprolol, statin  Telemetry monitoring  Gsx on consult  Okay to resume AC  Maintain INR 2-2.5   Outpatient follow up in 6 weeks  Discharge planning per cardiology  HFrEF - EF 40-45%  CAD s/p CABG  Hx of MR sp MV repair  Hx of TIA  HLD      Plan of care discussed with patient or family at bedside.      Supplementary Documentation:     Quality:  DVT Prophylaxis: SCDs  CODE status: Full      Estimated date of discharge: TBD  Discharge is dependent on: clinical stability  At this point Mr. Castañeda is expected to be discharge to: home            **Certification      PHYSICIAN Certification of Need for Inpatient Hospitalization - Initial Certification    Patient will require inpatient services that will reasonably be expected to span two midnight's based on the clinical documentation in H+P.   Based on patients current state of illness, I anticipate that, after discharge, patient will require TBD.      Andra Hoang MD  Hospitalist    MDM: High, I personally reviewed the available laboratories, imaging including CT. I discussed the case with RN. I ordered laboratories, studies including AM labs.  Medical decision making high, risk is high.       The 21st Century Cures Act makes medical notes like these available to patients in the interest of transparency. Please be advised this is a medical document. Medical documents are intended to carry relevant information, facts as evident, and the clinical  opinion of the practitioner. The medical note is intended as peer to peer communication and may appear blunt or direct. It is written in medical language and may contain abbreviations or verbiage that are unfamiliar.

## 2024-05-17 LAB
ALBUMIN SERPL-MCNC: 3.4 G/DL (ref 3.2–4.8)
ALBUMIN/GLOB SERPL: 1.3 {RATIO} (ref 1–2)
ALP LIVER SERPL-CCNC: 93 U/L
ALT SERPL-CCNC: 39 U/L
ANION GAP SERPL CALC-SCNC: 5 MMOL/L (ref 0–18)
APTT PPP: 66.3 SECONDS (ref 23–36)
AST SERPL-CCNC: 34 U/L (ref ?–34)
BASOPHILS # BLD AUTO: 0.06 X10(3) UL (ref 0–0.2)
BASOPHILS NFR BLD AUTO: 0.6 %
BILIRUB SERPL-MCNC: 2.1 MG/DL (ref 0.2–1.1)
BUN BLD-MCNC: 16 MG/DL (ref 9–23)
BUN/CREAT SERPL: 20.3 (ref 10–20)
CALCIUM BLD-MCNC: 8.4 MG/DL (ref 8.7–10.4)
CHLORIDE SERPL-SCNC: 101 MMOL/L (ref 98–112)
CO2 SERPL-SCNC: 27 MMOL/L (ref 21–32)
CREAT BLD-MCNC: 0.79 MG/DL
DEPRECATED RDW RBC AUTO: 47.2 FL (ref 35.1–46.3)
EGFRCR SERPLBLD CKD-EPI 2021: 93 ML/MIN/1.73M2 (ref 60–?)
EOSINOPHIL # BLD AUTO: 0.47 X10(3) UL (ref 0–0.7)
EOSINOPHIL NFR BLD AUTO: 4.4 %
ERYTHROCYTE [DISTWIDTH] IN BLOOD BY AUTOMATED COUNT: 13.9 % (ref 11–15)
GLOBULIN PLAS-MCNC: 2.7 G/DL (ref 2–3.5)
GLUCOSE BLD-MCNC: 101 MG/DL (ref 70–99)
HCT VFR BLD AUTO: 26.7 %
HGB BLD-MCNC: 8.5 G/DL
IMM GRANULOCYTES # BLD AUTO: 0.17 X10(3) UL (ref 0–1)
IMM GRANULOCYTES NFR BLD: 1.6 %
INR BLD: 1.41 (ref 0.8–1.2)
LYMPHOCYTES # BLD AUTO: 2 X10(3) UL (ref 1–4)
LYMPHOCYTES NFR BLD AUTO: 18.9 %
MCH RBC QN AUTO: 31.1 PG (ref 26–34)
MCHC RBC AUTO-ENTMCNC: 31.8 G/DL (ref 31–37)
MCV RBC AUTO: 97.8 FL
MONOCYTES # BLD AUTO: 1.38 X10(3) UL (ref 0.1–1)
MONOCYTES NFR BLD AUTO: 13.1 %
NEUTROPHILS # BLD AUTO: 6.49 X10 (3) UL (ref 1.5–7.7)
NEUTROPHILS # BLD AUTO: 6.49 X10(3) UL (ref 1.5–7.7)
NEUTROPHILS NFR BLD AUTO: 61.4 %
OSMOLALITY SERPL CALC.SUM OF ELEC: 277 MOSM/KG (ref 275–295)
PLATELET # BLD AUTO: 396 10(3)UL (ref 150–450)
POTASSIUM SERPL-SCNC: 3.6 MMOL/L (ref 3.5–5.1)
PROT SERPL-MCNC: 6.1 G/DL (ref 5.7–8.2)
PROTHROMBIN TIME: 18.2 SECONDS (ref 11.6–14.8)
RBC # BLD AUTO: 2.73 X10(6)UL
SODIUM SERPL-SCNC: 133 MMOL/L (ref 136–145)
WBC # BLD AUTO: 10.6 X10(3) UL (ref 4–11)

## 2024-05-17 PROCEDURE — 99233 SBSQ HOSP IP/OBS HIGH 50: CPT | Performed by: INTERNAL MEDICINE

## 2024-05-17 RX ORDER — WARFARIN SODIUM 5 MG/1
5 TABLET ORAL
Status: COMPLETED | OUTPATIENT
Start: 2024-05-17 | End: 2024-05-17

## 2024-05-17 NOTE — PROGRESS NOTES
Cardiology  Progress Note  [facility]    Mohan Castañeda Patient Status:  Inpatient    1950 MRN [id number]   Location [facility] 5SW/SE Attending Tj Alvarado MD   Hosp Day # 3 PCP Alden Faulkner MD     SUBJECTIVE:    No chest pain or dyspnea. Getting stronger.        Reason for Consultation:  Regarding anticoagulation.    History of Present Illness:  Mohan Castañeda is a a(n) 73 year old male PMH of CAD s/p CABG, chronic Afib on coumadin, MR s/p mitral valve repair, HFrEF, hx of previous DVT and hx of TIA who presents to ED on 24 with abodminal pain. Symptoms have been ongoing for ~5 days. Patient has been giving himself Lovenox injections to supplement his Coumadin in anticipation for right leg surgery that was canceled last week. Started noticing abdominal pain, swelling and bruising. Denies any chest pain, SOB or palpitations.   In ED, pt was hemodynamically stable. Hgb noted to down to 9 (from baseline of 15). CT abdomen showed large rectus sheath hematoma. Admitted for further evaluation.     Currently, pt reports doing ok. Denies any chest pain, SOB or palpitations. Remains hemodynamically stable.     History:  Past Medical History:    Abdominal wall hematoma    Status post embolization right inferior epigastric artery pseudoaneurysm    Aortic atherosclerosis (HCC)    CT scan     ASHD (arteriosclerotic heart disease)    Cath 11-15 with EF 55%, 85% left main, s/p CABG ×1 with LIMA graft to LAD 11-15    Atrial fibrillation (HCC)    Back problem    BPH (benign prostatic hyperplasia)    Deep vein thrombosis (HCC)    Blood Clot passed through Left Eye    Dyslipidemia    Hemorrhoids    Long term (current) use of anticoagulants    Mitral regurgitation    Severe, s/p mitral valve repair 11-15; Echo 10-23 with EF 45-50%, moderate SHERWIN, mild AI    Osteoarthritis    lower back    Prostate cancer (HCC)    Asim's grade 3+3; PSA 8.55    SVT (supraventricular tachycardia) (HCC)     TIA (transient ischemic attack)    Transient vision loss left eye; INR subtherapeutic; MRA carotids with possible narrowing right vertebral artery origin; Echo with EF 45-50%, moderate SHERWIN, mild AI     Past Surgical History:   Procedure Laterality Date    Cabg  2015    CABG ×1 with LIMA graft to LAD and mitral valve repair with ligation left atrial appendage    Hemorrhoidectomy      Other      Excision benign calcified nodules right shoulder    Other      Nasal fracture    Other  2019    Embolization right inferior epigastric artery pseudoaneurysm    Other surgical history Right     Right Upper chest/axilla region lysis of adhesions     Family History   Problem Relation Age of Onset    Other (CVA [Other]) Mother 56         age 56    Breast Cancer Mother     Dementia Father     Prostate Cancer Father     Diabetes Father     Hypertension Brother       reports that he quit smoking about 42 years ago. His smoking use included cigarettes. He started smoking about 57 years ago. He has a 22.5 pack-year smoking history. He has never used smokeless tobacco. He reports that he does not currently use drugs. He reports that he does not drink alcohol.    Allergies:  No Known Allergies    Medications:  No current facility-administered medications on file prior to encounter.     Current Outpatient Medications on File Prior to Encounter   Medication Sig Dispense Refill    warfarin 5 MG Oral Tab Take 1 tablet (5 mg total) by mouth nightly. 90 tablet 1    Ergocalciferol (VITAMIN D OR) Take 1,000 Units by mouth daily.        aspirin 81 MG Oral Tab EC Take 1 tablet (81 mg total) by mouth at bedtime.      Saw Palmetto, Serenoa repens, (SAW PALMETTO OR) Take 1 capsule by mouth daily.      NETTLE-PYGEUM AFRICANUM OR Take 1 capsule by mouth daily.      Cyanocobalamin (VITAMIN B-12 OR) Take by mouth daily.      metoprolol tartrate 25 MG Oral Tab Take 1 tablet (25 mg total) by mouth 2 (two) times daily. 180  tablet 1    ATORVASTATIN 20 MG Oral Tab Take 1 tablet by mouth once daily 90 tablet 0    Warfarin Sodium 2 MG Oral Tab Take 1 tablet (2 mg total) by mouth nightly. No coumadin tonight . Start taking on . INR on Friday 1/3, call MD for instructions on dose and the timing of the next INR (Patient taking differently: Take 2.5 mg by mouth nightly. No coumadin tonight . Start taking on . INR on Friday 1/3, call MD for instructions on dose and the timing of the next INR) 30 tablet 0           Physical Exam:  Blood pressure 127/82, pulse 77, temperature 97.9 °F (36.6 °C), temperature source Oral, resp. rate 16, height 5' 10\" (1.778 m), weight 203 lb 9.6 oz (92.4 kg), SpO2 98%.  Wt Readings from Last 3 Encounters:   24 203 lb 9.6 oz (92.4 kg)   24 195 lb (88.5 kg)   10/13/23 195 lb 12.8 oz (88.8 kg)       General: awake, alert, oriented x 3, no acute distress  HEENT: at/nc, perrl, eomi  Neck: supple  Cardiac: irregular rhythm, S1, S2 normal, no murmur, rub or gallop.  Lungs: Clear without wheezes, rales, rhonchi or dullness.  Normal excursions and effort.  Abdomen: Soft, distended, ecchymosis lower abdomen  Extremities: Without clubbing, cyanosis or edema.  Peripheral pulses are 2+.  Neurologic: Alert and oriented, normal affect.  Psych: normal mood and affect  Skin: Warm and dry.       Laboratories and Data:  Diagnostics:  EK24  Afib  with PVC, RBBB    Echo: 10/3/23  Conclusions:     1. Left ventricle: The cavity size was normal. Wall thickness was normal. Systolic function was mildly reduced. The estimated ejection fraction was 45-50%, by biplane method of disks. Technical limitations of the study preclude regional wall motion analysis. Unable to assess LV diastolic function due to heart rhythm.   2. Right ventricle: The cavity size was mildly increased. Systolic function was mildly reduced.   3. Left atrium: The atrium was moderately dilated.   4. Right atrium: The  atrium was moderately dilated.   5. Aortic valve: There was mild regurgitation.     Stress Test: 10/16/20  Summary:   1. Stress echo: There is no evidence for stress-induced ischemia.   2. Stress ECG conclusions: The stress ECG is negative for ischemia.       Labs:   CBC:    Lab Results   Component Value Date    WBC 10.6 05/17/2024    WBC 10.4 05/16/2024    WBC 10.3 05/15/2024     Lab Results   Component Value Date    HGB 8.5 (L) 05/17/2024    HGB 8.4 (L) 05/16/2024    HGB 8.6 (L) 05/15/2024      Lab Results   Component Value Date    .0 05/17/2024    .0 05/16/2024    .0 05/15/2024     BMP:   No results found for: \"GLUCOSE\"  Recent Labs   Lab 05/15/24  0729 05/16/24  0626 05/17/24  0658   * 114*  114* 101*   BUN 21 21  21 16   CREATSERUM 0.76 0.82  0.82 0.79   EGFRCR 94 92  92 93   CA 8.5* 8.6*  8.6* 8.4*   * 130*  130* 133*   K 3.7 3.5  3.5 3.6   CL 99 98  98 101   CO2 23.0 25.0  25.0 27.0     Lab Results   Component Value Date    INR 1.62 (H) 05/16/2024    INR 2.22 (H) 05/15/2024    INR 2.69 (H) 05/14/2024        Cholesterol:     Lab Results   Component Value Date    CHOLEST 148 10/02/2023    CHOLEST 149 04/04/2022    CHOLEST 142.00 09/30/2021     Lab Results   Component Value Date    HDL 56 10/02/2023    HDL 60 (H) 04/04/2022    HDL 54 09/30/2021     Lab Results   Component Value Date    TRIG 71 10/02/2023    TRIG 58 04/04/2022    TRIG 60.00 09/30/2021     Lab Results   Component Value Date    LDL 78 10/02/2023    LDL 77 04/04/2022    LDL 76 09/30/2021     Lab Results   Component Value Date    AST 34 05/17/2024    AST 36 (H) 05/16/2024    AST 48 (H) 05/13/2024     Lab Results   Component Value Date    ALT 39 05/17/2024    ALT 38 05/16/2024    ALT 55 (H) 05/13/2024       Assessment/Plan:    # Large rectus sheath hematoma: 15 x 11 x 17 cm. No active bleeding. Prior episode Dec 2019.   # Permanent afib - CHADs-VASC score 7  # HFrEF - last EF 40-45%  # CAD s/p CABG x 1v (LIMA  to LAD)  # Hx of MR s/p Mitral valve repair   # Hx of TIA  # HLD     Plan:    - on IV heparin, no bolus, with INR<2. Resumed warfarin yesterday. Await today's INR, if less than 2.0 then 5mg warfarin tonight. Hgb stable since IV heparin started.   - given that bleed occurred with therapeutic INR (taking warfarin) and full dose lovenox, will keep in hospital till INR 2.0 or higher so as to avoid lovenox  - Continue metoprolol 25mg BID  - Continue atorvastatin 20mg daily   - Monitor telemetry     Cardiology will follow.

## 2024-05-17 NOTE — PLAN OF CARE
Problem: Patient Centered Care  Goal: Patient preferences are identified and integrated in the patient's plan of care  Description: Interventions:  - What would you like us to know as we care for you? Lives alone  - Provide timely, complete, and accurate information to patient/family  - Incorporate patient and family knowledge, values, beliefs, and cultural backgrounds into the planning and delivery of care  - Encourage patient/family to participate in care and decision-making at the level they choose  - Honor patient and family perspectives and choices  Outcome: Progressing     Problem: GENITOURINARY - ADULT  Goal: Absence of urinary retention  Description: INTERVENTIONS:  - Assess patient’s ability to void and empty bladder  - Monitor intake/output and perform bladder scan as needed  - Follow urinary retention protocol/standard of care  - Consider collaborating with pharmacy to review patient's medication profile  - Implement strategies to promote bladder emptying  Outcome: Progressing     Problem: SKIN/TISSUE INTEGRITY - ADULT  Goal: Skin integrity remains intact  Description: INTERVENTIONS  - Assess and document risk factors for pressure ulcer development  - Assess and document skin integrity  - Monitor for areas of redness and/or skin breakdown  - Initiate interventions, skin care algorithm/standards of care as needed  Outcome: Progressing  Goal: Incision(s), wounds(s) or drain site(s) healing without S/S of infection  Description: INTERVENTIONS:  - Assess and document risk factors for pressure ulcer development  - Assess and document skin integrity  - Assess and document dressing/incision, wound bed, drain sites and surrounding tissue  - Implement wound care per orders  - Initiate isolation precautions as appropriate  - Initiate Pressure Ulcer prevention bundle as indicated  Outcome: Progressing     Problem: Patient/Family Goals  Goal: Patient/Family Long Term Goal  Description: Patient's Long Term Goal: to  discharge home    Interventions:  - follow plan of care  - monitor labs  - monitor patients condition  - See additional Care Plan goals for specific interventions  Outcome: Not Progressing  Goal: Patient/Family Short Term Goal  Description: Patient's Short Term Goal: pain relief    Interventions:   - administer meds as ordered  -monitor patient's condition  - See additional Care Plan goals for specific interventions  Outcome: Not Progressing     Problem: PAIN - ADULT  Goal: Verbalizes/displays adequate comfort level or patient's stated pain goal  Description: INTERVENTIONS:  - Encourage pt to monitor pain and request assistance  - Assess pain using appropriate pain scale  - Administer analgesics based on type and severity of pain and evaluate response  - Implement non-pharmacological measures as appropriate and evaluate response  - Consider cultural and social influences on pain and pain management  - Manage/alleviate anxiety  - Utilize distraction and/or relaxation techniques  - Monitor for opioid side effects  - Notify MD/LIP if interventions unsuccessful or patient reports new pain  - Anticipate increased pain with activity and pre-medicate as appropriate  Outcome: Not Progressing     Problem: CARDIOVASCULAR - ADULT  Goal: Maintains optimal cardiac output and hemodynamic stability  Description: INTERVENTIONS:  - Monitor vital signs, rhythm, and trends  - Monitor for bleeding, hypotension and signs of decreased cardiac output  - Evaluate effectiveness of vasoactive medications to optimize hemodynamic stability  - Monitor arterial and/or venous puncture sites for bleeding and/or hematoma  - Assess quality of pulses, skin color and temperature  - Assess for signs of decreased coronary artery perfusion - ex. Angina  - Evaluate fluid balance, assess for edema, trend weights  Outcome: Not Progressing  Goal: Absence of cardiac arrhythmias or at baseline  Description: INTERVENTIONS:  - Continuous cardiac monitoring,  monitor vital signs, obtain 12 lead EKG if indicated  - Evaluate effectiveness of antiarrhythmic and heart rate control medications as ordered  - Initiate emergency measures for life threatening arrhythmias  - Monitor electrolytes and administer replacement therapy as ordered  Outcome: Not Progressing     Problem: HEMATOLOGIC - ADULT  Goal: Maintains hematologic stability  Description: INTERVENTIONS  - Assess for signs and symptoms of bleeding or hemorrhage  - Monitor labs and vital signs for trends  - Administer supportive blood products/factors, fluids and medications as ordered and appropriate  - Administer supportive blood products/factors as ordered and appropriate  Outcome: Not Progressing  Goal: Free from bleeding injury  Description: (Example usage: patient with low platelets)  INTERVENTIONS:  - Avoid intramuscular injections, enemas and rectal medication administration  - Ensure safe mobilization of patient  - Hold pressure on venipuncture sites to achieve adequate hemostasis  - Assess for signs and symptoms of internal bleeding  - Monitor lab trends  - Patient is to report abnormal signs of bleeding to staff  - Avoid use of toothpicks and dental floss  - Use electric shaver for shaving  - Use soft bristle tooth brush  - Limit straining and forceful nose blowing  Outcome: Not Progressing

## 2024-05-17 NOTE — PROGRESS NOTES
[facility]  part of [facility]     Hospitalist Progress Note     Mohan Castañeda Patient Status:  Inpatient    1950 MRN [id number]   Location [facility] 5SW/SE Attending Andra Hoang MD   Hosp Day # 3 PCP Alden Faulkner MD     Subjective:     Patient laying comfortably in bed and in NAD. Denied any active complaints at the time of interview.   In good spirits. All questions and concerns addressed.  No events reported by the nursing staff.      Objective:    Review of Systems:   ROS completed; pertinent positive and negatives stated in subjective.      Vital signs:  Temp:  [97.9 °F (36.6 °C)-98.9 °F (37.2 °C)] 97.9 °F (36.6 °C)  Pulse:  [77-94] 77  Resp:  [16-18] 16  BP: (110-127)/(66-82) 127/82  SpO2:  [96 %-98 %] 98 %      Physical Exam:    Gen: NAD AO x3  Chest: good air entry CTABL  CVS: normal s1 and s2 RR  Abd: NABS soft NT ND, ecchymosis present  Neuro: CN 2-12 grossly intact  Ext: no edema in bilateral LE      Diagnostic Data:    Labs:  Recent Labs   Lab 05/13/24  2149 05/14/24  0242 05/15/24  0729 05/15/24  0730 05/16/24  0626 05/17/24  0658   WBC 16.0* 14.4*  --  10.3 10.4 10.6   HGB 9.3* 8.6*  --  8.6* 8.4* 8.5*   MCV 92.1 93.5  --  94.4 95.3 97.8   .0 284.0  --  281.0 337.0 396.0   BAND  --  1  --   --   --   --    INR 2.67* 2.69* 2.22*  --  1.62* 1.41*       Recent Labs   Lab 05/13/24  2149 05/14/24  0242 05/15/24  0729 05/16/24  0624  0658   *   < > 111* 114*  114* 101*   BUN 38*   < > 21 21  21 16   CREATSERUM 1.21   < > 0.76 0.82  0.82 0.79   CA 8.7   < > 8.5* 8.6*  8.6* 8.4*   ALB 4.0  --   --  3.5 3.4   *   < > 130* 130*  130* 133*   K 4.2   < > 3.7 3.5  3.5 3.6   CL 96*   < > 99 98  98 101   CO2 24.0   < > 23.0 25.0  25.0 27.0   ALKPHO 85  --   --  94 93   AST 48*  --   --  36* 34   ALT 55*  --   --  38 39   BILT 2.7*  --   --  2.3* 2.1*   TP 7.0  --   --  6.3 6.1    < > = values in this interval not displayed.        Estimated Creatinine Clearance: 83.4 mL/min (based on SCr of 0.79 mg/dL).    Recent Labs   Lab 05/15/24  0729 05/16/24  0626 05/17/24  0658   PTP 26.0* 20.3* 18.2*   INR 2.22* 1.62* 1.41*              Imaging: Imaging data reviewed in Epic.    Medications:    erythromycin  1 Application Both Eyes Daily    atorvastatin  20 mg Oral Daily    metoprolol tartrate  25 mg Oral BID       Assessment & Plan:     Large rectus sheath hematoma  Permanent afib - CHADsVASc 7  No active bleeding  CT reviewed  Monitor H&H, transfuse as indicated  Cardiology on consult  INR 1.62 -> 1.41. Continue heparin IV  Continue warfarin. Increase to 5mg nightly if INR <2  Continue metoprolol, statin  Telemetry monitoring  Gsx on consult  Okay to resume AC  Maintain INR 2-2.5   Outpatient follow up in 6 weeks  Discharge planning per cardiology - appreciate recs  HFrEF - EF 40-45%  CAD s/p CABG  Hx of MR sp MV repair  Hx of TIA  HLD      Plan of care discussed with patient or family at bedside.      Supplementary Documentation:     Quality:  DVT Prophylaxis: SCDs  CODE status: Full      Estimated date of discharge: TBD  Discharge is dependent on: clinical stability  At this point Mr. Castañeda is expected to be discharge to: home            **Certification      PHYSICIAN Certification of Need for Inpatient Hospitalization - Initial Certification    Patient will require inpatient services that will reasonably be expected to span two midnight's based on the clinical documentation in H+P.   Based on patients current state of illness, I anticipate that, after discharge, patient will require TBD.      Andra Hoang MD  Hospitalist    MDM: High, I personally reviewed the available laboratories, imaging including CT. I discussed the case with RN. I ordered laboratories, studies including AM labs.  Medical decision making high, risk is high.       The 21st Century Cures Act makes medical notes like these available to patients in the interest of  transparency. Please be advised this is a medical document. Medical documents are intended to carry relevant information, facts as evident, and the clinical opinion of the practitioner. The medical note is intended as peer to peer communication and may appear blunt or direct. It is written in medical language and may contain abbreviations or verbiage that are unfamiliar.

## 2024-05-17 NOTE — PLAN OF CARE
Patient up and about in room, heparin drip @10cc/hr, next ptt in am.  Problem: Patient Centered Care  Goal: Patient preferences are identified and integrated in the patient's plan of care  Description: Interventions:  - What would you like us to know as we care for you? Lives alone  - Provide timely, complete, and accurate information to patient/family  - Incorporate patient and family knowledge, values, beliefs, and cultural backgrounds into the planning and delivery of care  - Encourage patient/family to participate in care and decision-making at the level they choose  - Honor patient and family perspectives and choices  Outcome: Progressing     Problem: Patient/Family Goals  Goal: Patient/Family Long Term Goal  Description: Patient's Long Term Goal: to discharge home    Interventions:  - follow plan of care  - monitor labs  - monitor patients condition  - See additional Care Plan goals for specific interventions  Outcome: Progressing  Goal: Patient/Family Short Term Goal  Description: Patient's Short Term Goal: pain relief    Interventions:   - administer meds as ordered  -monitor patient's condition  - See additional Care Plan goals for specific interventions  Outcome: Progressing     Problem: PAIN - ADULT  Goal: Verbalizes/displays adequate comfort level or patient's stated pain goal  Description: INTERVENTIONS:  - Encourage pt to monitor pain and request assistance  - Assess pain using appropriate pain scale  - Administer analgesics based on type and severity of pain and evaluate response  - Implement non-pharmacological measures as appropriate and evaluate response  - Consider cultural and social influences on pain and pain management  - Manage/alleviate anxiety  - Utilize distraction and/or relaxation techniques  - Monitor for opioid side effects  - Notify MD/LIP if interventions unsuccessful or patient reports new pain  - Anticipate increased pain with activity and pre-medicate as appropriate  Outcome:  Progressing     Problem: CARDIOVASCULAR - ADULT  Goal: Maintains optimal cardiac output and hemodynamic stability  Description: INTERVENTIONS:  - Monitor vital signs, rhythm, and trends  - Monitor for bleeding, hypotension and signs of decreased cardiac output  - Evaluate effectiveness of vasoactive medications to optimize hemodynamic stability  - Monitor arterial and/or venous puncture sites for bleeding and/or hematoma  - Assess quality of pulses, skin color and temperature  - Assess for signs of decreased coronary artery perfusion - ex. Angina  - Evaluate fluid balance, assess for edema, trend weights  Outcome: Progressing  Goal: Absence of cardiac arrhythmias or at baseline  Description: INTERVENTIONS:  - Continuous cardiac monitoring, monitor vital signs, obtain 12 lead EKG if indicated  - Evaluate effectiveness of antiarrhythmic and heart rate control medications as ordered  - Initiate emergency measures for life threatening arrhythmias  - Monitor electrolytes and administer replacement therapy as ordered  Outcome: Progressing     Problem: GENITOURINARY - ADULT  Goal: Absence of urinary retention  Description: INTERVENTIONS:  - Assess patient’s ability to void and empty bladder  - Monitor intake/output and perform bladder scan as needed  - Follow urinary retention protocol/standard of care  - Consider collaborating with pharmacy to review patient's medication profile  - Implement strategies to promote bladder emptying  Outcome: Progressing     Problem: SKIN/TISSUE INTEGRITY - ADULT  Goal: Skin integrity remains intact  Description: INTERVENTIONS  - Assess and document risk factors for pressure ulcer development  - Assess and document skin integrity  - Monitor for areas of redness and/or skin breakdown  - Initiate interventions, skin care algorithm/standards of care as needed  Outcome: Progressing  Goal: Incision(s), wounds(s) or drain site(s) healing without S/S of infection  Description: INTERVENTIONS:  -  Assess and document risk factors for pressure ulcer development  - Assess and document skin integrity  - Assess and document dressing/incision, wound bed, drain sites and surrounding tissue  - Implement wound care per orders  - Initiate isolation precautions as appropriate  - Initiate Pressure Ulcer prevention bundle as indicated  Outcome: Progressing  Goal: Oral mucous membranes remain intact  Description: INTERVENTIONS  - Assess oral mucosa and hygiene practices  - Implement preventative oral hygiene regimen  - Implement oral medicated treatments as ordered  Outcome: Progressing     Problem: HEMATOLOGIC - ADULT  Goal: Maintains hematologic stability  Description: INTERVENTIONS  - Assess for signs and symptoms of bleeding or hemorrhage  - Monitor labs and vital signs for trends  - Administer supportive blood products/factors, fluids and medications as ordered and appropriate  - Administer supportive blood products/factors as ordered and appropriate  Outcome: Progressing  Goal: Free from bleeding injury  Description: (Example usage: patient with low platelets)  INTERVENTIONS:  - Avoid intramuscular injections, enemas and rectal medication administration  - Ensure safe mobilization of patient  - Hold pressure on venipuncture sites to achieve adequate hemostasis  - Assess for signs and symptoms of internal bleeding  - Monitor lab trends  - Patient is to report abnormal signs of bleeding to staff  - Avoid use of toothpicks and dental floss  - Use electric shaver for shaving  - Use soft bristle tooth brush  - Limit straining and forceful nose blowing  Outcome: Progressing

## 2024-05-17 NOTE — PLAN OF CARE
Problem: Patient Centered Care  Goal: Patient preferences are identified and integrated in the patient's plan of care  Description: Interventions:  - What would you like us to know as we care for you? Lives alone  - Provide timely, complete, and accurate information to patient/family  - Incorporate patient and family knowledge, values, beliefs, and cultural backgrounds into the planning and delivery of care  - Encourage patient/family to participate in care and decision-making at the level they choose  - Honor patient and family perspectives and choices  Outcome: Progressing     Problem: Patient/Family Goals  Goal: Patient/Family Long Term Goal  Description: Patient's Long Term Goal: to discharge home    Interventions:  - follow plan of care  - monitor labs  - monitor patients condition  - See additional Care Plan goals for specific interventions  Outcome: Progressing  Goal: Patient/Family Short Term Goal  Description: Patient's Short Term Goal: pain relief    Interventions:   - administer meds as ordered  -monitor patient's condition  - See additional Care Plan goals for specific interventions  Outcome: Progressing     Problem: PAIN - ADULT  Goal: Verbalizes/displays adequate comfort level or patient's stated pain goal  Description: INTERVENTIONS:  - Encourage pt to monitor pain and request assistance  - Assess pain using appropriate pain scale  - Administer analgesics based on type and severity of pain and evaluate response  - Implement non-pharmacological measures as appropriate and evaluate response  - Consider cultural and social influences on pain and pain management  - Manage/alleviate anxiety  - Utilize distraction and/or relaxation techniques  - Monitor for opioid side effects  - Notify MD/LIP if interventions unsuccessful or patient reports new pain  - Anticipate increased pain with activity and pre-medicate as appropriate  Outcome: Progressing     Problem: CARDIOVASCULAR - ADULT  Goal: Maintains optimal  cardiac output and hemodynamic stability  Description: INTERVENTIONS:  - Monitor vital signs, rhythm, and trends  - Monitor for bleeding, hypotension and signs of decreased cardiac output  - Evaluate effectiveness of vasoactive medications to optimize hemodynamic stability  - Monitor arterial and/or venous puncture sites for bleeding and/or hematoma  - Assess quality of pulses, skin color and temperature  - Assess for signs of decreased coronary artery perfusion - ex. Angina  - Evaluate fluid balance, assess for edema, trend weights  Outcome: Progressing  Goal: Absence of cardiac arrhythmias or at baseline  Description: INTERVENTIONS:  - Continuous cardiac monitoring, monitor vital signs, obtain 12 lead EKG if indicated  - Evaluate effectiveness of antiarrhythmic and heart rate control medications as ordered  - Initiate emergency measures for life threatening arrhythmias  - Monitor electrolytes and administer replacement therapy as ordered  Outcome: Progressing     Problem: GENITOURINARY - ADULT  Goal: Absence of urinary retention  Description: INTERVENTIONS:  - Assess patient’s ability to void and empty bladder  - Monitor intake/output and perform bladder scan as needed  - Follow urinary retention protocol/standard of care  - Consider collaborating with pharmacy to review patient's medication profile  - Implement strategies to promote bladder emptying  Outcome: Progressing     Problem: SKIN/TISSUE INTEGRITY - ADULT  Goal: Skin integrity remains intact  Description: INTERVENTIONS  - Assess and document risk factors for pressure ulcer development  - Assess and document skin integrity  - Monitor for areas of redness and/or skin breakdown  - Initiate interventions, skin care algorithm/standards of care as needed  Outcome: Progressing  Goal: Incision(s), wounds(s) or drain site(s) healing without S/S of infection  Description: INTERVENTIONS:  - Assess and document risk factors for pressure ulcer development  - Assess  and document skin integrity  - Assess and document dressing/incision, wound bed, drain sites and surrounding tissue  - Implement wound care per orders  - Initiate isolation precautions as appropriate  - Initiate Pressure Ulcer prevention bundle as indicated  Outcome: Progressing  Goal: Oral mucous membranes remain intact  Description: INTERVENTIONS  - Assess oral mucosa and hygiene practices  - Implement preventative oral hygiene regimen  - Implement oral medicated treatments as ordered  Outcome: Progressing     Problem: HEMATOLOGIC - ADULT  Goal: Maintains hematologic stability  Description: INTERVENTIONS  - Assess for signs and symptoms of bleeding or hemorrhage  - Monitor labs and vital signs for trends  - Administer supportive blood products/factors, fluids and medications as ordered and appropriate  - Administer supportive blood products/factors as ordered and appropriate  Outcome: Progressing  Goal: Free from bleeding injury  Description: (Example usage: patient with low platelets)  INTERVENTIONS:  - Avoid intramuscular injections, enemas and rectal medication administration  - Ensure safe mobilization of patient  - Hold pressure on venipuncture sites to achieve adequate hemostasis  - Assess for signs and symptoms of internal bleeding  - Monitor lab trends  - Patient is to report abnormal signs of bleeding to staff  - Avoid use of toothpicks and dental floss  - Use electric shaver for shaving  - Use soft bristle tooth brush  - Limit straining and forceful nose blowing  Outcome: Progressing   Heparin drip started today per orders, on remote tele, PTT this evening therapeutic-next PTT tomorrow AM, bed kept low and locked, call light left within reach.

## 2024-05-18 LAB
ALBUMIN SERPL-MCNC: 3.3 G/DL (ref 3.2–4.8)
ALBUMIN/GLOB SERPL: 1.2 {RATIO} (ref 1–2)
ALP LIVER SERPL-CCNC: 89 U/L
ALT SERPL-CCNC: 35 U/L
ANION GAP SERPL CALC-SCNC: 5 MMOL/L (ref 0–18)
APTT PPP: 64.2 SECONDS (ref 23–36)
AST SERPL-CCNC: 28 U/L (ref ?–34)
BASOPHILS # BLD AUTO: 0.04 X10(3) UL (ref 0–0.2)
BASOPHILS NFR BLD AUTO: 0.4 %
BILIRUB SERPL-MCNC: 1.7 MG/DL (ref 0.2–1.1)
BUN BLD-MCNC: 15 MG/DL (ref 9–23)
BUN/CREAT SERPL: 20.8 (ref 10–20)
CALCIUM BLD-MCNC: 8.2 MG/DL (ref 8.7–10.4)
CHLORIDE SERPL-SCNC: 103 MMOL/L (ref 98–112)
CO2 SERPL-SCNC: 27 MMOL/L (ref 21–32)
CREAT BLD-MCNC: 0.72 MG/DL
DEPRECATED RDW RBC AUTO: 48.1 FL (ref 35.1–46.3)
EGFRCR SERPLBLD CKD-EPI 2021: 95 ML/MIN/1.73M2 (ref 60–?)
EOSINOPHIL # BLD AUTO: 0.28 X10(3) UL (ref 0–0.7)
EOSINOPHIL NFR BLD AUTO: 3 %
ERYTHROCYTE [DISTWIDTH] IN BLOOD BY AUTOMATED COUNT: 15.1 % (ref 11–15)
GLOBULIN PLAS-MCNC: 2.7 G/DL (ref 2–3.5)
GLUCOSE BLD-MCNC: 100 MG/DL (ref 70–99)
HCT VFR BLD AUTO: 25.5 %
HGB BLD-MCNC: 8.2 G/DL
IMM GRANULOCYTES # BLD AUTO: 0.14 X10(3) UL (ref 0–1)
IMM GRANULOCYTES NFR BLD: 1.5 %
INR BLD: 1.66 (ref 0.8–1.2)
INR BLD: 1.7 (ref 0.8–1.2)
LYMPHOCYTES # BLD AUTO: 1.38 X10(3) UL (ref 1–4)
LYMPHOCYTES NFR BLD AUTO: 14.8 %
MCH RBC QN AUTO: 30.9 PG (ref 26–34)
MCHC RBC AUTO-ENTMCNC: 32.2 G/DL (ref 31–37)
MCV RBC AUTO: 96.2 FL
MONOCYTES # BLD AUTO: 1.01 X10(3) UL (ref 0.1–1)
MONOCYTES NFR BLD AUTO: 10.8 %
NEUTROPHILS # BLD AUTO: 6.49 X10 (3) UL (ref 1.5–7.7)
NEUTROPHILS # BLD AUTO: 6.49 X10(3) UL (ref 1.5–7.7)
NEUTROPHILS NFR BLD AUTO: 69.5 %
OSMOLALITY SERPL CALC.SUM OF ELEC: 281 MOSM/KG (ref 275–295)
PLATELET # BLD AUTO: 404 10(3)UL (ref 150–450)
PLATELET # BLD AUTO: 404 10(3)UL (ref 150–450)
POTASSIUM SERPL-SCNC: 3.8 MMOL/L (ref 3.5–5.1)
PROT SERPL-MCNC: 6 G/DL (ref 5.7–8.2)
PROTHROMBIN TIME: 20.6 SECONDS (ref 11.6–14.8)
PROTHROMBIN TIME: 21 SECONDS (ref 11.6–14.8)
RBC # BLD AUTO: 2.65 X10(6)UL
SODIUM SERPL-SCNC: 135 MMOL/L (ref 136–145)
WBC # BLD AUTO: 9.3 X10(3) UL (ref 4–11)

## 2024-05-18 PROCEDURE — 99233 SBSQ HOSP IP/OBS HIGH 50: CPT | Performed by: INTERNAL MEDICINE

## 2024-05-18 RX ORDER — WARFARIN SODIUM 5 MG/1
5 TABLET ORAL NIGHTLY
Status: DISCONTINUED | OUTPATIENT
Start: 2024-05-18 | End: 2024-05-20

## 2024-05-18 RX ORDER — WARFARIN SODIUM 5 MG/1
5 TABLET ORAL
Status: DISCONTINUED | OUTPATIENT
Start: 2024-05-18 | End: 2024-05-18

## 2024-05-18 NOTE — PLAN OF CARE
Patient alert, oriented, up walking in hallway, continue heparin drip at 10ml/hr, ptt at therapeutic range next ptt tomorrow morning, INR 1.70 , no c/o pain, family at bedside, pt safe in the environment.  Problem: Patient Centered Care  Goal: Patient preferences are identified and integrated in the patient's plan of care  Description: Interventions:  - What would you like us to know as we care for you? Lives alone  - Provide timely, complete, and accurate information to patient/family  - Incorporate patient and family knowledge, values, beliefs, and cultural backgrounds into the planning and delivery of care  - Encourage patient/family to participate in care and decision-making at the level they choose  - Honor patient and family perspectives and choices  Outcome: Progressing

## 2024-05-18 NOTE — PROGRESS NOTES
[facility]  part of [facility]     Hospitalist Progress Note     Mohan Castañeda Patient Status:  Inpatient    1950 MRN [id number]   Location [facility] 5SW/SE Attending Andra Hoang MD   Hosp Day # 4 PCP Alden Faulkner MD     Subjective:     Patient laying comfortably in bed and in NAD. Denied any active complaints at the time of interview.   No events reported by the nursing staff.  In good spirits this morning and curious about discharge plans.    Objective:    Review of Systems:   ROS completed; pertinent positive and negatives stated in subjective.      Vital signs:  Temp:  [97.5 °F (36.4 °C)-98.4 °F (36.9 °C)] 98.4 °F (36.9 °C)  Pulse:  [] 88  Resp:  [18-22] 18  BP: (112-135)/(71-84) 115/71  SpO2:  [94 %-99 %] 94 %      Physical Exam:    Gen: NAD AO x3  Chest: good air entry CTABL  CVS: normal s1 and s2 RR  Abd: NABS soft NT ND, ecchymosis present  Neuro: CN 2-12 grossly intact  Ext: no edema in bilateral LE      Diagnostic Data:    Labs:  Recent Labs   Lab 05/13/24  2149 05/14/24  0242 05/15/24  0729 05/15/24  0730 24  0658   WBC 16.0* 14.4*  --  10.3 10.4 10.6   HGB 9.3* 8.6*  --  8.6* 8.4* 8.5*   MCV 92.1 93.5  --  94.4 95.3 97.8   .0 284.0  --  281.0 337.0 396.0   BAND  --  1  --   --   --   --    INR 2.67* 2.69* 2.22*  --  1.62* 1.41*       Recent Labs   Lab 05/13/24  2149 05/14/24  0242 05/15/24  0729 05/16/24  0624  0658   *   < > 111* 114*  114* 101*   BUN 38*   < > 21 21  21 16   CREATSERUM 1.21   < > 0.76 0.82  0.82 0.79   CA 8.7   < > 8.5* 8.6*  8.6* 8.4*   ALB 4.0  --   --  3.5 3.4   *   < > 130* 130*  130* 133*   K 4.2   < > 3.7 3.5  3.5 3.6   CL 96*   < > 99 98  98 101   CO2 24.0   < > 23.0 25.0  25.0 27.0   ALKPHO 85  --   --  94 93   AST 48*  --   --  36* 34   ALT 55*  --   --  38 39   BILT 2.7*  --   --  2.3* 2.1*   TP 7.0  --   --  6.3 6.1    < > = values in this interval not displayed.        Estimated Creatinine Clearance: 83.4 mL/min (based on SCr of 0.79 mg/dL).    Recent Labs   Lab 05/15/24  0729 05/16/24  0626 05/17/24  0658   PTP 26.0* 20.3* 18.2*   INR 2.22* 1.62* 1.41*              Imaging: Imaging data reviewed in Epic.    Medications:    erythromycin  1 Application Both Eyes Daily    atorvastatin  20 mg Oral Daily    metoprolol tartrate  25 mg Oral BID       Assessment & Plan:     Large rectus sheath hematoma  Permanent afib - CHADsVASc 7  No active bleeding  CT reviewed  Monitor H&H, transfuse as indicated  Cardiology on consult  INR 1.62 -> 1.41. Continue heparin IV  Warfarin started 5/17/2024  Continue metoprolol, statin  Telemetry monitoring  Maintain hospitalization until INR >2.0 so as to avoid lovenox  Daily INR checks  Gsx on consult  Okay to resume AC  Maintain INR 2-2.5   Outpatient follow up in 6 weeks  Discharge planning per cardiology - appreciate recs  Coumadin dosing per cardiology  HFrEF - EF 40-45%  CAD s/p CABG  Hx of MR sp MV repair  Hx of TIA  HLD      Plan of care discussed with patient or family at bedside.      Supplementary Documentation:     Quality:  DVT Prophylaxis: Warfarin  CODE status: Full      Estimated date of discharge: TBD  Discharge is dependent on: clinical stability  At this point Mr. Castañeda is expected to be discharge to: home            **Certification      PHYSICIAN Certification of Need for Inpatient Hospitalization - Initial Certification    Patient will require inpatient services that will reasonably be expected to span two midnight's based on the clinical documentation in H+P.   Based on patients current state of illness, I anticipate that, after discharge, patient will require TBD.      Andra Hoang MD  Hospitalist    MDM: High, I personally reviewed the available laboratories, imaging including CT. I discussed the case with RN. I ordered laboratories, studies including AM labs.  Medical decision making high, risk is high.       The 21st  Century Cures Act makes medical notes like these available to patients in the interest of transparency. Please be advised this is a medical document. Medical documents are intended to carry relevant information, facts as evident, and the clinical opinion of the practitioner. The medical note is intended as peer to peer communication and may appear blunt or direct. It is written in medical language and may contain abbreviations or verbiage that are unfamiliar.

## 2024-05-18 NOTE — PROGRESS NOTES
Cardiology  Progress Note  [facility]    Mohan Castañeda Patient Status:  Inpatient    1950 MRN [id number]   Location [facility] 5SW/SE Attending Tj Alvarado MD   Hosp Day # 4 PCP Alden Faulkner MD     SUBJECTIVE:    No chest pain or dyspnea. Getting stronger. Lab work today still pending, INR yesterday 1.4 warfarin restarted.        Reason for Consultation:  Regarding anticoagulation.    History of Present Illness:  Mohan Castañeda is a a(n) 73 year old male PMH of CAD s/p CABG, chronic Afib on coumadin, MR s/p mitral valve repair, HFrEF, hx of previous DVT and hx of TIA who presents to ED on 24 with abodminal pain. Symptoms have been ongoing for ~5 days. Patient has been giving himself Lovenox injections to supplement his Coumadin in anticipation for right leg surgery that was canceled last week. Started noticing abdominal pain, swelling and bruising. Denies any chest pain, SOB or palpitations.   In ED, pt was hemodynamically stable. Hgb noted to down to 9 (from baseline of 15). CT abdomen showed large rectus sheath hematoma. Admitted for further evaluation.     Currently, pt reports doing ok. Denies any chest pain, SOB or palpitations. Remains hemodynamically stable.     History:  Past Medical History:    Abdominal wall hematoma    Status post embolization right inferior epigastric artery pseudoaneurysm    Aortic atherosclerosis (HCC)    CT scan -    ASHD (arteriosclerotic heart disease)    Cath 11-15 with EF 55%, 85% left main, s/p CABG ×1 with LIMA graft to LAD 11-15    Atrial fibrillation (HCC)    Back problem    BPH (benign prostatic hyperplasia)    Deep vein thrombosis (HCC)    Blood Clot passed through Left Eye    Dyslipidemia    Hemorrhoids    Long term (current) use of anticoagulants    Mitral regurgitation    Severe, s/p mitral valve repair 11-15; Echo 10-23 with EF 45-50%, moderate SHERWIN, mild AI    Osteoarthritis    lower back    Prostate cancer (HCC)     Asim's grade 3+3; PSA 8.55    SVT (supraventricular tachycardia) (HCC)    TIA (transient ischemic attack)    Transient vision loss left eye; INR subtherapeutic; MRA carotids with possible narrowing right vertebral artery origin; Echo with EF 45-50%, moderate SHERWIN, mild AI     Past Surgical History:   Procedure Laterality Date    Cabg  2015    CABG ×1 with LIMA graft to LAD and mitral valve repair with ligation left atrial appendage    Hemorrhoidectomy      Other      Excision benign calcified nodules right shoulder    Other      Nasal fracture    Other  2019    Embolization right inferior epigastric artery pseudoaneurysm    Other surgical history Right     Right Upper chest/axilla region lysis of adhesions     Family History   Problem Relation Age of Onset    Other (CVA [Other]) Mother 56         age 56    Breast Cancer Mother     Dementia Father     Prostate Cancer Father     Diabetes Father     Hypertension Brother       reports that he quit smoking about 42 years ago. His smoking use included cigarettes. He started smoking about 57 years ago. He has a 22.5 pack-year smoking history. He has never used smokeless tobacco. He reports that he does not currently use drugs. He reports that he does not drink alcohol.    Allergies:  No Known Allergies    Medications:  No current facility-administered medications on file prior to encounter.     Current Outpatient Medications on File Prior to Encounter   Medication Sig Dispense Refill    warfarin 5 MG Oral Tab Take 1 tablet (5 mg total) by mouth nightly. 90 tablet 1    Ergocalciferol (VITAMIN D OR) Take 1,000 Units by mouth daily.        aspirin 81 MG Oral Tab EC Take 1 tablet (81 mg total) by mouth at bedtime.      Saw Palmetto, Serenoa repens, (SAW PALMETTO OR) Take 1 capsule by mouth daily.      NETTLE-PYGEUM AFRICANUM OR Take 1 capsule by mouth daily.      Cyanocobalamin (VITAMIN B-12 OR) Take by mouth daily.      metoprolol tartrate 25 MG  Oral Tab Take 1 tablet (25 mg total) by mouth 2 (two) times daily. 180 tablet 1    ATORVASTATIN 20 MG Oral Tab Take 1 tablet by mouth once daily 90 tablet 0    Warfarin Sodium 2 MG Oral Tab Take 1 tablet (2 mg total) by mouth nightly. No coumadin tonight . Start taking on . INR on Friday 1/3, call MD for instructions on dose and the timing of the next INR (Patient taking differently: Take 2.5 mg by mouth nightly. No coumadin tonight . Start taking on . INR on Friday 1/3, call MD for instructions on dose and the timing of the next INR) 30 tablet 0           Physical Exam:  Blood pressure 115/71, pulse 88, temperature 98.4 °F (36.9 °C), temperature source Oral, resp. rate 18, height 5' 10\" (1.778 m), weight 195 lb 12.8 oz (88.8 kg), SpO2 94%.  Wt Readings from Last 3 Encounters:   24 195 lb 12.8 oz (88.8 kg)   24 195 lb (88.5 kg)   10/13/23 195 lb 12.8 oz (88.8 kg)       General: awake, alert, oriented x 3, no acute distress  HEENT: at/nc, perrl, eomi  Neck: supple  Cardiac: irregular rhythm, S1, S2 normal, no murmur, rub or gallop.  Lungs: Clear without wheezes, rales, rhonchi or dullness.  Normal excursions and effort.  Abdomen: Soft, distended, ecchymosis lower abdomen  Extremities: Without clubbing, cyanosis or edema.  Peripheral pulses are 2+.  Neurologic: Alert and oriented, normal affect.  Psych: normal mood and affect  Skin: Warm and dry.       Laboratories and Data:  Diagnostics:  EK24  Afib  with PVC, RBBB    Echo: 10/3/23  Conclusions:     1. Left ventricle: The cavity size was normal. Wall thickness was normal. Systolic function was mildly reduced. The estimated ejection fraction was 45-50%, by biplane method of disks. Technical limitations of the study preclude regional wall motion analysis. Unable to assess LV diastolic function due to heart rhythm.   2. Right ventricle: The cavity size was mildly increased. Systolic function was mildly reduced.    3. Left atrium: The atrium was moderately dilated.   4. Right atrium: The atrium was moderately dilated.   5. Aortic valve: There was mild regurgitation.     Stress Test: 10/16/20  Summary:   1. Stress echo: There is no evidence for stress-induced ischemia.   2. Stress ECG conclusions: The stress ECG is negative for ischemia.       Labs:   CBC:    Lab Results   Component Value Date    WBC 9.3 05/18/2024    WBC 10.6 05/17/2024    WBC 10.4 05/16/2024     Lab Results   Component Value Date    HGB 8.2 (L) 05/18/2024    HGB 8.5 (L) 05/17/2024    HGB 8.4 (L) 05/16/2024      Lab Results   Component Value Date    .0 05/18/2024    .0 05/18/2024    .0 05/17/2024     BMP:   No results found for: \"GLUCOSE\"  Recent Labs   Lab 05/15/24  0729 05/16/24  0626 05/17/24  0658   * 114*  114* 101*   BUN 21 21  21 16   CREATSERUM 0.76 0.82  0.82 0.79   EGFRCR 94 92  92 93   CA 8.5* 8.6*  8.6* 8.4*   * 130*  130* 133*   K 3.7 3.5  3.5 3.6   CL 99 98  98 101   CO2 23.0 25.0  25.0 27.0     Lab Results   Component Value Date    INR 1.41 (H) 05/17/2024    INR 1.62 (H) 05/16/2024    INR 2.22 (H) 05/15/2024        Cholesterol:     Lab Results   Component Value Date    CHOLEST 148 10/02/2023    CHOLEST 149 04/04/2022    CHOLEST 142.00 09/30/2021     Lab Results   Component Value Date    HDL 56 10/02/2023    HDL 60 (H) 04/04/2022    HDL 54 09/30/2021     Lab Results   Component Value Date    TRIG 71 10/02/2023    TRIG 58 04/04/2022    TRIG 60.00 09/30/2021     Lab Results   Component Value Date    LDL 78 10/02/2023    LDL 77 04/04/2022    LDL 76 09/30/2021     Lab Results   Component Value Date    AST 34 05/17/2024    AST 36 (H) 05/16/2024    AST 48 (H) 05/13/2024     Lab Results   Component Value Date    ALT 39 05/17/2024    ALT 38 05/16/2024    ALT 55 (H) 05/13/2024       Assessment/Plan:    # Large rectus sheath hematoma: 15 x 11 x 17 cm. No active bleeding. Prior episode Dec 2019.   # Permanent  afib - CHADs-VASC score 7  # HFrEF - last EF 40-45%  # CAD s/p CABG x 1v (LIMA to LAD)  # Hx of MR s/p Mitral valve repair   # Hx of TIA  # HLD     Plan:    - on IV heparin, no bolus, with INR<2. Resumed warfarin yesterday. Await today's INR, if less than 2.0 then 5mg warfarin tonight. Hgb stable since IV heparin started.   Awaiting INR today  - given that bleed occurred with therapeutic INR (taking warfarin) and full dose lovenox, will keep in hospital till INR 2.0 or higher so as to avoid lovenox  - Continue metoprolol 25mg BID  - Continue atorvastatin 20mg daily   - Monitor telemetry     Cardiology will follow.

## 2024-05-18 NOTE — PLAN OF CARE
Problem: CARDIOVASCULAR - ADULT  Goal: Maintains optimal cardiac output and hemodynamic stability  Description: INTERVENTIONS:  - Monitor vital signs, rhythm, and trends  - Monitor for bleeding, hypotension and signs of decreased cardiac output  - Evaluate effectiveness of vasoactive medications to optimize hemodynamic stability  - Monitor arterial and/or venous puncture sites for bleeding and/or hematoma  - Assess quality of pulses, skin color and temperature  - Assess for signs of decreased coronary artery perfusion - ex. Angina  - Evaluate fluid balance, assess for edema, trend weights  Outcome: Progressing  Goal: Absence of cardiac arrhythmias or at baseline  Description: INTERVENTIONS:  - Continuous cardiac monitoring, monitor vital signs, obtain 12 lead EKG if indicated  - Evaluate effectiveness of antiarrhythmic and heart rate control medications as ordered  - Initiate emergency measures for life threatening arrhythmias  - Monitor electrolytes and administer replacement therapy as ordered  Outcome: Progressing     Problem: GENITOURINARY - ADULT  Goal: Absence of urinary retention  Description: INTERVENTIONS:  - Assess patient’s ability to void and empty bladder  - Monitor intake/output and perform bladder scan as needed  - Follow urinary retention protocol/standard of care  - Consider collaborating with pharmacy to review patient's medication profile  - Implement strategies to promote bladder emptying  Outcome: Progressing     Problem: SKIN/TISSUE INTEGRITY - ADULT  Goal: Skin integrity remains intact  Description: INTERVENTIONS  - Assess and document risk factors for pressure ulcer development  - Assess and document skin integrity  - Monitor for areas of redness and/or skin breakdown  - Initiate interventions, skin care algorithm/standards of care as needed  Outcome: Progressing     Problem: HEMATOLOGIC - ADULT  Goal: Maintains hematologic stability  Description: INTERVENTIONS  - Assess for signs and  symptoms of bleeding or hemorrhage  - Monitor labs and vital signs for trends  - Administer supportive blood products/factors, fluids and medications as ordered and appropriate  - Administer supportive blood products/factors as ordered and appropriate  Outcome: Progressing  Goal: Free from bleeding injury  Description: (Example usage: patient with low platelets)  INTERVENTIONS:  - Avoid intramuscular injections, enemas and rectal medication administration  - Ensure safe mobilization of patient  - Hold pressure on venipuncture sites to achieve adequate hemostasis  - Assess for signs and symptoms of internal bleeding  - Monitor lab trends  - Patient is to report abnormal signs of bleeding to staff  - Avoid use of toothpicks and dental floss  - Use electric shaver for shaving  - Use soft bristle tooth brush  - Limit straining and forceful nose blowing  Outcome: Progressing

## 2024-05-19 LAB
ALBUMIN SERPL-MCNC: 3.2 G/DL (ref 3.2–4.8)
ALBUMIN/GLOB SERPL: 1.2 {RATIO} (ref 1–2)
ALP LIVER SERPL-CCNC: 85 U/L
ALT SERPL-CCNC: 31 U/L
ANION GAP SERPL CALC-SCNC: 7 MMOL/L (ref 0–18)
APTT PPP: 106.9 SECONDS (ref 23–36)
APTT PPP: 71.1 SECONDS (ref 23–36)
APTT PPP: 95.8 SECONDS (ref 23–36)
AST SERPL-CCNC: 32 U/L (ref ?–34)
BASOPHILS # BLD AUTO: 0.06 X10(3) UL (ref 0–0.2)
BASOPHILS NFR BLD AUTO: 0.7 %
BILIRUB SERPL-MCNC: 1.6 MG/DL (ref 0.2–1.1)
BUN BLD-MCNC: 15 MG/DL (ref 9–23)
BUN/CREAT SERPL: 22.4 (ref 10–20)
CALCIUM BLD-MCNC: 8.3 MG/DL (ref 8.7–10.4)
CHLORIDE SERPL-SCNC: 104 MMOL/L (ref 98–112)
CO2 SERPL-SCNC: 26 MMOL/L (ref 21–32)
CREAT BLD-MCNC: 0.67 MG/DL
DEPRECATED RDW RBC AUTO: 50.4 FL (ref 35.1–46.3)
EGFRCR SERPLBLD CKD-EPI 2021: 97 ML/MIN/1.73M2 (ref 60–?)
EOSINOPHIL # BLD AUTO: 0.3 X10(3) UL (ref 0–0.7)
EOSINOPHIL NFR BLD AUTO: 3.3 %
ERYTHROCYTE [DISTWIDTH] IN BLOOD BY AUTOMATED COUNT: 15.8 % (ref 11–15)
GLOBULIN PLAS-MCNC: 2.6 G/DL (ref 2–3.5)
GLUCOSE BLD-MCNC: 94 MG/DL (ref 70–99)
HCT VFR BLD AUTO: 27 %
HGB BLD-MCNC: 8.5 G/DL
IMM GRANULOCYTES # BLD AUTO: 0.15 X10(3) UL (ref 0–1)
IMM GRANULOCYTES NFR BLD: 1.6 %
INR BLD: 2.05 (ref 0.8–1.2)
LYMPHOCYTES # BLD AUTO: 1.57 X10(3) UL (ref 1–4)
LYMPHOCYTES NFR BLD AUTO: 17.1 %
MCH RBC QN AUTO: 31 PG (ref 26–34)
MCHC RBC AUTO-ENTMCNC: 31.5 G/DL (ref 31–37)
MCV RBC AUTO: 98.5 FL
MONOCYTES # BLD AUTO: 1.12 X10(3) UL (ref 0.1–1)
MONOCYTES NFR BLD AUTO: 12.2 %
NEUTROPHILS # BLD AUTO: 5.99 X10 (3) UL (ref 1.5–7.7)
NEUTROPHILS # BLD AUTO: 5.99 X10(3) UL (ref 1.5–7.7)
NEUTROPHILS NFR BLD AUTO: 65.1 %
OSMOLALITY SERPL CALC.SUM OF ELEC: 285 MOSM/KG (ref 275–295)
PLATELET # BLD AUTO: 412 10(3)UL (ref 150–450)
PLATELET # BLD AUTO: 412 10(3)UL (ref 150–450)
POTASSIUM SERPL-SCNC: 3.9 MMOL/L (ref 3.5–5.1)
PROT SERPL-MCNC: 5.8 G/DL (ref 5.7–8.2)
PROTHROMBIN TIME: 24.4 SECONDS (ref 11.6–14.8)
RBC # BLD AUTO: 2.74 X10(6)UL
SODIUM SERPL-SCNC: 137 MMOL/L (ref 136–145)
WBC # BLD AUTO: 9.2 X10(3) UL (ref 4–11)

## 2024-05-19 PROCEDURE — 99233 SBSQ HOSP IP/OBS HIGH 50: CPT | Performed by: INTERNAL MEDICINE

## 2024-05-19 NOTE — PROGRESS NOTES
Cardiology  Progress Note  [facility]    Mohan Castañeda Patient Status:  Inpatient    1950 MRN [id number]   Location [facility] 5SW/SE Attending Tj Alvarado MD   Hosp Day # 5 PCP Alden Faulkner MD     SUBJECTIVE:    No chest pain or dyspnea. Getting stronger.INR today 2.04        Reason for Consultation:  Regarding anticoagulation.    History of Present Illness:  Mohan Castañeda is a a(n) 73 year old male PMH of CAD s/p CABG, chronic Afib on coumadin, MR s/p mitral valve repair, HFrEF, hx of previous DVT and hx of TIA who presents to ED on 24 with abodminal pain. Symptoms have been ongoing for ~5 days. Patient has been giving himself Lovenox injections to supplement his Coumadin in anticipation for right leg surgery that was canceled last week. Started noticing abdominal pain, swelling and bruising. Denies any chest pain, SOB or palpitations.   In ED, pt was hemodynamically stable. Hgb noted to down to 9 (from baseline of 15). CT abdomen showed large rectus sheath hematoma. Admitted for further evaluation.     Currently, pt reports doing ok. Denies any chest pain, SOB or palpitations. Remains hemodynamically stable.     History:  Past Medical History:    Abdominal wall hematoma    Status post embolization right inferior epigastric artery pseudoaneurysm    Aortic atherosclerosis (HCC)    CT scan -    ASHD (arteriosclerotic heart disease)    Cath 11-15 with EF 55%, 85% left main, s/p CABG ×1 with LIMA graft to LAD 11-15    Atrial fibrillation (HCC)    Back problem    BPH (benign prostatic hyperplasia)    Deep vein thrombosis (HCC)    Blood Clot passed through Left Eye    Dyslipidemia    Hemorrhoids    Long term (current) use of anticoagulants    Mitral regurgitation    Severe, s/p mitral valve repair -15; Echo 10- with EF 45-50%, moderate SHERWIN, mild AI    Osteoarthritis    lower back    Prostate cancer (HCC)    Asim's grade 3+3; PSA 8.55    SVT (supraventricular  tachycardia) (HCC)    TIA (transient ischemic attack)    Transient vision loss left eye; INR subtherapeutic; MRA carotids with possible narrowing right vertebral artery origin; Echo with EF 45-50%, moderate SHERWIN, mild AI     Past Surgical History:   Procedure Laterality Date    Cabg  2015    CABG ×1 with LIMA graft to LAD and mitral valve repair with ligation left atrial appendage    Hemorrhoidectomy      Other      Excision benign calcified nodules right shoulder    Other  2000    Nasal fracture    Other  2019    Embolization right inferior epigastric artery pseudoaneurysm    Other surgical history Right     Right Upper chest/axilla region lysis of adhesions     Family History   Problem Relation Age of Onset    Other (CVA [Other]) Mother 56         age 56    Breast Cancer Mother     Dementia Father     Prostate Cancer Father     Diabetes Father     Hypertension Brother       reports that he quit smoking about 42 years ago. His smoking use included cigarettes. He started smoking about 57 years ago. He has a 22.5 pack-year smoking history. He has never used smokeless tobacco. He reports that he does not currently use drugs. He reports that he does not drink alcohol.    Allergies:  No Known Allergies    Medications:  No current facility-administered medications on file prior to encounter.     Current Outpatient Medications on File Prior to Encounter   Medication Sig Dispense Refill    warfarin 5 MG Oral Tab Take 1 tablet (5 mg total) by mouth nightly. 90 tablet 1    Ergocalciferol (VITAMIN D OR) Take 1,000 Units by mouth daily.        aspirin 81 MG Oral Tab EC Take 1 tablet (81 mg total) by mouth at bedtime.      Saw Palmetto, Serenoa repens, (SAW PALMETTO OR) Take 1 capsule by mouth daily.      NETTLE-PYGEUM AFRICANUM OR Take 1 capsule by mouth daily.      Cyanocobalamin (VITAMIN B-12 OR) Take by mouth daily.      metoprolol tartrate 25 MG Oral Tab Take 1 tablet (25 mg total) by mouth 2 (two)  times daily. 180 tablet 1    ATORVASTATIN 20 MG Oral Tab Take 1 tablet by mouth once daily 90 tablet 0    Warfarin Sodium 2 MG Oral Tab Take 1 tablet (2 mg total) by mouth nightly. No coumadin tonight . Start taking on . INR on Friday 1/3, call MD for instructions on dose and the timing of the next INR (Patient taking differently: Take 2.5 mg by mouth nightly. No coumadin tonight . Start taking on . INR on Friday 1/3, call MD for instructions on dose and the timing of the next INR) 30 tablet 0           Physical Exam:  Blood pressure 144/80, pulse 80, temperature 98 °F (36.7 °C), temperature source Oral, resp. rate 18, height 5' 10\" (1.778 m), weight 195 lb 12.8 oz (88.8 kg), SpO2 96%.  Wt Readings from Last 3 Encounters:   24 195 lb 12.8 oz (88.8 kg)   24 195 lb (88.5 kg)   10/13/23 195 lb 12.8 oz (88.8 kg)       General: awake, alert, oriented x 3, no acute distress  HEENT: at/nc, perrl, eomi  Neck: supple  Cardiac: irregular rhythm, S1, S2 normal, no murmur, rub or gallop.  Lungs: Clear without wheezes, rales, rhonchi or dullness.  Normal excursions and effort.  Abdomen: Soft, distended, ecchymosis lower abdomen  Extremities: Without clubbing, cyanosis or edema.  Peripheral pulses are 2+.  Neurologic: Alert and oriented, normal affect.  Psych: normal mood and affect  Skin: Warm and dry.       Laboratories and Data:  Diagnostics:  EK24  Afib  with PVC, RBBB    Echo: 10/3/23  Conclusions:     1. Left ventricle: The cavity size was normal. Wall thickness was normal. Systolic function was mildly reduced. The estimated ejection fraction was 45-50%, by biplane method of disks. Technical limitations of the study preclude regional wall motion analysis. Unable to assess LV diastolic function due to heart rhythm.   2. Right ventricle: The cavity size was mildly increased. Systolic function was mildly reduced.   3. Left atrium: The atrium was moderately dilated.   4.  Right atrium: The atrium was moderately dilated.   5. Aortic valve: There was mild regurgitation.     Stress Test: 10/16/20  Summary:   1. Stress echo: There is no evidence for stress-induced ischemia.   2. Stress ECG conclusions: The stress ECG is negative for ischemia.       Labs:   CBC:    Lab Results   Component Value Date    WBC 9.2 05/19/2024    WBC 9.3 05/18/2024    WBC 10.6 05/17/2024     Lab Results   Component Value Date    HGB 8.5 (L) 05/19/2024    HGB 8.2 (L) 05/18/2024    HGB 8.5 (L) 05/17/2024      Lab Results   Component Value Date    .0 05/19/2024    .0 05/19/2024    .0 05/18/2024    .0 05/18/2024     BMP:   No results found for: \"GLUCOSE\"  Recent Labs   Lab 05/17/24  0658 05/18/24  1001 05/19/24  0625   * 100* 94   BUN 16 15 15   CREATSERUM 0.79 0.72 0.67*   EGFRCR 93 95 97   CA 8.4* 8.2* 8.3*   * 135* 137   K 3.6 3.8 3.9    103 104   CO2 27.0 27.0 26.0     Lab Results   Component Value Date    INR 2.05 (H) 05/19/2024    INR 1.70 (H) 05/18/2024    INR 1.66 (H) 05/18/2024        Cholesterol:     Lab Results   Component Value Date    CHOLEST 148 10/02/2023    CHOLEST 149 04/04/2022    CHOLEST 142.00 09/30/2021     Lab Results   Component Value Date    HDL 56 10/02/2023    HDL 60 (H) 04/04/2022    HDL 54 09/30/2021     Lab Results   Component Value Date    TRIG 71 10/02/2023    TRIG 58 04/04/2022    TRIG 60.00 09/30/2021     Lab Results   Component Value Date    LDL 78 10/02/2023    LDL 77 04/04/2022    LDL 76 09/30/2021     Lab Results   Component Value Date    AST 32 05/19/2024    AST 28 05/18/2024    AST 34 05/17/2024     Lab Results   Component Value Date    ALT 31 05/19/2024    ALT 35 05/18/2024    ALT 39 05/17/2024       Assessment/Plan:    # Large rectus sheath hematoma: 15 x 11 x 17 cm. No active bleeding. Prior episode Dec 2019.   # Permanent afib - CHADs-VASC score 7  # HFrEF - last EF 40-45%  # CAD s/p CABG x 1v (LIMA to LAD)  # Hx of MR s/p  Mitral valve repair   # Hx of TIA  # HLD     Plan:   Ok to stop iv heparin and dc on warfarin (INR > 2.0) from cardiology perspective,  - Continue metoprolol 25mg BID  - Continue atorvastatin 20mg daily   - Monitor telemetry     Cardiology will follow.

## 2024-05-19 NOTE — PROGRESS NOTES
[facility]  part of [facility]     Hospitalist Progress Note     Mohan Castañeda Patient Status:  Inpatient    1950 MRN [id number]   Location [facility] 5SW/SE Attending Andra Hoang MD   Hosp Day # 5 PCP Alden Faulkner MD     Subjective:     Sitting up comfortably in bed and in NAD. Denied any active complaints at the time of interview. Watching TV.  No events reported by the nursing staff.  In good spirits this morning and curious about discharge plans.  Looking forward to discharging soon.     Objective:    Review of Systems:   ROS completed; pertinent positive and negatives stated in subjective.      Vital signs:  Temp:  [98 °F (36.7 °C)-98.6 °F (37 °C)] 98 °F (36.7 °C)  Pulse:  [84-97] 84  Resp:  [16-18] 18  BP: (116-149)/(72-80) 149/80  SpO2:  [94 %-98 %] 96 %      Physical Exam:    Gen: NAD AO x3  Chest: good air entry CTABL  CVS: normal s1 and s2 RR  Abd: NABS soft NT ND, ecchymosis present  Neuro: CN 2-12 grossly intact  Ext: no edema in bilateral LE      Diagnostic Data:    Labs:  Recent Labs   Lab 24  0242 05/15/24  0729 05/15/24  0730 24  0624  0658 24  1001 24  0625   WBC 14.4*  --  10.3 10.4 10.6 9.3 9.2   HGB 8.6*  --  8.6* 8.4* 8.5* 8.2* 8.5*   MCV 93.5  --  94.4 95.3 97.8 96.2 98.5   .0  --  281.0 337.0 396.0 404.0  404.0 412.0  412.0   BAND 1  --   --   --   --   --   --    INR 2.69* 2.22*  --  1.62* 1.41* 1.70*  1.66*  --        Recent Labs   Lab 24  0626 24  0658 24  1001   *  114* 101* 100*   BUN 21  21 16 15   CREATSERUM 0.82  0.82 0.79 0.72   CA 8.6*  8.6* 8.4* 8.2*   ALB 3.5 3.4 3.3   *  130* 133* 135*   K 3.5  3.5 3.6 3.8   CL 98  98 101 103   CO2 25.0  25.0 27.0 27.0   ALKPHO 94 93 89   AST 36* 34 28   ALT 38 39 35   BILT 2.3* 2.1* 1.7*   TP 6.3 6.1 6.0       Estimated Creatinine Clearance: 91.5 mL/min (based on SCr of 0.72 mg/dL).    Recent Labs   Lab 24  0602  05/17/24  0658 05/18/24  1001   PTP 20.3* 18.2* 21.0*  20.6*   INR 1.62* 1.41* 1.70*  1.66*              Imaging: Imaging data reviewed in Epic.    Medications:    warfarin  5 mg Oral Nightly    erythromycin  1 Application Both Eyes Daily    atorvastatin  20 mg Oral Daily    metoprolol tartrate  25 mg Oral BID       Assessment & Plan:     Large rectus sheath hematoma  Permanent afib - CHADsVASc 7  No active bleeding  CT reviewed  Monitor H&H, transfuse as indicated  Cardiology on consult  INR 1.62 -> 1.41 -> 1.7. Continue heparin IV  Warfarin started 5/17/2024, daily INR checks  Maintain hospitalization until INR >2.0 so as to avoid lovenox dosing  Warfarin daily dosing until INR is at goal  Continue Metoprolol and Statin  Gsx on consult  Okay to resume AC  Maintain INR 2-2.5   Outpatient follow up in 6 weeks  Discharge planning per cardiology - appreciate recs  Coumadin dosing per cardiology  HFrEF - EF 40-45%  CAD s/p CABG  Hx of MR sp MV repair  Hx of TIA  HLD      Plan of care discussed with patient or family at bedside.      Supplementary Documentation:     Quality:  DVT Prophylaxis: Warfarin  CODE status: Full      Estimated date of discharge: TBD  Discharge is dependent on: clinical stability  At this point Mr. Castañeda is expected to be discharge to: home            **Certification      PHYSICIAN Certification of Need for Inpatient Hospitalization - Initial Certification    Patient will require inpatient services that will reasonably be expected to span two midnight's based on the clinical documentation in H+P.   Based on patients current state of illness, I anticipate that, after discharge, patient will require TBD.      Andra Hoang MD  Hospitalist    MDM: High, I personally reviewed the available laboratories, imaging including CT. I discussed the case with RN. I ordered laboratories, studies including AM labs.  Medical decision making high, risk is high.       The 21st Century Cures Act makes medical  notes like these available to patients in the interest of transparency. Please be advised this is a medical document. Medical documents are intended to carry relevant information, facts as evident, and the clinical opinion of the practitioner. The medical note is intended as peer to peer communication and may appear blunt or direct. It is written in medical language and may contain abbreviations or verbiage that are unfamiliar.

## 2024-05-19 NOTE — PLAN OF CARE
Problem: PAIN - ADULT  Goal: Verbalizes/displays adequate comfort level or patient's stated pain goal  Description: INTERVENTIONS:  - Encourage pt to monitor pain and request assistance  - Assess pain using appropriate pain scale  - Administer analgesics based on type and severity of pain and evaluate response  - Implement non-pharmacological measures as appropriate and evaluate response  - Consider cultural and social influences on pain and pain management  - Manage/alleviate anxiety  - Utilize distraction and/or relaxation techniques  - Monitor for opioid side effects  - Notify MD/LIP if interventions unsuccessful or patient reports new pain  - Anticipate increased pain with activity and pre-medicate as appropriate  Outcome: Progressing     Problem: CARDIOVASCULAR - ADULT  Goal: Maintains optimal cardiac output and hemodynamic stability  Description: INTERVENTIONS:  - Monitor vital signs, rhythm, and trends  - Monitor for bleeding, hypotension and signs of decreased cardiac output  - Evaluate effectiveness of vasoactive medications to optimize hemodynamic stability  - Monitor arterial and/or venous puncture sites for bleeding and/or hematoma  - Assess quality of pulses, skin color and temperature  - Assess for signs of decreased coronary artery perfusion - ex. Angina  - Evaluate fluid balance, assess for edema, trend weights  Outcome: Progressing     Problem: CARDIOVASCULAR - ADULT  Goal: Absence of cardiac arrhythmias or at baseline  Description: INTERVENTIONS:  - Continuous cardiac monitoring, monitor vital signs, obtain 12 lead EKG if indicated  - Evaluate effectiveness of antiarrhythmic and heart rate control medications as ordered  - Initiate emergency measures for life threatening arrhythmias  - Monitor electrolytes and administer replacement therapy as ordered  Outcome: Progressing     Problem: GENITOURINARY - ADULT  Goal: Absence of urinary retention  Description: INTERVENTIONS:  - Assess patient’s  ability to void and empty bladder  - Monitor intake/output and perform bladder scan as needed  - Follow urinary retention protocol/standard of care  - Consider collaborating with pharmacy to review patient's medication profile  - Implement strategies to promote bladder emptying  Outcome: Progressing     Problem: SKIN/TISSUE INTEGRITY - ADULT  Goal: Skin integrity remains intact  Description: INTERVENTIONS  - Assess and document risk factors for pressure ulcer development  - Assess and document skin integrity  - Monitor for areas of redness and/or skin breakdown  - Initiate interventions, skin care algorithm/standards of care as needed  Outcome: Progressing     Problem: HEMATOLOGIC - ADULT  Goal: Maintains hematologic stability  Description: INTERVENTIONS  - Assess for signs and symptoms of bleeding or hemorrhage  - Monitor labs and vital signs for trends  - Administer supportive blood products/factors, fluids and medications as ordered and appropriate  - Administer supportive blood products/factors as ordered and appropriate  Outcome: Progressing     Problem: HEMATOLOGIC - ADULT  Goal: Free from bleeding injury  Description: (Example usage: patient with low platelets)  INTERVENTIONS:  - Avoid intramuscular injections, enemas and rectal medication administration  - Ensure safe mobilization of patient  - Hold pressure on venipuncture sites to achieve adequate hemostasis  - Assess for signs and symptoms of internal bleeding  - Monitor lab trends  - Patient is to report abnormal signs of bleeding to staff  - Avoid use of toothpicks and dental floss  - Use electric shaver for shaving  - Use soft bristle tooth brush  - Limit straining and forceful nose blowing  Outcome: Progressing

## 2024-05-20 VITALS
TEMPERATURE: 98 F | SYSTOLIC BLOOD PRESSURE: 142 MMHG | HEART RATE: 85 BPM | DIASTOLIC BLOOD PRESSURE: 67 MMHG | HEIGHT: 70 IN | OXYGEN SATURATION: 97 % | RESPIRATION RATE: 18 BRPM | BODY MASS INDEX: 28.03 KG/M2 | WEIGHT: 195.81 LBS

## 2024-05-20 LAB
ALBUMIN SERPL-MCNC: 3.2 G/DL (ref 3.2–4.8)
ALBUMIN/GLOB SERPL: 1.2 {RATIO} (ref 1–2)
ALP LIVER SERPL-CCNC: 92 U/L
ALT SERPL-CCNC: 39 U/L
ANION GAP SERPL CALC-SCNC: 7 MMOL/L (ref 0–18)
AST SERPL-CCNC: 33 U/L (ref ?–34)
BASOPHILS # BLD AUTO: 0.04 X10(3) UL (ref 0–0.2)
BASOPHILS NFR BLD AUTO: 0.5 %
BILIRUB SERPL-MCNC: 1.5 MG/DL (ref 0.2–1.1)
BUN BLD-MCNC: 17 MG/DL (ref 9–23)
BUN/CREAT SERPL: 21.8 (ref 10–20)
CALCIUM BLD-MCNC: 8.6 MG/DL (ref 8.7–10.4)
CHLORIDE SERPL-SCNC: 106 MMOL/L (ref 98–112)
CO2 SERPL-SCNC: 25 MMOL/L (ref 21–32)
CREAT BLD-MCNC: 0.78 MG/DL
DEPRECATED RDW RBC AUTO: 52.1 FL (ref 35.1–46.3)
EGFRCR SERPLBLD CKD-EPI 2021: 93 ML/MIN/1.73M2 (ref 60–?)
EOSINOPHIL # BLD AUTO: 0.26 X10(3) UL (ref 0–0.7)
EOSINOPHIL NFR BLD AUTO: 3 %
ERYTHROCYTE [DISTWIDTH] IN BLOOD BY AUTOMATED COUNT: 16 % (ref 11–15)
GLOBULIN PLAS-MCNC: 2.7 G/DL (ref 2–3.5)
GLUCOSE BLD-MCNC: 105 MG/DL (ref 70–99)
HCT VFR BLD AUTO: 27.3 %
HGB BLD-MCNC: 8.8 G/DL
IMM GRANULOCYTES # BLD AUTO: 0.09 X10(3) UL (ref 0–1)
IMM GRANULOCYTES NFR BLD: 1 %
INR BLD: 2.44 (ref 0.8–1.2)
LYMPHOCYTES # BLD AUTO: 1.32 X10(3) UL (ref 1–4)
LYMPHOCYTES NFR BLD AUTO: 15.3 %
MCH RBC QN AUTO: 31.9 PG (ref 26–34)
MCHC RBC AUTO-ENTMCNC: 32.2 G/DL (ref 31–37)
MCV RBC AUTO: 98.9 FL
MONOCYTES # BLD AUTO: 0.92 X10(3) UL (ref 0.1–1)
MONOCYTES NFR BLD AUTO: 10.7 %
NEUTROPHILS # BLD AUTO: 5.99 X10 (3) UL (ref 1.5–7.7)
NEUTROPHILS # BLD AUTO: 5.99 X10(3) UL (ref 1.5–7.7)
NEUTROPHILS NFR BLD AUTO: 69.5 %
OSMOLALITY SERPL CALC.SUM OF ELEC: 288 MOSM/KG (ref 275–295)
PLATELET # BLD AUTO: 406 10(3)UL (ref 150–450)
POTASSIUM SERPL-SCNC: 3.9 MMOL/L (ref 3.5–5.1)
PROT SERPL-MCNC: 5.9 G/DL (ref 5.7–8.2)
PROTHROMBIN TIME: 28 SECONDS (ref 11.6–14.8)
RBC # BLD AUTO: 2.76 X10(6)UL
SODIUM SERPL-SCNC: 138 MMOL/L (ref 136–145)
WBC # BLD AUTO: 8.6 X10(3) UL (ref 4–11)

## 2024-05-20 PROCEDURE — 99239 HOSP IP/OBS DSCHRG MGMT >30: CPT | Performed by: INTERNAL MEDICINE

## 2024-05-20 RX ORDER — WARFARIN SODIUM 5 MG/1
5 TABLET ORAL AS DIRECTED
Status: SHIPPED | COMMUNITY
Start: 2024-05-20

## 2024-05-20 RX ORDER — WARFARIN SODIUM 2.5 MG/1
TABLET ORAL
Qty: 60 TABLET | Refills: 0 | Status: SHIPPED | OUTPATIENT
Start: 2024-05-20 | End: 2024-05-20

## 2024-05-20 RX ORDER — WARFARIN SODIUM 2.5 MG/1
TABLET ORAL
Qty: 60 TABLET | Refills: 0 | Status: SHIPPED | OUTPATIENT
Start: 2024-05-20

## 2024-05-20 RX ORDER — WARFARIN SODIUM 5 MG/1
5 TABLET ORAL AS DIRECTED
Status: SHIPPED | COMMUNITY
Start: 2024-05-20 | End: 2024-05-20

## 2024-05-20 NOTE — PROGRESS NOTES
Cardiology  Progress Note  [facility]    Mohan Castañeda Patient Status:  Inpatient    1950 MRN [id number]   Location [facility] 5SW/SE Attending Tj Alvarado MD   Hosp Day # 6 PCP Alden Faulkner MD     SUBJECTIVE:    No chest pain or dyspnea.         Reason for Consultation:  Regarding anticoagulation.    History of Present Illness:  Mohan Castañeda is a a(n) 73 year old male PMH of CAD s/p CABG, chronic Afib on coumadin, MR s/p mitral valve repair, HFrEF, hx of previous DVT and hx of TIA who presents to ED on 24 with abodminal pain. Symptoms have been ongoing for ~5 days. Patient has been giving himself Lovenox injections to supplement his Coumadin in anticipation for right leg surgery that was canceled last week. Started noticing abdominal pain, swelling and bruising. Denies any chest pain, SOB or palpitations.   In ED, pt was hemodynamically stable. Hgb noted to down to 9 (from baseline of 15). CT abdomen showed large rectus sheath hematoma. Admitted for further evaluation.     Currently, pt reports doing ok. Denies any chest pain, SOB or palpitations. Remains hemodynamically stable.     History:  Past Medical History:    Abdominal wall hematoma    Status post embolization right inferior epigastric artery pseudoaneurysm    Aortic atherosclerosis (HCC)    CT scan     ASHD (arteriosclerotic heart disease)    Cath 11-15 with EF 55%, 85% left main, s/p CABG ×1 with LIMA graft to LAD 11-15    Atrial fibrillation (HCC)    Back problem    BPH (benign prostatic hyperplasia)    Deep vein thrombosis (HCC)    Blood Clot passed through Left Eye    Dyslipidemia    Hemorrhoids    Long term (current) use of anticoagulants    Mitral regurgitation    Severe, s/p mitral valve repair 11-15; Echo 10-23 with EF 45-50%, moderate SHERWIN, mild AI    Osteoarthritis    lower back    Prostate cancer (HCC)    Orange's grade 3+3; PSA 8.55    SVT (supraventricular tachycardia) (HCC)    TIA  (transient ischemic attack)    Transient vision loss left eye; INR subtherapeutic; MRA carotids with possible narrowing right vertebral artery origin; Echo with EF 45-50%, moderate SHERWIN, mild AI     Past Surgical History:   Procedure Laterality Date    Cabg  2015    CABG ×1 with LIMA graft to LAD and mitral valve repair with ligation left atrial appendage    Hemorrhoidectomy      Other      Excision benign calcified nodules right shoulder    Other      Nasal fracture    Other  2019    Embolization right inferior epigastric artery pseudoaneurysm    Other surgical history Right     Right Upper chest/axilla region lysis of adhesions     Family History   Problem Relation Age of Onset    Other (CVA [Other]) Mother 56         age 56    Breast Cancer Mother     Dementia Father     Prostate Cancer Father     Diabetes Father     Hypertension Brother       reports that he quit smoking about 42 years ago. His smoking use included cigarettes. He started smoking about 57 years ago. He has a 22.5 pack-year smoking history. He has never used smokeless tobacco. He reports that he does not currently use drugs. He reports that he does not drink alcohol.    Allergies:  No Known Allergies    Medications:  No current facility-administered medications on file prior to encounter.     Current Outpatient Medications on File Prior to Encounter   Medication Sig Dispense Refill    warfarin 5 MG Oral Tab Take 1 tablet (5 mg total) by mouth nightly. 90 tablet 1    Ergocalciferol (VITAMIN D OR) Take 1,000 Units by mouth daily.        aspirin 81 MG Oral Tab EC Take 1 tablet (81 mg total) by mouth at bedtime.      Saw Palmetto, Serenoa repens, (SAW PALMETTO OR) Take 1 capsule by mouth daily.      NETTLE-PYGEUM AFRICANUM OR Take 1 capsule by mouth daily.      Cyanocobalamin (VITAMIN B-12 OR) Take by mouth daily.      metoprolol tartrate 25 MG Oral Tab Take 1 tablet (25 mg total) by mouth 2 (two) times daily. 180 tablet 1     ATORVASTATIN 20 MG Oral Tab Take 1 tablet by mouth once daily 90 tablet 0    Warfarin Sodium 2 MG Oral Tab Take 1 tablet (2 mg total) by mouth nightly. No coumadin tonight . Start taking on . INR on Friday 1/3, call MD for instructions on dose and the timing of the next INR (Patient taking differently: Take 2.5 mg by mouth nightly. No coumadin tonight . Start taking on . INR on Friday 1/3, call MD for instructions on dose and the timing of the next INR) 30 tablet 0           Physical Exam:  Blood pressure 137/87, pulse 89, temperature 98.1 °F (36.7 °C), temperature source Oral, resp. rate 20, height 5' 10\" (1.778 m), weight 195 lb 12.8 oz (88.8 kg), SpO2 98%.  Wt Readings from Last 3 Encounters:   24 195 lb 12.8 oz (88.8 kg)   24 195 lb (88.5 kg)   10/13/23 195 lb 12.8 oz (88.8 kg)       General: awake, alert, oriented x 3, no acute distress  HEENT: at/nc, perrl, eomi  Neck: supple  Cardiac: irregular rhythm, S1, S2 normal, no murmur, rub or gallop.  Lungs: Clear without wheezes, rales, rhonchi or dullness.  Normal excursions and effort.  Abdomen: Soft, distended, ecchymosis lower abdomen  Extremities: Without clubbing, cyanosis or edema.  Peripheral pulses are 2+.  Neurologic: Alert and oriented, normal affect.  Psych: normal mood and affect  Skin: Warm and dry.       Laboratories and Data:  Diagnostics:  EK24  Afib  with PVC, RBBB    Echo: 10/3/23  Conclusions:     1. Left ventricle: The cavity size was normal. Wall thickness was normal. Systolic function was mildly reduced. The estimated ejection fraction was 45-50%, by biplane method of disks. Technical limitations of the study preclude regional wall motion analysis. Unable to assess LV diastolic function due to heart rhythm.   2. Right ventricle: The cavity size was mildly increased. Systolic function was mildly reduced.   3. Left atrium: The atrium was moderately dilated.   4. Right atrium: The atrium was  moderately dilated.   5. Aortic valve: There was mild regurgitation.     Stress Test: 10/16/20  Summary:   1. Stress echo: There is no evidence for stress-induced ischemia.   2. Stress ECG conclusions: The stress ECG is negative for ischemia.       Labs:   CBC:    Lab Results   Component Value Date    WBC 8.6 05/20/2024    WBC 9.2 05/19/2024    WBC 9.3 05/18/2024     Lab Results   Component Value Date    HGB 8.8 (L) 05/20/2024    HGB 8.5 (L) 05/19/2024    HGB 8.2 (L) 05/18/2024      Lab Results   Component Value Date    .0 05/20/2024    .0 05/19/2024    .0 05/19/2024     BMP:   No results found for: \"GLUCOSE\"  Recent Labs   Lab 05/18/24  1001 05/19/24  0625 05/20/24  0650   * 94 105*   BUN 15 15 17   CREATSERUM 0.72 0.67* 0.78   EGFRCR 95 97 93   CA 8.2* 8.3* 8.6*   * 137 138   K 3.8 3.9 3.9    104 106   CO2 27.0 26.0 25.0     Lab Results   Component Value Date    INR 2.44 (H) 05/20/2024    INR 2.05 (H) 05/19/2024    INR 1.70 (H) 05/18/2024    INR 1.66 (H) 05/18/2024        Cholesterol:     Lab Results   Component Value Date    CHOLEST 148 10/02/2023    CHOLEST 149 04/04/2022    CHOLEST 142.00 09/30/2021     Lab Results   Component Value Date    HDL 56 10/02/2023    HDL 60 (H) 04/04/2022    HDL 54 09/30/2021     Lab Results   Component Value Date    TRIG 71 10/02/2023    TRIG 58 04/04/2022    TRIG 60.00 09/30/2021     Lab Results   Component Value Date    LDL 78 10/02/2023    LDL 77 04/04/2022    LDL 76 09/30/2021     Lab Results   Component Value Date    AST 33 05/20/2024    AST 32 05/19/2024    AST 28 05/18/2024     Lab Results   Component Value Date    ALT 39 05/20/2024    ALT 31 05/19/2024    ALT 35 05/18/2024       Assessment/Plan:    # Large rectus sheath hematoma: 15 x 11 x 17 cm. No active bleeding. Prior episode Dec 2019.   # Permanent afib - CHADs-VASC score 7  # HFrEF - last EF 40-45%  # CAD s/p CABG x 1v (LIMA to LAD)  # Hx of MR s/p Mitral valve repair   # Hx of  TIA  # HLD     Plan:   - would revert back to his prior warfarin regimen. Would send home with 2.5mg four days a week and 5mg three days a week. Nexct INR 5/23/24  - Continue metoprolol 25mg BID  - Continue atorvastatin 20mg daily   - Monitor telemetry     Cardiology will follow.

## 2024-05-20 NOTE — DISCHARGE SUMMARY
[facility]  part of [facility]    DISCHARGE SUMMARY     Mohan Castañeda Patient Status:  Inpatient    1950 MRN [id number]   Location [facility] 5SW/SE Attending Andra Hoang MD   Hosp Day # 6 PCP Alden Faulkner MD     Date of Admission: 2024  Date of Discharge:  2024    Discharge Disposition: Home or Self Care    Discharge Diagnosis:     Large rectus sheath hematoma  Permanent afib - CHADsVASc 7  HFrEF - EF 40-45%  CAD s/p CABG  Hx of MR sp MV repair  Hx of TIA  HLD    History of Present Illness:     Mohan Castañeda is a(n) 73 year old male, who presents for evaluation of worsening abdominal pain associated with swelling. PMHx significant for CAD s/p CABG w/ LIMA graft to LAD, chronic atrial fibrillation on Coumadin, mitral regurgitation s/p mitral valve repair, HFrEF w/ EF 40-45%, embolic TIA, previous DVT, chronic back pain.  Patient reports that he has been having abdominal pain and swelling for about 5 days.  Patient has been giving himself Lovenox injections to supplement his Coumadin in anticipation for right leg surgery that was canceled last week.  He continues to take his Coumadin and Lovenox.  He then noticed discoloration of his abdomen as well as worsening swelling.  He denies any nausea or vomiting.  Denies any chest pain or shortness of breath.  On presentation to the ED, initial vital signs reveal temp 99.4, heart rate 105, respiratory rate 18, blood pressure 115/73.  Lab work remarkable for sodium level 127, chloride 96, elevated WBC 16.0.  Hemoglobin noted to be 9.  CT abdomen/pelvis reveals a large 15 x 11 x 17 cm right rectus sheath hematoma, no active bleeding identified, no hemoperitoneum or free air.  Patient was admitted under hospitalist service with consultation to general surgery as well as cardiology.     Brief Synopsis:     Large rectus sheath hematoma  Permanent afib - CHADsVASc 7  No active bleeding  CT reviewed  Monitor H&H, transfuse  as indicated  Cardiology on consult  INR 1.62 -> 1.41 -> 1.7 -> 2.4  Warfarin started 5/17/2024, daily INR checks  Maintain hospitalization until INR >2.0 so as to avoid lovenox dosing  Continue Metoprolol and Statin  Discharge on Warfarin 2.5 mg 4 times a day, 5 mg 3 times a day  Gsx on consult  Okay to resume AC  Maintain INR 2-2.5   Outpatient follow up   Discharge planning per cardiology - appreciate recs  Coumadin dosing per cardiology    Patient counseled on medication compliance and recommended to follow up with PCP, Cardio and Gsx as opt.  INR check in 2 days. Counseled on watching for bleeding diathesis and instructed on when to return to the ER.     Patient is to remain compliant with all discharge medications and instructions and to follow up as advised.   Patient encouraged to make healthy lifestyle and dietary changes.    Lace+ Score: 76  59-90 High Risk  29-58 Medium Risk  0-28   Low Risk       TCM Follow-Up Recommendation:  LACE > 58: High Risk of readmission after discharge from the hospital.      Procedures during hospitalization:   None    Incidental or significant findings and recommendations (brief descriptions):  None    Lab/Test results pending at Discharge:   None    Consultants:  Cardiology  Gsx    Discharge Medication List:     Discharge Medications        CHANGE how you take these medications        Instructions Prescription details   warfarin 2.5 MG Tabs  Commonly known as: Coumadin  What changed:   medication strength  how much to take  how to take this  when to take this  additional instructions  Another medication with the same name was removed. Continue taking this medication, and follow the directions you see here.      2.5 mg 4 days a week, 5 mg 3 days a week   Quantity: 60 tablet  Refills: 0            CONTINUE taking these medications        Instructions Prescription details   atorvastatin 20 MG Tabs  Commonly known as: Lipitor      Take 1 tablet by mouth once daily   Quantity: 90  tablet  Refills: 0     metoprolol tartrate 25 MG Tabs  Commonly known as: Lopressor      Take 1 tablet (25 mg total) by mouth 2 (two) times daily.   Quantity: 180 tablet  Refills: 1     NETTLE-PYGEUM AFRICANUM OR      Take 1 capsule by mouth daily.   Refills: 0     SAW PALMETTO OR      Take 1 capsule by mouth daily.   Refills: 0     VITAMIN B-12 OR      Take by mouth daily.   Refills: 0     VITAMIN D OR      Take 1,000 Units by mouth daily.   Refills: 0            STOP taking these medications      aspirin 81 MG Tbec                  Where to Get Your Medications        These medications were sent to [id number] - [place], [place] - [address] [phone], [phone]  [address], [facility] [address]      Phone: [phone]   warfarin 2.5 MG Tabs         [place]    Follow-up appointment:   Alden Joshua MD  [address]  [facility] [id number] [phone]    Call in 6 week(s)  Call for Follow-up after hospitalization    Alden Faulkner MD  [address] [facility] [facility] [id number] [phone]    Follow up in 1 week(s)      Gumaro Vegas DO  [address]   [facility] [address]  [facility] [id number] [phone]    Follow up in 1 week(s)      Appointments for Next 30 Days 5/20/2024 - 6/19/2024      None            Vital signs:  Temp:  [97.4 °F (36.3 °C)-98.1 °F (36.7 °C)] 98.1 °F (36.7 °C)  Pulse:  [83-99] 89  Resp:  [18-20] 20  BP: (131-139)/(72-87) 137/87  SpO2:  [97 %-98 %] 98 %    Physical Exam:    Gen: NAD AO x3  Chest: good air entry CTABL  CVS: normal s1 and s2 RR  Abd: NABS soft NT ND  Neuro: CN 2-12 grossly intact  Ext: no edema in bilateral LE    -----------------------------------------------------------------------------------------------  PATIENT DISCHARGE INSTRUCTIONS: See electronic chart    Andra Hoang MD  Hospitalist    Time spent:  > 30 minutes    The 21st Century Cures Act makes medical notes like these available to patients in the interest of  transparency. Please be advised this is a medical document. Medical documents are intended to carry relevant information, facts as evident, and the clinical opinion of the practitioner. The medical note is intended as peer to peer communication and may appear blunt or direct. It is written in medical language and may contain abbreviations or verbiage that are unfamiliar.

## 2024-05-20 NOTE — PLAN OF CARE
Problem: Patient Centered Care  Goal: Patient preferences are identified and integrated in the patient's plan of care  Description: Interventions:  - What would you like us to know as we care for you? Lives alone  - Provide timely, complete, and accurate information to patient/family  - Incorporate patient and family knowledge, values, beliefs, and cultural backgrounds into the planning and delivery of care  - Encourage patient/family to participate in care and decision-making at the level they choose  - Honor patient and family perspectives and choices  Outcome: Progressing     Problem: Patient/Family Goals  Goal: Patient/Family Long Term Goal  Description: Patient's Long Term Goal: to discharge home    Interventions:  - follow plan of care  - monitor labs  - monitor patients condition  - See additional Care Plan goals for specific interventions  Outcome: Progressing  Goal: Patient/Family Short Term Goal  Description: Patient's Short Term Goal: pain relief    Interventions:   - administer meds as ordered  -monitor patient's condition  - See additional Care Plan goals for specific interventions  Outcome: Progressing     Problem: PAIN - ADULT  Goal: Verbalizes/displays adequate comfort level or patient's stated pain goal  Description: INTERVENTIONS:  - Encourage pt to monitor pain and request assistance  - Assess pain using appropriate pain scale  - Administer analgesics based on type and severity of pain and evaluate response  - Implement non-pharmacological measures as appropriate and evaluate response  - Consider cultural and social influences on pain and pain management  - Manage/alleviate anxiety  - Utilize distraction and/or relaxation techniques  - Monitor for opioid side effects  - Notify MD/LIP if interventions unsuccessful or patient reports new pain  - Anticipate increased pain with activity and pre-medicate as appropriate  Outcome: Progressing     Problem: CARDIOVASCULAR - ADULT  Goal: Maintains optimal  cardiac output and hemodynamic stability  Description: INTERVENTIONS:  - Monitor vital signs, rhythm, and trends  - Monitor for bleeding, hypotension and signs of decreased cardiac output  - Evaluate effectiveness of vasoactive medications to optimize hemodynamic stability  - Monitor arterial and/or venous puncture sites for bleeding and/or hematoma  - Assess quality of pulses, skin color and temperature  - Assess for signs of decreased coronary artery perfusion - ex. Angina  - Evaluate fluid balance, assess for edema, trend weights  Outcome: Progressing  Goal: Absence of cardiac arrhythmias or at baseline  Description: INTERVENTIONS:  - Continuous cardiac monitoring, monitor vital signs, obtain 12 lead EKG if indicated  - Evaluate effectiveness of antiarrhythmic and heart rate control medications as ordered  - Initiate emergency measures for life threatening arrhythmias  - Monitor electrolytes and administer replacement therapy as ordered  Outcome: Progressing     Problem: GENITOURINARY - ADULT  Goal: Absence of urinary retention  Description: INTERVENTIONS:  - Assess patient’s ability to void and empty bladder  - Monitor intake/output and perform bladder scan as needed  - Follow urinary retention protocol/standard of care  - Consider collaborating with pharmacy to review patient's medication profile  - Implement strategies to promote bladder emptying  Outcome: Progressing     Problem: SKIN/TISSUE INTEGRITY - ADULT  Goal: Skin integrity remains intact  Description: INTERVENTIONS  - Assess and document risk factors for pressure ulcer development  - Assess and document skin integrity  - Monitor for areas of redness and/or skin breakdown  - Initiate interventions, skin care algorithm/standards of care as needed  Outcome: Progressing  Goal: Incision(s), wounds(s) or drain site(s) healing without S/S of infection  Description: INTERVENTIONS:  - Assess and document risk factors for pressure ulcer development  - Assess  and document skin integrity  - Assess and document dressing/incision, wound bed, drain sites and surrounding tissue  - Implement wound care per orders  - Initiate isolation precautions as appropriate  - Initiate Pressure Ulcer prevention bundle as indicated  Outcome: Progressing  Goal: Oral mucous membranes remain intact  Description: INTERVENTIONS  - Assess oral mucosa and hygiene practices  - Implement preventative oral hygiene regimen  - Implement oral medicated treatments as ordered  Outcome: Progressing     Problem: HEMATOLOGIC - ADULT  Goal: Maintains hematologic stability  Description: INTERVENTIONS  - Assess for signs and symptoms of bleeding or hemorrhage  - Monitor labs and vital signs for trends  - Administer supportive blood products/factors, fluids and medications as ordered and appropriate  - Administer supportive blood products/factors as ordered and appropriate  Outcome: Progressing  Goal: Free from bleeding injury  Description: (Example usage: patient with low platelets)  INTERVENTIONS:  - Avoid intramuscular injections, enemas and rectal medication administration  - Ensure safe mobilization of patient  - Hold pressure on venipuncture sites to achieve adequate hemostasis  - Assess for signs and symptoms of internal bleeding  - Monitor lab trends  - Patient is to report abnormal signs of bleeding to staff  - Avoid use of toothpicks and dental floss  - Use electric shaver for shaving  - Use soft bristle tooth brush  - Limit straining and forceful nose blowing  Outcome: Progressing   No acute changes

## 2024-05-20 NOTE — CM/SW NOTE
05/20/24 1000   Discharge disposition   Expected discharge disposition Home or Self   Discharge transportation Private car     Pt received MDO for discharge. Patient is self, NN at this time.     SW/CM to remain available for support and/or discharge planning.     Madelin Groves MSW, LSW   x [phone]

## 2024-05-20 NOTE — PLAN OF CARE
Vitals as documented. Patient to discharge home this afternoon. Discharge and follow-up reviewed. IV removed. All questions answered. Discharge AVS given to patient.    Problem: Patient Centered Care  Goal: Patient preferences are identified and integrated in the patient's plan of care  Description: Interventions:  - What would you like us to know as we care for you? Lives alone  - Provide timely, complete, and accurate information to patient/family  - Incorporate patient and family knowledge, values, beliefs, and cultural backgrounds into the planning and delivery of care  - Encourage patient/family to participate in care and decision-making at the level they choose  - Honor patient and family perspectives and choices  Outcome: Adequate for Discharge     Problem: Patient/Family Goals  Goal: Patient/Family Long Term Goal  Description: Patient's Long Term Goal: to discharge home    Interventions:  - follow plan of care  - monitor labs  - monitor patients condition  - See additional Care Plan goals for specific interventions  Outcome: Adequate for Discharge  Goal: Patient/Family Short Term Goal  Description: Patient's Short Term Goal: pain relief    Interventions:   - administer meds as ordered  -monitor patient's condition  - See additional Care Plan goals for specific interventions  Outcome: Adequate for Discharge     Problem: PAIN - ADULT  Goal: Verbalizes/displays adequate comfort level or patient's stated pain goal  Description: INTERVENTIONS:  - Encourage pt to monitor pain and request assistance  - Assess pain using appropriate pain scale  - Administer analgesics based on type and severity of pain and evaluate response  - Implement non-pharmacological measures as appropriate and evaluate response  - Consider cultural and social influences on pain and pain management  - Manage/alleviate anxiety  - Utilize distraction and/or relaxation techniques  - Monitor for opioid side effects  - Notify MD/LIP if  interventions unsuccessful or patient reports new pain  - Anticipate increased pain with activity and pre-medicate as appropriate  Outcome: Adequate for Discharge     Problem: CARDIOVASCULAR - ADULT  Goal: Maintains optimal cardiac output and hemodynamic stability  Description: INTERVENTIONS:  - Monitor vital signs, rhythm, and trends  - Monitor for bleeding, hypotension and signs of decreased cardiac output  - Evaluate effectiveness of vasoactive medications to optimize hemodynamic stability  - Monitor arterial and/or venous puncture sites for bleeding and/or hematoma  - Assess quality of pulses, skin color and temperature  - Assess for signs of decreased coronary artery perfusion - ex. Angina  - Evaluate fluid balance, assess for edema, trend weights  Outcome: Adequate for Discharge  Goal: Absence of cardiac arrhythmias or at baseline  Description: INTERVENTIONS:  - Continuous cardiac monitoring, monitor vital signs, obtain 12 lead EKG if indicated  - Evaluate effectiveness of antiarrhythmic and heart rate control medications as ordered  - Initiate emergency measures for life threatening arrhythmias  - Monitor electrolytes and administer replacement therapy as ordered  Outcome: Adequate for Discharge     Problem: GENITOURINARY - ADULT  Goal: Absence of urinary retention  Description: INTERVENTIONS:  - Assess patient’s ability to void and empty bladder  - Monitor intake/output and perform bladder scan as needed  - Follow urinary retention protocol/standard of care  - Consider collaborating with pharmacy to review patient's medication profile  - Implement strategies to promote bladder emptying  Outcome: Adequate for Discharge     Problem: SKIN/TISSUE INTEGRITY - ADULT  Goal: Skin integrity remains intact  Description: INTERVENTIONS  - Assess and document risk factors for pressure ulcer development  - Assess and document skin integrity  - Monitor for areas of redness and/or skin breakdown  - Initiate interventions,  skin care algorithm/standards of care as needed  Outcome: Adequate for Discharge  Goal: Incision(s), wounds(s) or drain site(s) healing without S/S of infection  Description: INTERVENTIONS:  - Assess and document risk factors for pressure ulcer development  - Assess and document skin integrity  - Assess and document dressing/incision, wound bed, drain sites and surrounding tissue  - Implement wound care per orders  - Initiate isolation precautions as appropriate  - Initiate Pressure Ulcer prevention bundle as indicated  Outcome: Adequate for Discharge  Goal: Oral mucous membranes remain intact  Description: INTERVENTIONS  - Assess oral mucosa and hygiene practices  - Implement preventative oral hygiene regimen  - Implement oral medicated treatments as ordered  Outcome: Adequate for Discharge     Problem: HEMATOLOGIC - ADULT  Goal: Maintains hematologic stability  Description: INTERVENTIONS  - Assess for signs and symptoms of bleeding or hemorrhage  - Monitor labs and vital signs for trends  - Administer supportive blood products/factors, fluids and medications as ordered and appropriate  - Administer supportive blood products/factors as ordered and appropriate  Outcome: Adequate for Discharge  Goal: Free from bleeding injury  Description: (Example usage: patient with low platelets)  INTERVENTIONS:  - Avoid intramuscular injections, enemas and rectal medication administration  - Ensure safe mobilization of patient  - Hold pressure on venipuncture sites to achieve adequate hemostasis  - Assess for signs and symptoms of internal bleeding  - Monitor lab trends  - Patient is to report abnormal signs of bleeding to staff  - Avoid use of toothpicks and dental floss  - Use electric shaver for shaving  - Use soft bristle tooth brush  - Limit straining and forceful nose blowing  Outcome: Adequate for Discharge

## 2024-05-21 ENCOUNTER — PATIENT OUTREACH (OUTPATIENT)
Dept: CASE MANAGEMENT | Age: 74
End: 2024-05-21

## 2024-05-21 DIAGNOSIS — Z02.9 ENCOUNTERS FOR UNSPECIFIED ADMINISTRATIVE PURPOSE: ICD-10-CM

## 2024-05-21 DIAGNOSIS — S30.1XXA RECTUS SHEATH HEMATOMA, INITIAL ENCOUNTER: Primary | ICD-10-CM

## 2024-05-21 PROCEDURE — 1111F DSCHRG MED/CURRENT MED MERGE: CPT

## 2024-05-21 NOTE — PROGRESS NOTES
Attempted to contact pt for TCM however no answer. Call rang twice then disconnected. Unable to leave a VM at this time. NCM to try again at a later time.

## 2024-05-22 ENCOUNTER — OFFICE VISIT (OUTPATIENT)
Dept: INTERNAL MEDICINE CLINIC | Facility: CLINIC | Age: 74
End: 2024-05-22

## 2024-05-22 VITALS
HEIGHT: 70 IN | SYSTOLIC BLOOD PRESSURE: 126 MMHG | WEIGHT: 200.38 LBS | BODY MASS INDEX: 28.69 KG/M2 | DIASTOLIC BLOOD PRESSURE: 58 MMHG | HEART RATE: 71 BPM

## 2024-05-22 DIAGNOSIS — D62 ACUTE BLOOD LOSS ANEMIA: ICD-10-CM

## 2024-05-22 DIAGNOSIS — S30.1XXD HEMATOMA OF RECTUS SHEATH, SUBSEQUENT ENCOUNTER: Primary | ICD-10-CM

## 2024-05-22 DIAGNOSIS — I48.20 CHRONIC ATRIAL FIBRILLATION (HCC): ICD-10-CM

## 2024-05-22 PROCEDURE — 99496 TRANSJ CARE MGMT HIGH F2F 7D: CPT | Performed by: INTERNAL MEDICINE

## 2024-05-22 NOTE — PATIENT INSTRUCTIONS
Please adjust your warfarin dose as recommended by the Anticoagulation Clinic and have your INR checked regularly  Continue current medications  Please check a blood count in about 1 week  See me again in 3 months  It may take several weeks for your abdominal wall hematoma to resolve

## 2024-05-22 NOTE — PROGRESS NOTES
Subjective:   Mohan Castañeda is a 73 year old male who presents for hospital follow up.   He was discharged from [facility] to Home or Self Care  Admission Date: 5/13/24   Discharge Date: 5/20/24  Hospital Discharge Diagnosis: Large right rectus sheath hematoma, acute blood loss anemia, chronic atrial fibrillation    Interactive contact within 2 business days post discharge first initiated on Date: 5/21/2024    During the visit, the following was completed:  Obtained and reviewed discharge summary, continuity of care documents, Hospitalist notes, Cardiology notes, and Surgery Notes  Reviewed Labs (CBC, CMP), CT radiology results, and EKG    HPI: In anticipation of excision of a skin lesion on his right popliteal fossa, he was being treated with bridging Lovenox after warfarin was stopped.  He subsequently developed right-sided abdominal wall pain and swelling and was hospitalized on May 13 with a large rectus sheath hematoma.  BMP normal except glucose 134 sodium 127 BUN 38 creatinine 1.21.  Hepatic function normal except AST 48 ALT 55 bilirubin 2.7.  CBC with hemoglobin 9.3 hematocrit 26.9 MCV 92.1 WBC count 16.0.  PT 30.1 with INR 2.67.  PTT elevated at 53.3.  Lipase normal at 34.  Urinalysis normal except glucose 30, protein 30, blood 1+ with RBCs 0-2.  CT scan abdomen and pelvis revealed a 17.5 cm right rectus sheath hematoma.  EKG revealed atrial fibrillation with rate 97 with PVCs and right bundle branch block, unchanged since October 2023.  While hospitalized he was seen by Cardiology and General Surgery.  Anticoagulation was temporarily held.  No PRBC transfusion was done.  He was discharged home 2 days ago on May 20.  On May 20, CMP normal except glucose 105 with sodium normal at 138 BUN 17 creatinine 0.78 and bilirubin 1.5, CBC with hemoglobin 8.8 hematocrit 27.3 with WBC count normal at 8.6, and PT 28.0 with INR 2.44.    He presents this afternoon for follow-up.  Right-sided abdominal wall swelling  has been stable.  He is eating normally without nausea or vomiting, and without abdominal pain with eating.  Stools occasionally loose but otherwise normal without rectal bleeding.  He has felt some fatigue but no significant shortness of breath.  No lightheadedness or dizziness.  No palpitations or chest pain.    Medications reviewed and reconciled, as listed below.  Earlier today INR 3.5 and warfarin was decreased to 5 mg on Monday and Friday and 2.5 mg 5 days weekly.    History/Other:   Current Medications:  Medication Reconciliation:  I am aware of an inpatient discharge within the last 30 days.  The discharge medication list has been reconciled with the patient's current medication list and reviewed by me.  See medication list for additions of new medication, and changes to current doses of medications and discontinued medications.  Outpatient Medications Marked as Taking for the 5/22/24 encounter (Office Visit) with Alden Faulkner MD   Medication Sig    warfarin 2.5 MG Oral Tab 2.5 mg Tu/Th/Sat/Su, 5 mg M/W/F    warfarin 5 MG Oral Tab Take 1 tablet (5 mg total) by mouth As Directed.    Saw Palmetto, Serenoa repens, (SAW PALMETTO OR) Take 1 capsule by mouth daily.    NETTLE-PYGEUM AFRICANUM OR Take 1 capsule by mouth daily.    Cyanocobalamin (VITAMIN B-12 OR) Take by mouth daily.    metoprolol tartrate 25 MG Oral Tab Take 1 tablet (25 mg total) by mouth 2 (two) times daily.    ATORVASTATIN 20 MG Oral Tab Take 1 tablet by mouth once daily    Ergocalciferol (VITAMIN D OR) Take 1,000 Units by mouth daily.         Objective:   No LMP for male patient.  Estimated body mass index is 28.75 kg/m² as calculated from the following:    Height as of this encounter: 5' 10\" (1.778 m).    Weight as of this encounter: 200 lb 6.4 oz (90.9 kg).   /58   Pulse 71   Ht 5' 10\" (1.778 m)   Wt 200 lb 6.4 oz (90.9 kg)   BMI 28.75 kg/m²      EXAM:   GENERAL: Pleasant male appearing well in no distress  /58   Pulse 71    Ht 5' 10\" (1.778 m)   Wt 200 lb 6.4 oz (90.9 kg)   BMI 28.75 kg/m²   LUNGS: Resonant to percussion and clear to auscultation without crackles or wheezes  CARDIAC: Rhythm irregularly irregular S1 S2 normal without murmur, with 1-2+ pitting edema distal lower extremities bilaterally  ABDOMEN: Multiple scattered and occasionally large purpleish ecchymoses abdominal wall with firm swelling of the right upper abdominal wall.  Bowel sounds normal.  Soft    Assessment & Plan:   1. Hematoma of rectus sheath, subsequent encounter (Primary)  Decrease warfarin dose as recommended above by the Anticoagulation Clinic  Regular INR monitoring  Discussed that it may take several weeks for hematoma to resolve    2. Acute blood loss anemia  Recommend CBC in about 1 week.  Standing order sent  Consider ferrous sulfate if anemia does not improve  -     CBC, Platelet; No Differential; Standing    3. Chronic atrial fibrillation (HCC)  Ongoing follow-up with Cardiology and treatment with warfarin        No follow-ups on file.

## 2024-05-22 NOTE — PROGRESS NOTES
Initial Post Discharge Follow Up   Discharge Date: 5/20/24  Contact Date: 5/22/2024    Consent Verification:  Assessment Completed With: Patient  HIPAA Verified?  Yes    Discharge Dx:   Large rectus sheath hematoma  Permanent afib - CHADsVASc 7  HFrEF - EF 40-45%  CAD s/p CABG  Hx of MR sp MV repair  Hx of TIA  HLD    General:   How have you been since your discharge from the hospital? Pt feeling better, since hospital discharge--some fatigue, but improving. Mild, intermittent abdominal discomfort 2/10--denies need for any pain medication, at this time--aware to avoid NSAIDs d/t Warfarin. Pt denies fever, chills, headache, vision changes, dizziness, nausea, vomiting, diarrhea, bleeding, intractable abdominal pain, chest pain or shortness of breath at this time. Appetite adequate, staying hydrated, independent with ADLs.  Do you have any pain since discharge?  Yes  Where: abdomen   Rating on pain scale 1-10, 10 being the worst pain you have ever experienced, what is current pain: 2  Alleviating factors: rest  Aggravating factors: positional  Is the pain manageable at home? Yes  How well was your pain managed while in the hospital?   On a scale of 1-5   1- Very Poor and 5- Very well   Very Well  When you were leaving the hospital were your discharge instructions reviewed with you? Yes  How well were your discharge instructions explained to you?   On a scale of 1-5   1- Very Poor and 5- Very well   Very Well  Do you have any questions about your discharge instructions?  No  Before leaving the hospital was your diagnoses explained to you? Yes  Do you have any questions about your diagnoses? No  Are you able to perform normal daily activities of living as you have prior to your hospital stay (dressing, bathing, ambulating to the bathroom, etc)? yes  (NCM) Was patient given a different diet per AVS? no    Medications:   Current Outpatient Medications   Medication Sig Dispense Refill    warfarin 2.5 MG Oral Tab 2.5 mg  Tu/Th/Sat/Hernandez, 5 mg M/W/F 60 tablet 0    warfarin 5 MG Oral Tab Take 1 tablet (5 mg total) by mouth As Directed.      Saw Palmetto, Serenoa repens, (SAW PALMETTO OR) Take 1 capsule by mouth daily.      NETTLE-PYGEUM AFRICANUM OR Take 1 capsule by mouth daily.      Cyanocobalamin (VITAMIN B-12 OR) Take by mouth daily.      metoprolol tartrate 25 MG Oral Tab Take 1 tablet (25 mg total) by mouth 2 (two) times daily. 180 tablet 1    ATORVASTATIN 20 MG Oral Tab Take 1 tablet by mouth once daily 90 tablet 0    Ergocalciferol (VITAMIN D OR) Take 1,000 Units by mouth daily.         Were there any changes to your current medication(s) noted on the AVS? Yes  CHANGE how you take:  warfarin (Coumadin)  STOP taking:  aspirin 81 MG Tbec  If so, were these medication changes discussed with you prior to leaving the hospital? Yes  If a new medication was prescribed:    Was the new medication's purpose & side effects reviewed? Yes  Do you have any questions about your new medication? No  Did you  your discharge medications when you left the hospital? Yes  Let's go over your medications together to make sure we are not missing anything. Medications Reviewed  Are there any reasons that keep you from taking your medication as prescribed? No  Are you having any concerns with constipation? No  Did patient receive their flu shot (Sept-March)? No    Discharge medications reviewed/discussed/and reconciled against outpatient medications with patient.  Any changes or updates to medications sent to PCP.  Patient Acknowledged     Referrals/orders at D/C:  Referrals/orders placed at D/C? no  DME ordered at D/C? No    Discharge orders, AVS reviewed and discussed with patient. Any changes or updates to orders sent to PCP.  Patient Acknowledged      SDOH:   Transportation:    Transportation Needs: No Transportation Needs (5/22/2024)    Transportation Needs     Lack of Transportation: No     Car Seat: Not on file     Financial Strain:    Financial Resource Strain: Low Risk  (5/22/2024)    Financial Resource Strain     Difficulty of Paying Living Expenses: Not very hard     Med Affordability: No       Diagnosis specifics:   Warfarin/Coumadin:  Warfarin/Coumadin:   Next PT/INR Date: Prothrombin Time (PT)  Complete by: 05/22/24 (Approximate)   Current Warfarin Dose: 2.5 mg Tu/Th/Sat/Hernandez, 5 mg M/  W/F   Does patient know who to follow-up with Warfarin/Coumadin management?  yes     Who manages Warfarin/Coumadin? [facility]    Who is monitoring PT/INR?  [facility], [facility]  NCM Reviewed next PT/INR appointment with pt:  Yes  Interventions:  pt confirms appt today at 1:15 p.m. for INR check    Follow up appointments:    Follow-up Information    Follow up With Specialties Details Why Contact Info   Alden Joshua MD SURGERY, GENERAL Call in 6 week(s) Call for Follow-up after hospitalization [address]  [address]  [facility] [address]  [phone]   Alden Faulkner MD Internal Medicine Follow up in 1 week(s)  [address] [facility] [address]  [phone]   Gumaro Vegas, DO Cardiovascular Diseases Follow up in 1 week(s)  [address]  [address] [id number] [phone]         TCC  Was TCC ordered: No    PCP (If no TCC appointment)  Does patient already have a PCP appointment scheduled? No  NCM Scheduled PCP office TCM appointment with patient for today at 2:30 p.m. with PCP    Specialist    Does the patient have any other follow up appointment(s) needing to be scheduled? Yes  If yes: NCM reviewed upcoming specialist appointment with patient: Yes  Does the patient need assistance scheduling appointment(s): No--pt will schedule cardiology and gen sx f/u appts--declines appt assist    Is there any reason as to why you cannot make your appointment(s)?  No     Needs post D/C:   Now that you are home, are there any needs or concerns you need addressed before your next visit with your PCP?  (DME, meds,  questions, etc.): No    Interventions by NCM:   Discussed diet, activity, medications and need for f/u visits. Pt confirms [facility] appt today at 1:15 p.m. in [place] for INR check. Pt will also schedule cardiology and general sx f/u appts--declines appt assist. TCM appt made for today at 2:30 p.m. with Dr. Faulkner--prefers to see PCP prior to being out of office for holiday--declines appt with APRN.  Patient aware when to contact PCP/specialists and when to seek emergency care. No further questions/concerns at this time.    Overall Rating:   How would you rate the care you received while in the hospital? excellent    CCM referral placed:    No    BOOK BY DATE: 6/03/2024

## 2024-05-28 ENCOUNTER — LAB ENCOUNTER (OUTPATIENT)
Dept: LAB | Facility: HOSPITAL | Age: 74
End: 2024-05-28
Attending: INTERNAL MEDICINE
Payer: MEDICARE

## 2024-05-28 DIAGNOSIS — D62 ACUTE BLOOD LOSS ANEMIA: ICD-10-CM

## 2024-05-28 LAB
DEPRECATED RDW RBC AUTO: 58.2 FL (ref 35.1–46.3)
ERYTHROCYTE [DISTWIDTH] IN BLOOD BY AUTOMATED COUNT: 15.9 % (ref 11–15)
HCT VFR BLD AUTO: 35.1 %
HGB BLD-MCNC: 11.2 G/DL
MCH RBC QN AUTO: 32 PG (ref 26–34)
MCHC RBC AUTO-ENTMCNC: 31.9 G/DL (ref 31–37)
MCV RBC AUTO: 100.3 FL
PLATELET # BLD AUTO: 420 10(3)UL (ref 150–450)
RBC # BLD AUTO: 3.5 X10(6)UL
WBC # BLD AUTO: 6.8 X10(3) UL (ref 4–11)

## 2024-05-28 PROCEDURE — 85027 COMPLETE CBC AUTOMATED: CPT

## 2024-05-28 PROCEDURE — 36415 COLL VENOUS BLD VENIPUNCTURE: CPT

## (undated) DIAGNOSIS — R97.20 ABNORMAL PSA: Primary | ICD-10-CM

## (undated) NOTE — Clinical Note
Spoke with pt today--TCM appt made for 10/12/2023--declines sooner appt d/t work schedule. Pt does have f/u with Dr. Sulema Coburn 10/12/2023. Pt called this NCM back and confirmed appt and PT/INR draw today at 2:30 p.m. at [facility] in [facility]. TE sent for KEVIN f/u appt in one month--thank you.   Future Appointments 10/13/2023 2:30 PM    Jim Sorto MD         [facility]             JESSICA Aguilar

## (undated) NOTE — LETTER
[address], [place]  Authorization for Invasive Procedures  1.  I hereby authorize Dr. Trey Dejesus , my physician and whomever may be designated as the doctor's assistant, to perform the following operation and/or procedure:  Epigastric performed for the purposes of advancing medicine, science, and/or education, provided my identity is not revealed. If the procedure has been videotaped, the physician/surgeon will obtain the original videotape.  The hospital will not be responsible for stor My signature below affirms that prior to the time of the procedure, I have explained to the patient and/or his legal representative, the risks and benefits involved in the proposed treatment and any reasonable alternative to the proposed treatment.  I have

## (undated) NOTE — Clinical Note
Pt declined scheduling TCM apt at this time. RN outreach complete. Pt will be eligible for TCM if seen by 5/8/18. Pt transferred to cardiologist to schedule monitor placement.

## (undated) NOTE — LETTER
Reji Ruby  9/7/1950    A patient of your clinic was recently seen by the hospitalists at [facility], and will need to follow up with you on Friday 4/27/18. VM left for pt to call back .     The patient's last INR values were, as foll

## (undated) NOTE — LETTER
[id number]  [facility] [address], [place], [place]  [address]  INFORMED CONSENT FOR TRANSFUSION OF BLOOD OR BLOOD PRODUCTS  My physician has informed me of the nature, purpose, benefits and risks of transfusion for blood and blood components that ______________________________________________  (Signature of Patient)                                                            (Responsible party in case of Minor,

## (undated) NOTE — Clinical Note
Spoke with pt today for TCM--please see notes. Pt confirms [facility] appt today at 1:15 p.m. TCM appt made with you for today at 2:30 p.m.--thank you.  Future Appointments 5/22/2024  2:30 PM    Alden Faulkner MD [facility]

## (undated) NOTE — LETTER
[address], [place]  Authorization for Invasive Procedures  1.  I hereby authorize Dr. Nadja Frias , my physician and whomever may be designated as the doctor's assistant, to perform the following operation and/or procedure:  Mesenteric performed for the purposes of advancing medicine, science, and/or education, provided my identity is not revealed. If the procedure has been videotaped, the physician/surgeon will obtain the original videotape.  The hospital will not be responsible for stor My signature below affirms that prior to the time of the procedure, I have explained to the patient and/or his legal representative, the risks and benefits involved in the proposed treatment and any reasonable alternative to the proposed treatment.  I have